# Patient Record
Sex: FEMALE | Race: WHITE | NOT HISPANIC OR LATINO | Employment: OTHER | URBAN - METROPOLITAN AREA
[De-identification: names, ages, dates, MRNs, and addresses within clinical notes are randomized per-mention and may not be internally consistent; named-entity substitution may affect disease eponyms.]

---

## 2017-05-17 ENCOUNTER — TELEPHONE (OUTPATIENT)
Dept: FAMILY MEDICINE | Facility: CLINIC | Age: 82
End: 2017-05-17

## 2017-05-17 NOTE — TELEPHONE ENCOUNTER
Returned call informed patient that we have not received records yet from office. Informed her of fax number and advised her to call the doctors office to request that her records be sent here. Patient verbalized understanding.

## 2017-05-17 NOTE — TELEPHONE ENCOUNTER
----- Message from Alejandra Alfonso sent at 5/17/2017  9:46 AM CDT -----  Contact: Self  Pt called to verify if medical records were received from 's office. Pt can be reached @ [phone].

## 2017-06-02 ENCOUNTER — LAB VISIT (OUTPATIENT)
Dept: LAB | Facility: HOSPITAL | Age: 82
End: 2017-06-02
Attending: FAMILY MEDICINE
Payer: MEDICARE

## 2017-06-02 ENCOUNTER — OFFICE VISIT (OUTPATIENT)
Dept: FAMILY MEDICINE | Facility: CLINIC | Age: 82
End: 2017-06-02
Payer: MEDICARE

## 2017-06-02 VITALS
DIASTOLIC BLOOD PRESSURE: 58 MMHG | TEMPERATURE: 97 F | WEIGHT: 149.25 LBS | HEART RATE: 79 BPM | OXYGEN SATURATION: 98 % | HEIGHT: 64 IN | BODY MASS INDEX: 25.48 KG/M2 | SYSTOLIC BLOOD PRESSURE: 130 MMHG

## 2017-06-02 DIAGNOSIS — Z00.00 ANNUAL PHYSICAL EXAM: Primary | ICD-10-CM

## 2017-06-02 DIAGNOSIS — E11.51 TYPE 2 DIABETES MELLITUS WITH DIABETIC PERIPHERAL ANGIOPATHY WITHOUT GANGRENE, WITHOUT LONG-TERM CURRENT USE OF INSULIN: ICD-10-CM

## 2017-06-02 DIAGNOSIS — M26.623 BILATERAL TEMPOROMANDIBULAR JOINT PAIN: ICD-10-CM

## 2017-06-02 DIAGNOSIS — K59.04 CHRONIC IDIOPATHIC CONSTIPATION: ICD-10-CM

## 2017-06-02 DIAGNOSIS — I73.9 PAD (PERIPHERAL ARTERY DISEASE): ICD-10-CM

## 2017-06-02 DIAGNOSIS — J30.1 NON-SEASONAL ALLERGIC RHINITIS DUE TO POLLEN: ICD-10-CM

## 2017-06-02 DIAGNOSIS — I25.2 OLD MI (MYOCARDIAL INFARCTION): ICD-10-CM

## 2017-06-02 DIAGNOSIS — E78.00 PURE HYPERCHOLESTEROLEMIA: ICD-10-CM

## 2017-06-02 DIAGNOSIS — I20.89 STABLE ANGINA: ICD-10-CM

## 2017-06-02 PROBLEM — M26.629 TMJ ARTHRALGIA: Status: ACTIVE | Noted: 2017-06-02

## 2017-06-02 PROBLEM — E11.9 TYPE 2 DIABETES MELLITUS, WITHOUT LONG-TERM CURRENT USE OF INSULIN: Status: ACTIVE | Noted: 2017-06-02

## 2017-06-02 LAB
ALBUMIN SERPL BCP-MCNC: 4 G/DL
ALP SERPL-CCNC: 84 U/L
ALT SERPL W/O P-5'-P-CCNC: 18 U/L
ANION GAP SERPL CALC-SCNC: 11 MMOL/L
AST SERPL-CCNC: 19 U/L
BILIRUB SERPL-MCNC: 0.7 MG/DL
BUN SERPL-MCNC: 21 MG/DL
CALCIUM SERPL-MCNC: 10.6 MG/DL
CHLORIDE SERPL-SCNC: 108 MMOL/L
CHOLEST/HDLC SERPL: 2.4 {RATIO}
CO2 SERPL-SCNC: 22 MMOL/L
CREAT SERPL-MCNC: 1.1 MG/DL
EST. GFR  (AFRICAN AMERICAN): 52 ML/MIN/1.73 M^2
EST. GFR  (NON AFRICAN AMERICAN): 45 ML/MIN/1.73 M^2
GLUCOSE SERPL-MCNC: 190 MG/DL
HDL/CHOLESTEROL RATIO: 42.4 %
HDLC SERPL-MCNC: 39 MG/DL
HDLC SERPL-MCNC: 92 MG/DL
LDLC SERPL CALC-MCNC: 20.2 MG/DL
NONHDLC SERPL-MCNC: 53 MG/DL
POTASSIUM SERPL-SCNC: 4.8 MMOL/L
PROT SERPL-MCNC: 7.1 G/DL
SODIUM SERPL-SCNC: 141 MMOL/L
TRIGL SERPL-MCNC: 164 MG/DL

## 2017-06-02 PROCEDURE — 80053 COMPREHEN METABOLIC PANEL: CPT

## 2017-06-02 PROCEDURE — 80061 LIPID PANEL: CPT

## 2017-06-02 PROCEDURE — 99387 INIT PM E/M NEW PAT 65+ YRS: CPT | Mod: S$GLB,,, | Performed by: FAMILY MEDICINE

## 2017-06-02 PROCEDURE — 99999 PR PBB SHADOW E&M-EST. PATIENT-LVL IV: CPT | Mod: PBBFAC,,, | Performed by: FAMILY MEDICINE

## 2017-06-02 PROCEDURE — 36415 COLL VENOUS BLD VENIPUNCTURE: CPT

## 2017-06-02 PROCEDURE — 83036 HEMOGLOBIN GLYCOSYLATED A1C: CPT

## 2017-06-02 RX ORDER — METOPROLOL TARTRATE 25 MG/1
25 TABLET, FILM COATED ORAL DAILY
COMMUNITY
End: 2017-06-12 | Stop reason: SDUPTHER

## 2017-06-02 RX ORDER — ENALAPRIL MALEATE 20 MG/1
20 TABLET ORAL DAILY
COMMUNITY
End: 2017-06-12 | Stop reason: SDUPTHER

## 2017-06-02 RX ORDER — LOVASTATIN 20 MG/1
20 TABLET ORAL NIGHTLY
COMMUNITY
End: 2017-06-12 | Stop reason: ALTCHOICE

## 2017-06-02 RX ORDER — METFORMIN HYDROCHLORIDE 500 MG/1
500 TABLET ORAL 2 TIMES DAILY WITH MEALS
COMMUNITY
End: 2017-06-12 | Stop reason: SDUPTHER

## 2017-06-02 RX ORDER — AMOXICILLIN 250 MG
1 CAPSULE ORAL DAILY
COMMUNITY

## 2017-06-02 RX ORDER — ISOSORBIDE MONONITRATE 60 MG/1
60 TABLET, EXTENDED RELEASE ORAL DAILY
COMMUNITY
End: 2017-06-12 | Stop reason: SDUPTHER

## 2017-06-02 RX ORDER — CLOPIDOGREL BISULFATE 75 MG/1
75 TABLET ORAL DAILY
COMMUNITY
End: 2017-06-12 | Stop reason: SDUPTHER

## 2017-06-02 RX ORDER — FLUTICASONE PROPIONATE 50 MCG
2 SPRAY, SUSPENSION (ML) NASAL DAILY
Qty: 1 BOTTLE | Refills: 1 | Status: SHIPPED | OUTPATIENT
Start: 2017-06-02 | End: 2018-03-08

## 2017-06-02 RX ORDER — ASPIRIN 81 MG/1
81 TABLET ORAL DAILY
COMMUNITY

## 2017-06-02 RX ORDER — NITROGLYCERIN 0.3 MG/1
0.3 TABLET SUBLINGUAL EVERY 5 MIN PRN
COMMUNITY
End: 2017-06-12 | Stop reason: SDUPTHER

## 2017-06-02 RX ORDER — ATORVASTATIN CALCIUM 40 MG/1
40 TABLET, FILM COATED ORAL DAILY
COMMUNITY
End: 2017-06-12 | Stop reason: SDUPTHER

## 2017-06-02 NOTE — PROGRESS NOTES
Chief Complaint   Patient presents with    Establish Care     SUBJECTIVE:   88 y.o. female for annual routine  Checkup and to establish care because pcp is retiring.  We have updated her charts.  She has diabetes that is controlled, but lately into the 200's and going on for about 1 month.  Increase stressors at home, h/o MI 8/16, stents at [facility], dr. Byers cardiology.  Current Outpatient Prescriptions   Medication Sig Dispense Refill    ascorbic acid, vitamin C, (VITAMIN C) 100 MG tablet Take 500 mg by mouth once daily.      aspirin (ECOTRIN) 81 MG EC tablet Take 81 mg by mouth once daily.      atorvastatin (LIPITOR) 40 MG tablet Take 40 mg by mouth once daily.      clopidogrel (PLAVIX) 75 mg tablet Take 75 mg by mouth once daily.      enalapril (VASOTEC) 20 MG tablet Take 20 mg by mouth once daily.      isosorbide mononitrate (IMDUR) 60 MG 24 hr tablet Take 60 mg by mouth once daily.      lovastatin (MEVACOR) 20 MG tablet Take 20 mg by mouth every evening.      metformin (GLUCOPHAGE) 500 MG tablet Take 500 mg by mouth 2 (two) times daily with meals.      metoprolol tartrate (LOPRESSOR) 25 MG tablet Take 25 mg by mouth Daily.      nitroGLYCERIN (NITROSTAT) 0.3 MG SL tablet Place 0.3 mg under the tongue every 5 (five) minutes as needed for Chest pain.      senna-docusate 8.6-50 mg (PERICOLACE) 8.6-50 mg per tablet Take 1 tablet by mouth once daily.       No current facility-administered medications for this visit.      Allergies: Amoxicillin and Bactrim [sulfamethoxazole-trimethoprim]   No LMP recorded. Patient is postmenopausal.    ROS:  Feeling well. No dyspnea or chest pain on exertion.  No abdominal pain, change in bowel habits, black or bloody stools.  No urinary tract symptoms. GYN ROS: no breast pain or new or enlarging lumps on self exam, no vaginal bleeding. No neurological complaints.    OBJECTIVE:   The patient appears well, alert, oriented x 3, in no distress.  BP (!) 130/58   Pulse 79    "Temp 96.7 °F (35.9 °C) (Oral)   Ht 5' 4" (1.626 m)   Wt 67.7 kg (149 lb 4 oz)   SpO2 98%   BMI 25.62 kg/m²   ENT normal.  Neck supple. No adenopathy or thyromegaly. TERE. Lungs are clear, good air entry, no wheezes, rhonchi or rales. S1 and S2 normal, no murmurs, regular rate and rhythm. Abdomen soft without tenderness, guarding, mass or organomegaly. Extremities show no edema, normal peripheral pulses. Neurological is normal, no focal findings.    Independent with ADL's  BREAST EXAM: deferred    PELVIC EXAM: deferred    ASSESSMENT:   1. Old MI (myocardial infarction)    2. PAD (peripheral artery disease)    3. Pure hypercholesterolemia    4. Stable angina    5. Type 2 diabetes mellitus with diabetic peripheral angiopathy without gangrene, without long-term current use of insulin    6. Bilateral temporomandibular joint pain    7. Chronic idiopathic constipation          PLAN:   Tonya was seen today for establish care.    Diagnoses and all orders for this visit:    Old MI (myocardial infarction)    PAD (peripheral artery disease)    Pure hypercholesterolemia    Stable angina    Type 2 diabetes mellitus with diabetic peripheral angiopathy without gangrene, without long-term current use of insulin    Bilateral temporomandibular joint pain    Chronic idiopathic constipation          "

## 2017-06-03 LAB
ESTIMATED AVG GLUCOSE: 192 MG/DL
HBA1C MFR BLD HPLC: 8.3 %

## 2017-06-05 ENCOUNTER — TELEPHONE (OUTPATIENT)
Dept: FAMILY MEDICINE | Facility: CLINIC | Age: 82
End: 2017-06-05

## 2017-06-05 NOTE — PROGRESS NOTES
Tradjenta is fine, better than the 9.5 she had prior, we can get her in to discuss as well with you, she is so nice, would be good to meet you.

## 2017-06-05 NOTE — TELEPHONE ENCOUNTER
----- Message from Bunny Kuo MD sent at 6/5/2017  2:30 PM CDT -----  Tradjenta is fine, better than the 9.5 she had prior, we can get her in to discuss as well with you, she is so nice, would be good to meet you.

## 2017-06-12 ENCOUNTER — OFFICE VISIT (OUTPATIENT)
Dept: FAMILY MEDICINE | Facility: CLINIC | Age: 82
End: 2017-06-12
Payer: MEDICARE

## 2017-06-12 VITALS
TEMPERATURE: 98 F | BODY MASS INDEX: 25.6 KG/M2 | WEIGHT: 149.94 LBS | DIASTOLIC BLOOD PRESSURE: 70 MMHG | SYSTOLIC BLOOD PRESSURE: 126 MMHG | OXYGEN SATURATION: 97 % | HEIGHT: 64 IN | HEART RATE: 85 BPM

## 2017-06-12 DIAGNOSIS — I20.89 STABLE ANGINA: ICD-10-CM

## 2017-06-12 DIAGNOSIS — N18.30 CHRONIC KIDNEY DISEASE (CKD), STAGE III (MODERATE): ICD-10-CM

## 2017-06-12 DIAGNOSIS — E11.51 TYPE 2 DIABETES MELLITUS WITH DIABETIC PERIPHERAL ANGIOPATHY WITHOUT GANGRENE, WITHOUT LONG-TERM CURRENT USE OF INSULIN: Primary | ICD-10-CM

## 2017-06-12 PROCEDURE — 99999 PR PBB SHADOW E&M-EST. PATIENT-LVL III: CPT | Mod: PBBFAC,,, | Performed by: PHYSICIAN ASSISTANT

## 2017-06-12 PROCEDURE — 99214 OFFICE O/P EST MOD 30 MIN: CPT | Mod: S$GLB,,, | Performed by: PHYSICIAN ASSISTANT

## 2017-06-12 PROCEDURE — 1159F MED LIST DOCD IN RCRD: CPT | Mod: S$GLB,,, | Performed by: PHYSICIAN ASSISTANT

## 2017-06-12 RX ORDER — ENALAPRIL MALEATE 20 MG/1
20 TABLET ORAL DAILY
Qty: 90 TABLET | Refills: 3 | Status: SHIPPED | OUTPATIENT
Start: 2017-06-12 | End: 2018-07-06 | Stop reason: SDUPTHER

## 2017-06-12 RX ORDER — CLOPIDOGREL BISULFATE 75 MG/1
75 TABLET ORAL DAILY
Qty: 90 TABLET | Refills: 3 | Status: SHIPPED | OUTPATIENT
Start: 2017-06-12 | End: 2018-06-04 | Stop reason: SDUPTHER

## 2017-06-12 RX ORDER — ATORVASTATIN CALCIUM 40 MG/1
40 TABLET, FILM COATED ORAL DAILY
Qty: 90 TABLET | Refills: 3 | Status: SHIPPED | OUTPATIENT
Start: 2017-06-12 | End: 2018-07-06 | Stop reason: SDUPTHER

## 2017-06-12 RX ORDER — NITROGLYCERIN 0.3 MG/1
0.3 TABLET SUBLINGUAL EVERY 5 MIN PRN
Qty: 25 TABLET | Refills: 1 | Status: SHIPPED | OUTPATIENT
Start: 2017-06-12

## 2017-06-12 RX ORDER — METOPROLOL TARTRATE 25 MG/1
25 TABLET, FILM COATED ORAL DAILY
Qty: 90 TABLET | Refills: 3 | Status: SHIPPED | OUTPATIENT
Start: 2017-06-12 | End: 2018-09-06 | Stop reason: SDUPTHER

## 2017-06-12 RX ORDER — ISOSORBIDE MONONITRATE 60 MG/1
60 TABLET, EXTENDED RELEASE ORAL DAILY
Qty: 90 TABLET | Refills: 3 | Status: SHIPPED | OUTPATIENT
Start: 2017-06-12 | End: 2018-06-04 | Stop reason: SDUPTHER

## 2017-06-12 RX ORDER — METFORMIN HYDROCHLORIDE 500 MG/1
500 TABLET ORAL 2 TIMES DAILY WITH MEALS
Qty: 90 TABLET | Refills: 3 | Status: SHIPPED | OUTPATIENT
Start: 2017-06-12 | End: 2018-01-24 | Stop reason: SDUPTHER

## 2017-06-12 NOTE — PROGRESS NOTES
"Subjective:       Tonya Marquez is an 88 y.o. female who presents for follow up of diabetes. Current symptoms include: hyperglycemia. Patient denies hypoglycemia , paresthesia of the feet, polydipsia and polyuria. Evaluation to date has included: fasting blood sugar, fasting lipid panel, hemoglobin A1C and microalbuminuria. Home sugars: has not been checking due to low strips. Current treatments: Metformin 1000 am and 500 pm . Last dilated eye exam recent.    Review of Systems  Pertinent items are noted in HPI.      Objective:      /70   Pulse 85   Temp 97.7 °F (36.5 °C) (Oral)   Ht 5' 4" (1.626 m)   Wt 68 kg (149 lb 14.6 oz)   SpO2 97%   BMI 25.73 kg/m²     General Appearance:    Alert, cooperative, no distress, appears stated age   Head:    Normocephalic, without obvious abnormality, atraumatic   Eyes:    PERRL, conjunctiva/corneas clear           Throat:   Lips, mucosa, and tongue normal; teeth and gums normal           Lungs:     Clear to auscultation bilaterally, respirations unlabored       Heart:    Regular rate and rhythm, S1 and S2 normal, no murmur, rub   or gallop               Extremities:   Extremities normal, atraumatic, no cyanosis or edema   Pulses:   2+ and symmetric all extremities   Skin:   Skin color, texture, turgor normal, no rashes or lesions     Protective Sensation (w/ 10 gram monofilament):  Right: Intact  Left: Intact    Visual Inspection:  Normal -  Bilateral    Pedal Pulses:   Right: Present  Left: Present    Posterior tibialis:   Right:Diminshed  Left: Present                Laboratory:  Lab Results   Component Value Date    HGBA1C 8.3 (H) 06/02/2017     Lab Results   Component Value Date    LDLCALC 20.2 (L) 06/02/2017    CREATININE 1.1 06/02/2017       Assessment:     1. Type 2 diabetes mellitus with diabetic peripheral angiopathy without gangrene, without long-term current use of insulin  linagliptin (TRADJENTA) 5 mg Tab tablet    metoprolol tartrate (LOPRESSOR) 25 MG " tablet    metformin (GLUCOPHAGE) 500 MG tablet    enalapril (VASOTEC) 20 MG tablet    blood sugar diagnostic Strp   2. Chronic kidney disease (CKD), stage III (moderate)     3. Stable angina  atorvastatin (LIPITOR) 40 MG tablet    nitroGLYCERIN (NITROSTAT) 0.3 MG SL tablet    isosorbide mononitrate (IMDUR) 60 MG 24 hr tablet    clopidogrel (PLAVIX) 75 mg tablet             Plan:   Type 2 diabetes mellitus with diabetic peripheral angiopathy without gangrene, without long-term current use of insulin  -     linagliptin (TRADJENTA) 5 mg Tab tablet; Take 1 tablet (5 mg total) by mouth once daily.  Dispense: 90 tablet; Refill: 3   ADDING THIS BACK IN.   -     metoprolol tartrate (LOPRESSOR) 25 MG tablet; Take 1 tablet (25 mg total) by mouth Daily.  Dispense: 90 tablet; Refill: 3  -     metformin (GLUCOPHAGE) 500 MG tablet; Take 1 tablet (500 mg total) by mouth 2 (two) times daily with meals. 2 am and 1 pm  Dispense: 90 tablet; Refill: 3  -     enalapril (VASOTEC) 20 MG tablet; Take 1 tablet (20 mg total) by mouth once daily.  Dispense: 90 tablet; Refill: 3  -     blood sugar diagnostic Strp; Check BID  Dispense: 150 each; Refill: 3    Chronic kidney disease (CKD), stage III (moderate)  Repeat in 3 months.     Stable angina  -     atorvastatin (LIPITOR) 40 MG tablet; Take 1 tablet (40 mg total) by mouth once daily.  Dispense: 90 tablet; Refill: 3  -     nitroGLYCERIN (NITROSTAT) 0.3 MG SL tablet; Place 1 tablet (0.3 mg total) under the tongue every 5 (five) minutes as needed for Chest pain.  Dispense: 25 tablet; Refill: 1  -     isosorbide mononitrate (IMDUR) 60 MG 24 hr tablet; Take 1 tablet (60 mg total) by mouth once daily.  Dispense: 90 tablet; Refill: 3  -     clopidogrel (PLAVIX) 75 mg tablet; Take 1 tablet (75 mg total) by mouth once daily.  Dispense: 90 tablet; Refill: 3  Follow up with Dr. Byers.     Requesting eye exam and DEXA.

## 2017-06-19 ENCOUNTER — TELEPHONE (OUTPATIENT)
Dept: FAMILY MEDICINE | Facility: CLINIC | Age: 82
End: 2017-06-19

## 2017-06-19 NOTE — TELEPHONE ENCOUNTER
----- Message from Aminata Garcia sent at 6/19/2017 11:57 AM CDT -----  Patient states [facility] contacted her and they have not yet received her refill prescriptions. They need this done today. Patient can be reached at [phone]. Thank you!

## 2017-06-19 NOTE — TELEPHONE ENCOUNTER
[facility] contacted and what they need was verification. Pharmacy was given the correct dosage per Michael Garsia.

## 2017-06-20 ENCOUNTER — TELEPHONE (OUTPATIENT)
Dept: FAMILY MEDICINE | Facility: CLINIC | Age: 82
End: 2017-06-20

## 2017-06-20 NOTE — TELEPHONE ENCOUNTER
----- Message from Cordelia Lowry sent at 6/20/2017 12:38 PM CDT -----  Contact: Pia li/ [facility] [phone]  Requesting last lab results to be faxed over. Pls fax to [phone]   Attn : Pia. Thanks.....Karissa

## 2017-07-20 NOTE — TELEPHONE ENCOUNTER
Patient request refills on the Lovastatin 20mg. Dr mcneil medication is not in patient profile and she is taking (ATORVASTATIN 40MG) please advice. Thanks

## 2017-07-20 NOTE — TELEPHONE ENCOUNTER
----- Message from Cordelia Lowry sent at 7/20/2017  1:57 PM CDT -----  Contact: SELF   [phone]  Pt is requesting a refill on Lovastatin 200 mg. Pls call  [facility] [phone]. Thanks............Karissa

## 2017-07-20 NOTE — TELEPHONE ENCOUNTER
Pt instructed to stop lovastatin and continue atorvastatin only, pt currently taking both. Pt verbalize understanding and states she will put away the lovastatin.

## 2017-10-23 ENCOUNTER — OFFICE VISIT (OUTPATIENT)
Dept: FAMILY MEDICINE | Facility: CLINIC | Age: 82
End: 2017-10-23
Payer: MEDICARE

## 2017-10-23 VITALS
WEIGHT: 147.69 LBS | HEART RATE: 88 BPM | SYSTOLIC BLOOD PRESSURE: 136 MMHG | TEMPERATURE: 96 F | DIASTOLIC BLOOD PRESSURE: 62 MMHG | RESPIRATION RATE: 12 BRPM | BODY MASS INDEX: 25.21 KG/M2 | HEIGHT: 64 IN | OXYGEN SATURATION: 98 %

## 2017-10-23 DIAGNOSIS — E11.59 TYPE 2 DIABETES MELLITUS WITH OTHER CIRCULATORY COMPLICATION, WITHOUT LONG-TERM CURRENT USE OF INSULIN: Primary | ICD-10-CM

## 2017-10-23 DIAGNOSIS — I73.9 PAD (PERIPHERAL ARTERY DISEASE): ICD-10-CM

## 2017-10-23 DIAGNOSIS — I25.2 OLD MI (MYOCARDIAL INFARCTION): ICD-10-CM

## 2017-10-23 DIAGNOSIS — I83.813 VARICOSE VEINS OF BILATERAL LOWER EXTREMITIES WITH PAIN: ICD-10-CM

## 2017-10-23 DIAGNOSIS — M26.623 BILATERAL TEMPOROMANDIBULAR JOINT PAIN: ICD-10-CM

## 2017-10-23 DIAGNOSIS — K59.04 CHRONIC IDIOPATHIC CONSTIPATION: ICD-10-CM

## 2017-10-23 DIAGNOSIS — N18.30 CHRONIC KIDNEY DISEASE (CKD), STAGE III (MODERATE): ICD-10-CM

## 2017-10-23 PROCEDURE — 99214 OFFICE O/P EST MOD 30 MIN: CPT | Mod: S$GLB,,, | Performed by: FAMILY MEDICINE

## 2017-10-23 PROCEDURE — 99999 PR PBB SHADOW E&M-EST. PATIENT-LVL V: CPT | Mod: PBBFAC,,, | Performed by: FAMILY MEDICINE

## 2017-10-23 RX ORDER — DIAZEPAM 2 MG/1
2 TABLET ORAL NIGHTLY PRN
Qty: 30 TABLET | Refills: 0 | Status: SHIPPED | OUTPATIENT
Start: 2017-10-23 | End: 2017-12-04

## 2017-10-23 NOTE — PROGRESS NOTES
"Chief Complaint   Patient presents with    Diabetes       SUBJECTIVE:  Tonya Marquez is a 88 y.o. female here for follow up of diabetes.  She is well controlled, having trouble with her leg veins and her TMJ and would like to get surgical opinion before going back home to [place] next year,  She is ok with her medication and no side effects.  Currently has co-morbidities including per problem list.      Social History   Substance Use Topics    Smoking status: Never Smoker    Smokeless tobacco: Never Used    Alcohol use Not on file       Patient Active Problem List    Diagnosis Date Noted    Chronic kidney disease (CKD), stage III (moderate) 06/12/2017    Old MI (myocardial infarction) 06/02/2017 8/2016 stents, [facility]  Sleeps on both sides and 2 pillows      PAD (peripheral artery disease) 06/02/2017     Also noted, seeing cardiology      Pure hypercholesterolemia 06/02/2017    Stable angina 06/02/2017     Rarely uses NTG      Type 2 diabetes mellitus, without long-term current use of insulin 06/02/2017     Controlled in past  Now increasing.      TMJ arthralgia 06/02/2017    Chronic idiopathic constipation 06/02/2017       Review of Systems   Constitutional: Negative.    HENT: Negative.    Eyes: Negative.    Respiratory: Negative.    Cardiovascular: Positive for chest pain and claudication. Negative for palpitations, orthopnea, leg swelling and PND.   Gastrointestinal: Negative.    Genitourinary: Negative.    Musculoskeletal: Positive for joint pain.   Skin: Negative.    Neurological: Negative.    Endo/Heme/Allergies: Negative.    Psychiatric/Behavioral: Negative.        OBJECTIVE:  /62   Pulse 88   Temp 96.2 °F (35.7 °C) (Oral)   Resp 12   Ht 5' 4" (1.626 m)   Wt 67 kg (147 lb 11.3 oz)   SpO2 98%   BMI 25.35 kg/m²     Wt Readings from Last 3 Encounters:   10/23/17 67 kg (147 lb 11.3 oz)   06/12/17 68 kg (149 lb 14.6 oz)   06/02/17 67.7 kg (149 lb 4 oz)     BP Readings from Last 3 " Encounters:   10/23/17 136/62   06/12/17 126/70   06/02/17 (!) 130/58       She appears well, in no apparent distress.  Alert and oriented times three, pleasant and cooperative. Vital signs are as documented in vital signs section.  TMJ arthrosis noted bilaterally with pain  S1 and S2 normal, no murmurs, clicks, gallops or rubs. Regular rate and rhythm. Chest is clear; no wheezes or rales. No edema or JVD.  Bilateral legs with varicose veins    Review of old Records:  Reviewed per [facility]    Review of old labs:    No results found for: TSH  Lab Results   Component Value Date    WBC 6.6 09/24/2010    HGB 12.2 09/24/2010    HCT 35.0 (L) 09/24/2010    MCV 88.3 09/24/2010     09/24/2010       Chemistry        Component Value Date/Time     06/02/2017 1001    K 4.8 06/02/2017 1001     06/02/2017 1001    CO2 22 (L) 06/02/2017 1001    BUN 21 06/02/2017 1001    CREATININE 1.1 06/02/2017 1001     (H) 06/02/2017 1001        Component Value Date/Time    CALCIUM 10.6 (H) 06/02/2017 1001    ALKPHOS 84 06/02/2017 1001    AST 19 06/02/2017 1001    ALT 18 06/02/2017 1001    BILITOT 0.7 06/02/2017 1001    ESTGFRAFRICA 52 (A) 06/02/2017 1001    EGFRNONAA 45 (A) 06/02/2017 1001        Lab Results   Component Value Date    CHOL 92 (L) 06/02/2017     Lab Results   Component Value Date    HDL 39 (L) 06/02/2017     Lab Results   Component Value Date    LDLCALC 20.2 (L) 06/02/2017     Lab Results   Component Value Date    TRIG 164 (H) 06/02/2017     Lab Results   Component Value Date    CHOLHDL 42.4 06/02/2017         Review of old imaging:  n/a      ASSESSMENT:  Problem List Items Addressed This Visit     Type 2 diabetes mellitus, without long-term current use of insulin - Primary    Relevant Orders    Hemoglobin A1c    Basic metabolic panel    TMJ arthralgia    Relevant Medications    diazePAM (VALIUM) 2 MG tablet    Other Relevant Orders    Ambulatory referral to ENT    PAD (peripheral artery disease)    Relevant  Orders    Ambulatory referral to Vascular Surgery    Old MI (myocardial infarction)    Chronic kidney disease (CKD), stage III (moderate)    Chronic idiopathic constipation      Other Visit Diagnoses     Varicose veins of bilateral lower extremities with pain        Relevant Orders    Ambulatory referral to Vascular Surgery          ICD-10-CM ICD-9-CM   1. Type 2 diabetes mellitus with other circulatory complication, without long-term current use of insulin E11.59 250.70   2. Chronic idiopathic constipation K59.04 564.00   3. Chronic kidney disease (CKD), stage III (moderate) N18.3 585.3   4. Old MI (myocardial infarction) I25.2 412   5. PAD (peripheral artery disease) I73.9 443.9   6. Bilateral temporomandibular joint pain M26.623 524.62   7. Varicose veins of bilateral lower extremities with pain I83.813 454.8               PLAN:     get the new labs    Continue constipation therapy    tradjenta for CKD and check new bmp    Seeing [place] for the angina this week.    PAD and varicose veins will get her to vascular.    ENT with the TMJ    Medication List with Changes/Refills   New Medications    DIAZEPAM (VALIUM) 2 MG TABLET    Take 1 tablet (2 mg total) by mouth nightly as needed for Anxiety.   Current Medications    ASCORBIC ACID, VITAMIN C, (VITAMIN C) 100 MG TABLET    Take 500 mg by mouth once daily.    ASPIRIN (ECOTRIN) 81 MG EC TABLET    Take 81 mg by mouth once daily.    ATORVASTATIN (LIPITOR) 40 MG TABLET    Take 1 tablet (40 mg total) by mouth once daily.    BLOOD SUGAR DIAGNOSTIC STRP    Check BID    CLOPIDOGREL (PLAVIX) 75 MG TABLET    Take 1 tablet (75 mg total) by mouth once daily.    ENALAPRIL (VASOTEC) 20 MG TABLET    Take 1 tablet (20 mg total) by mouth once daily.    FLUTICASONE (FLONASE) 50 MCG/ACTUATION NASAL SPRAY    2 sprays by Each Nare route once daily.    ISOSORBIDE MONONITRATE (IMDUR) 60 MG 24 HR TABLET    Take 1 tablet (60 mg total) by mouth once daily.    LINAGLIPTIN (TRADJENTA) 5 MG TAB  TABLET    Take 1 tablet (5 mg total) by mouth once daily.    METFORMIN (GLUCOPHAGE) 500 MG TABLET    Take 1 tablet (500 mg total) by mouth 2 (two) times daily with meals. 2 am and 1 pm    METOPROLOL TARTRATE (LOPRESSOR) 25 MG TABLET    Take 1 tablet (25 mg total) by mouth Daily.    NITROGLYCERIN (NITROSTAT) 0.3 MG SL TABLET    Place 1 tablet (0.3 mg total) under the tongue every 5 (five) minutes as needed for Chest pain.    SENNA-DOCUSATE 8.6-50 MG (PERICOLACE) 8.6-50 MG PER TABLET    Take 1 tablet by mouth once daily.       Return in about 3 months (around 1/23/2018) for assess treatment plan, diabetes management.

## 2017-10-24 ENCOUNTER — LAB VISIT (OUTPATIENT)
Dept: LAB | Facility: HOSPITAL | Age: 82
End: 2017-10-24
Payer: MEDICARE

## 2017-10-24 DIAGNOSIS — E11.59 TYPE 2 DIABETES MELLITUS WITH OTHER CIRCULATORY COMPLICATION, WITHOUT LONG-TERM CURRENT USE OF INSULIN: ICD-10-CM

## 2017-10-24 LAB
ANION GAP SERPL CALC-SCNC: 7 MMOL/L
BUN SERPL-MCNC: 19 MG/DL
CALCIUM SERPL-MCNC: 10.5 MG/DL
CHLORIDE SERPL-SCNC: 108 MMOL/L
CO2 SERPL-SCNC: 24 MMOL/L
CREAT SERPL-MCNC: 1 MG/DL
EST. GFR  (AFRICAN AMERICAN): 58.1 ML/MIN/1.73 M^2
EST. GFR  (NON AFRICAN AMERICAN): 50.4 ML/MIN/1.73 M^2
ESTIMATED AVG GLUCOSE: 148 MG/DL
GLUCOSE SERPL-MCNC: 171 MG/DL
HBA1C MFR BLD HPLC: 6.8 %
POTASSIUM SERPL-SCNC: 4.6 MMOL/L
SODIUM SERPL-SCNC: 139 MMOL/L

## 2017-10-24 PROCEDURE — 80048 BASIC METABOLIC PNL TOTAL CA: CPT

## 2017-10-24 PROCEDURE — 83036 HEMOGLOBIN GLYCOSYLATED A1C: CPT

## 2017-10-24 PROCEDURE — 36415 COLL VENOUS BLD VENIPUNCTURE: CPT | Mod: PO

## 2017-10-30 DIAGNOSIS — I87.2 VENOUS INSUFFICIENCY OF BOTH LOWER EXTREMITIES: Primary | ICD-10-CM

## 2017-10-31 ENCOUNTER — TELEPHONE (OUTPATIENT)
Dept: VASCULAR SURGERY | Facility: CLINIC | Age: 82
End: 2017-10-31

## 2017-10-31 ENCOUNTER — TELEPHONE (OUTPATIENT)
Dept: FAMILY MEDICINE | Facility: CLINIC | Age: 82
End: 2017-10-31

## 2017-10-31 NOTE — TELEPHONE ENCOUNTER
Patient stated that she had studies from [facility] but she did not want to return to their clinic. Advised patient to either sign a release of information for our office to receive the studies or she could request to have the studies herself and she could bring them in for her appointment. Explained once she knew that her ultrasounds would be available to call and would make her appointment with the doctor. Patient stated understanding. Gave patient the appointment desk number.

## 2017-11-22 ENCOUNTER — TELEPHONE (OUTPATIENT)
Dept: VASCULAR SURGERY | Facility: CLINIC | Age: 82
End: 2017-11-22

## 2017-11-22 NOTE — TELEPHONE ENCOUNTER
Attempted to contact patient about appointment and ultrasound. No answer left voice mail. Explained that have never received the ultrasounds from [facility].Stated patient will need to do ultrasound here unless she wants to get the records herself from [facility]. Will attempt to call again on Friday.

## 2017-11-22 NOTE — TELEPHONE ENCOUNTER
Patient returned phone call. Explained that changed appt. For Dec 4th and a ultrasound for the 30th. She stated that she was going to go down to [facility] today and talk with them again as she did not want to have to do a ultrasound again. Explained to just request her own records and then she could just bring them to the appt. When she comes. She stated she would call once she knew what [facility] would tell her. Explained to just call and can always cancel or reschedule appointment if needed.

## 2017-12-01 ENCOUNTER — TELEPHONE (OUTPATIENT)
Dept: VASCULAR SURGERY | Facility: CLINIC | Age: 82
End: 2017-12-01

## 2017-12-01 NOTE — TELEPHONE ENCOUNTER
Left voicemail about patients appointment tomorrow. Left a call back number for any questions or if needed to change this appointment.

## 2017-12-04 ENCOUNTER — OFFICE VISIT (OUTPATIENT)
Dept: VASCULAR SURGERY | Facility: CLINIC | Age: 82
End: 2017-12-04
Payer: MEDICARE

## 2017-12-04 VITALS
WEIGHT: 147.5 LBS | HEIGHT: 62 IN | RESPIRATION RATE: 15 BRPM | HEART RATE: 52 BPM | SYSTOLIC BLOOD PRESSURE: 130 MMHG | BODY MASS INDEX: 27.14 KG/M2 | DIASTOLIC BLOOD PRESSURE: 54 MMHG

## 2017-12-04 DIAGNOSIS — I83.93 SPIDER VEINS OF BOTH LOWER EXTREMITIES: ICD-10-CM

## 2017-12-04 DIAGNOSIS — I83.93 VARICOSE VEINS OF BOTH LOWER EXTREMITIES: Primary | ICD-10-CM

## 2017-12-04 DIAGNOSIS — R60.0 BILATERAL LEG EDEMA: ICD-10-CM

## 2017-12-04 PROCEDURE — 99204 OFFICE O/P NEW MOD 45 MIN: CPT | Mod: S$GLB,,, | Performed by: SURGERY

## 2017-12-04 PROCEDURE — 99999 PR PBB SHADOW E&M-EST. PATIENT-LVL III: CPT | Mod: PBBFAC,,, | Performed by: SURGERY

## 2017-12-04 NOTE — LETTER
December 4, 2017      Bunyn Kuo MD  [address] [place] [address]           [facility]  [address] [facility] [address]  [place] [address]  Phone: [phone]  Fax: [phone]          Patient: Tonya Marquez   MR Number: [id number]   YOB: 1929   Date of Visit: 12/4/2017       Dear Dr. Bunny Kuo:    Thank you for referring Tonya Marquez to me for evaluation.  I have initiated a comprehensive treatment plan for her BLE varicose veins and pain.  Please continue to evaluate for other etiologies of her calf pain including electrolyte abnormalities vs restless leg syndrome etc.  Please let me know if there is anything else that I can do to help you.    Attached you will find relevant portions of my assessment and plan of care.    If you have questions, please do not hesitate to call me. I look forward to following Tonya Marquez along with you.    Sincerely,    Robert Love MD    Enclosure  CC:  No Recipients    If you would like to receive this communication electronically, please contact [email or web address] or [phone] to request more information on [employer] Link access.    For providers and/or their staff who would like to refer a patient to [facility], please contact us through our one-stop-shop provider referral line, [facility], at [phone].    If you feel you have received this communication in error or would no longer like to receive these types of communications, please e-mail [email or web address]

## 2017-12-04 NOTE — PATIENT INSTRUCTIONS
Putting on Compression Stockings     Turn the stocking inside-out, then fit it over your toes and heel.          Roll the stocking up your leg.            Once stockings are on, make sure the top of the stocking is about two fingers width below the crease of the knee (or the groin if you wear thigh-high stockings).          Use equipment, such as a stocking margo, or wear rubber gloves to make it easier to put on compression stockings.         Elastic compression stockings are prescribed to treat many vein problems. Wearing them may be the most important thing you do to manage your symptoms. The stockings fit tightly around your ankle, gradually reducing in pressure as they go up your legs. This helps keep blood flowing to your heart. As a result, swelling is reduced. Your healthcare provider will prescribe stockings at a safe pressure for you. He or she will also tell you how often to wear and remove the stockings. Follow these instructions closely. Also, do not buy or wear compression stockings without first seeing your healthcare provider.  Tips for wear and care  To wear stockings safely and to get the most benefit:  · Wear the length prescribed by your healthcare provider.  · Pull them to the designated height and no farther. Dont let them bunch at the top. This can restrict blood flow and increase swelling.  · Wear the stockings for the amount of time your healthcare provider recommends. Replace them when they start to feel loose. This will likely be every 3 to 6 months.  · Remove them as your healthcare provider directs. When removed, wash your legs. Then check your legs and feet for sores. Call your healthcare provider if you find a sore. Dont put the stockings back on unless your healthcare provider directs.   · Wash the stockings as instructed. They may need to be hand-washed.  Date Last Reviewed: 5/1/2016  © 8580-1981 [employer]. [address] [place] [address], [place] [address]. All rights  reserved. This information is not intended as a substitute for professional medical care. Always follow your healthcare professional's instructions.        Understanding Chronic Venous Insufficiency  Problems with the veins in the legs may lead to chronic venous insufficiency (CVI). CVI means that there is a long-term problem with the veins not being able to pump blood back to your heart. When this happens, blood stays in the legs and causes swelling and aching.   Two problems that may lead to chronic venous insufficiency are:  · Damaged valves. Valves keep blood flowing from the legs through the blood vessels and back to the heart. When the valves are damaged, blood does not flow as well.   · Deep vein thrombosis (DVT). Blood clots may form in the deep veins of the legs. This may cause pain, redness, and swelling in the legs. It may also block the flow of blood back to the heart. Seek immediate medical care if you have these symptoms.  · A blood clot in the leg can also break off and travel to the lungs. This is called pulmonary embolism (PE). In the lungs, the clot can cut off the flow of blood. This may cause chest pain, trouble breathing, sweating, a fast heartbeat, coughing (may cough up blood), and fainting. It is a medical emergency and may cause death. Call 911 if you have these symptoms.  · Healthcare providers call the two conditions, DVT and PE, venous thromboembolism (VTE).  CVI cant be cured, but you can control leg swelling to reduce the likelihood of ulcers (sores).  Recognizing the symptoms  Be aware of the following:  · If you stand or sit with your feet down for long periods, your legs may ache or feel heavy.  · Swollen ankles are possibly the most common symptom of CVI.  · As swelling increases, the skin over your ankles may show red spots or a brownish tinge. The skin may feel leathery or scaly, and may start to itch.  · If swelling is not controlled, an ulcer (open wound) may form.  What you  can do  Reduce your risk of developing ulcers by doing the following:  · Increase blood flow back to your heart by elevating your legs, exercising daily, and wearing elastic stockings.  · Boost blood flow in your legs by losing excess weight.  · If you must stand or sit in one place for a period of time, keep your blood moving by wiggling your toes, shifting your body position, and rising up on the balls of your feet.    Date Last Reviewed: 5/1/2016  © 6478-7145 [employer]. [address] [place] [address]. All rights reserved. This information is not intended as a substitute for professional medical care. Always follow your healthcare professional's instructions.        Understanding Veins   The ongoing flow of blood from the heart to the body and back to the heart again is called circulation. Blood vessels carry blood throughout your body. Veins are the vessels that return blood to the heart.      Anatomy of a vein  Veins have cuplike flaps called valves spaced along their inside walls. Valves open upward, so blood can move up the vein. Valves close to keep blood from falling back down the vein.     A bart muscle squeezes blood through the valve up the vein.        A relaxing muscle closes the valve, holding the blood in place.     Date Last Reviewed: 5/1/2016  © 1854-4881 [employer]. [address] [place] [address]. All rights reserved. This information is not intended as a substitute for professional medical care. Always follow your healthcare professional's instructions.

## 2017-12-04 NOTE — PROGRESS NOTES
Robert Love MD [facility]                         12/04/2017    HPI:  Tonya Marquez is a 88 y.o. female with   Patient Active Problem List   Diagnosis    Old MI (myocardial infarction)    PAD (peripheral artery disease)    Pure hypercholesterolemia    Stable angina    Type 2 diabetes mellitus, without long-term current use of insulin    TMJ arthralgia    Chronic idiopathic constipation    Chronic kidney disease (CKD), stage III (moderate)   s/p MI 2016 requiring coronary stenting at [facility] being managed by PCP and specialists who is here today for evaluation of BLE varicose veins and edema.  She is s/p L GSV stripping 20-30 yrs ago and L SSV sclerotherapy 2015.  Cont to have c/o BLE pain.  Describes BLE pain nightly.  Patient states location is bilateral calf occurring for 2 years.  Associated signs and symptoms are none.  Quality is cramping and severity is 5/10.  Symptoms began 2 yrs ago after L SSV sclerotherapy.  Alleviating factors include exercise and pain medication.  Worsening factors include dependency.  Denies BLE edema, inflammatory symptoms and wounds.    yes MI  no Stroke  Tobacco use: never smoker    Past Medical History:   Diagnosis Date    Coronary artery disease     Diabetes mellitus type I     Hyperlipidemia     Hypertension      Past Surgical History:   Procedure Laterality Date    ADENOIDECTOMY      CATARACT EXTRACTION, BILATERAL      CORONARY STENT PLACEMENT  08/21/2016    dr. ruiz     History reviewed. No pertinent family history.  Social History     Social History    Marital status:      Spouse name: N/A    Number of children: N/A    Years of education: N/A     Occupational History    Not on file.     Social History Main Topics    Smoking status: Never Smoker    Smokeless tobacco: Never Used    Alcohol use Not on file    Drug use: Unknown    Sexual activity: Not on file     Other Topics Concern     Not on file     Social History Narrative    No narrative on file       Current Outpatient Prescriptions:     aspirin (ECOTRIN) 81 MG EC tablet, Take 81 mg by mouth once daily., Disp: , Rfl:     atorvastatin (LIPITOR) 40 MG tablet, Take 1 tablet (40 mg total) by mouth once daily., Disp: 90 tablet, Rfl: 3    blood sugar diagnostic Strp, Check BID, Disp: 150 each, Rfl: 3    clopidogrel (PLAVIX) 75 mg tablet, Take 1 tablet (75 mg total) by mouth once daily., Disp: 90 tablet, Rfl: 3    enalapril (VASOTEC) 20 MG tablet, Take 1 tablet (20 mg total) by mouth once daily., Disp: 90 tablet, Rfl: 3    fluticasone (FLONASE) 50 mcg/actuation nasal spray, 2 sprays by Each Nare route once daily., Disp: 1 Bottle, Rfl: 1    isosorbide mononitrate (IMDUR) 60 MG 24 hr tablet, Take 1 tablet (60 mg total) by mouth once daily., Disp: 90 tablet, Rfl: 3    linagliptin (TRADJENTA) 5 mg Tab tablet, Take 1 tablet (5 mg total) by mouth once daily., Disp: 90 tablet, Rfl: 3    metformin (GLUCOPHAGE) 500 MG tablet, Take 1 tablet (500 mg total) by mouth 2 (two) times daily with meals. 2 am and 1 pm, Disp: 90 tablet, Rfl: 3    metoprolol tartrate (LOPRESSOR) 25 MG tablet, Take 1 tablet (25 mg total) by mouth Daily., Disp: 90 tablet, Rfl: 3    nitroGLYCERIN (NITROSTAT) 0.3 MG SL tablet, Place 1 tablet (0.3 mg total) under the tongue every 5 (five) minutes as needed for Chest pain., Disp: 25 tablet, Rfl: 1    ascorbic acid, vitamin C, (VITAMIN C) 100 MG tablet, Take 500 mg by mouth once daily., Disp: , Rfl:     senna-docusate 8.6-50 mg (PERICOLACE) 8.6-50 mg per tablet, Take 1 tablet by mouth once daily., Disp: , Rfl:     REVIEW OF SYSTEMS:  General: No fevers or chills; ENT: No sore throat; Allergy and Immunology: no persistent infections; Hematological and Lymphatic: No history of bleeding or easy bruising; Endocrine: negative; Respiratory: no cough, shortness of breath, or wheezing; Cardiovascular: no chest pain or dyspnea on  exertion; Gastrointestinal: no abdominal pain/back, change in bowel habits, or bloody stools; Genito-Urinary: no dysuria, trouble voiding, or hematuria; Musculoskeletal: negative; Neurological: no TIA or stroke symptoms; Psychiatric: no nervousness, anxiety or depression.    PHYSICAL EXAM:   Right Arm BP - Sittin/60 (17 0920)  Left Arm BP - Sittin/54 (17 0920)  Pulse: (!) 52         General appearance:  Alert, well-appearing, and in no distress.  Oriented to person, place, and time                    Neurological: Normal speech, no focal findings noted; CN II - XII grossly intact. RLE with sensation to light touch, LLE with sensation to light touch.            Musculoskeletal: Digits/nail without cyanosis/clubbing.  Strength 5/5 BLE.                    Neck: Supple, no significant adenopathy, no carotid bruit can be auscultated                  Chest:  Clear to auscultation, no wheezes, rales or rhonchi, symmetric air entry. No use of accessory muscles               Cardiac: Normal rate and regular rhythm, S1 and S2 normal            Abdomen: Soft, nontender, nondistended, no masses or organomegaly, no hernia     No rebound tenderness noted; bowel sounds normal     No groin adenopathy      Extremities:   2+ R femoral pulse, 2+ L femoral pulse     1+ R popliteal pulse, 1+ L popliteal pulse     1+ R PT pulse, 1+ L PT pulse     2+ R DP pulse, 2+ L DP pulse     1+ RLE edema, 1+ LLE edema    Skin: RLE without ulcer; LLE without ulcer     Bilateral LE varicose veins, no thrombophlebitis, bilateral LE reticular veins, minimal edema BLE     CEAP RLE 3, 3    LAB RESULTS:  No results found for: CBC  Lab Results   Component Value Date    LABPROT 10.3 2010    INR 1.0 2010     Lab Results   Component Value Date     10/24/2017    K 4.6 10/24/2017     10/24/2017    CO2 24 10/24/2017     (H) 10/24/2017    BUN 19 10/24/2017    CREATININE 1.0 10/24/2017    CALCIUM 10.5 10/24/2017     ANIONGAP 7 (L) 10/24/2017    EGFRNONAA 50.4 (A) 10/24/2017     Lab Results   Component Value Date    WBC 6.6 09/24/2010    RBC 3.96 (L) 09/24/2010    HGB 12.2 09/24/2010    HCT 35.0 (L) 09/24/2010    MCV 88.3 09/24/2010    MCH 30.8 09/24/2010    MCHC 34.9 09/24/2010    RDW 13.6 09/24/2010     09/24/2010    MPV 9.1 (L) 09/24/2010    GRAN 4.5 09/24/2010    GRAN 67.9 09/24/2010    LYMPH 23.9 09/24/2010    LYMPH 1.6 09/24/2010    MONO 6.7 09/24/2010    MONO 0.4 09/24/2010    EOS 0.1 09/24/2010    BASO 0.0 09/24/2010    EOSINOPHIL 1.2 09/24/2010    BASOPHIL 0.3 09/24/2010     .  Lab Results   Component Value Date    HGBA1C 6.8 (H) 10/24/2017       IMAGING:  All pertinent imaging has been reviewed and interpreted independently.    No outside imaging available.    CORA 1/1    IMP/PLAN:  88 y.o. female with   Patient Active Problem List   Diagnosis    Old MI (myocardial infarction)    PAD (peripheral artery disease)    Pure hypercholesterolemia    Stable angina    Type 2 diabetes mellitus, without long-term current use of insulin    TMJ arthralgia    Chronic idiopathic constipation    Chronic kidney disease (CKD), stage III (moderate)   s/p MI 2016 requiring coronary stenting at [facility] being managed by PCP and specialists who is here today for evaluation of BLE varicose veins and edema.    -BLE varicose veins associated with pain - recommend compression with Rx stockings (provided today), elevation, dietary changes associated with water and sodium intake discussed at length with patient  -Calf pain nightly is likely multifactorial - rec continued evaluation of etiologies by Dr. Kuo with electrolyte monitoring vs restless leg syndrome etc  -Will see pt back in 3 months for further evaluation of symptoms after conservative treatment and review of outside imaging (will obtain release from patient)      I spent 45 minutes evaluating this patient and greater than 50% of the time was spent counseling,  coordinator care and discussing the plan of care.  All questions were answered and patient stated understanding with agreement with the above treatment plan.    Robert Love MD R[facility]I  [facility]ascular [facility] Endovascular [facility]

## 2017-12-12 ENCOUNTER — OFFICE VISIT (OUTPATIENT)
Dept: FAMILY MEDICINE | Facility: CLINIC | Age: 82
End: 2017-12-12
Payer: MEDICARE

## 2017-12-12 VITALS
RESPIRATION RATE: 16 BRPM | HEART RATE: 82 BPM | HEIGHT: 62 IN | SYSTOLIC BLOOD PRESSURE: 120 MMHG | TEMPERATURE: 98 F | WEIGHT: 145.75 LBS | OXYGEN SATURATION: 98 % | DIASTOLIC BLOOD PRESSURE: 60 MMHG | BODY MASS INDEX: 26.82 KG/M2

## 2017-12-12 DIAGNOSIS — N18.30 TYPE 2 DIABETES MELLITUS WITH STAGE 3 CHRONIC KIDNEY DISEASE, WITHOUT LONG-TERM CURRENT USE OF INSULIN: ICD-10-CM

## 2017-12-12 DIAGNOSIS — E11.319 TYPE 2 DIABETES MELLITUS WITH RETINOPATHY, WITHOUT LONG-TERM CURRENT USE OF INSULIN, MACULAR EDEMA PRESENCE UNSPECIFIED, UNSPECIFIED LATERALITY, UNSPECIFIED RETINOPATHY SEVERITY: ICD-10-CM

## 2017-12-12 DIAGNOSIS — K59.04 CHRONIC IDIOPATHIC CONSTIPATION: ICD-10-CM

## 2017-12-12 DIAGNOSIS — I87.2 VENOUS INSUFFICIENCY OF BOTH LOWER EXTREMITIES: ICD-10-CM

## 2017-12-12 DIAGNOSIS — I25.2 OLD MI (MYOCARDIAL INFARCTION): ICD-10-CM

## 2017-12-12 DIAGNOSIS — E11.42 TYPE 2 DIABETES MELLITUS WITH POLYNEUROPATHY: ICD-10-CM

## 2017-12-12 DIAGNOSIS — I25.118 CORONARY ARTERY DISEASE OF NATIVE ARTERY OF NATIVE HEART WITH STABLE ANGINA PECTORIS: ICD-10-CM

## 2017-12-12 DIAGNOSIS — Z23 NEED FOR INFLUENZA VACCINATION: ICD-10-CM

## 2017-12-12 DIAGNOSIS — E11.22 TYPE 2 DIABETES MELLITUS WITH STAGE 3 CHRONIC KIDNEY DISEASE, WITHOUT LONG-TERM CURRENT USE OF INSULIN: ICD-10-CM

## 2017-12-12 DIAGNOSIS — Z00.00 ENCOUNTER FOR PREVENTIVE HEALTH EXAMINATION: Primary | ICD-10-CM

## 2017-12-12 DIAGNOSIS — M26.623 BILATERAL TEMPOROMANDIBULAR JOINT PAIN: ICD-10-CM

## 2017-12-12 DIAGNOSIS — E78.00 PURE HYPERCHOLESTEROLEMIA: ICD-10-CM

## 2017-12-12 DIAGNOSIS — Z78.0 POSTMENOPAUSAL: ICD-10-CM

## 2017-12-12 DIAGNOSIS — Z23 NEED FOR PNEUMOCOCCAL VACCINATION: ICD-10-CM

## 2017-12-12 PROCEDURE — 90670 PCV13 VACCINE IM: CPT | Mod: S$GLB,,, | Performed by: FAMILY MEDICINE

## 2017-12-12 PROCEDURE — G0008 ADMIN INFLUENZA VIRUS VAC: HCPCS | Mod: S$GLB,,, | Performed by: FAMILY MEDICINE

## 2017-12-12 PROCEDURE — 99999 PR PBB SHADOW E&M-EST. PATIENT-LVL V: CPT | Mod: PBBFAC,,, | Performed by: NURSE PRACTITIONER

## 2017-12-12 PROCEDURE — G0009 ADMIN PNEUMOCOCCAL VACCINE: HCPCS | Mod: S$GLB,,, | Performed by: FAMILY MEDICINE

## 2017-12-12 PROCEDURE — 90662 IIV NO PRSV INCREASED AG IM: CPT | Mod: S$GLB,,, | Performed by: FAMILY MEDICINE

## 2017-12-12 PROCEDURE — G0439 PPPS, SUBSEQ VISIT: HCPCS | Mod: S$GLB,,, | Performed by: NURSE PRACTITIONER

## 2017-12-12 NOTE — PROGRESS NOTES
"Tonya Marquez presented for a  Medicare AWV and comprehensive Health Risk Assessment today. The following components were reviewed and updated:    · Medical history  · Family History  · Social history  · Allergies and Current Medications  · Health Risk Assessment  · Health Maintenance  · Care Team     ** See Completed Assessments for Annual Wellness Visit within the encounter summary.**       The following assessments were completed:  · Living Situation  · CAGE  · Depression Screening  · Timed Get Up and Go  · Whisper Test  · Cognitive Function Screening  · Nutrition Screening  · ADL Screening  · PAQ Screening    Vitals:    12/12/17 1351   BP: 120/60   Pulse: 82   Resp: 16   Temp: 98.3 °F (36.8 °C)   TempSrc: Oral   SpO2: 98%   Weight: 66.1 kg (145 lb 11.6 oz)   Height: 5' 2" (1.575 m)     Body mass index is 26.65 kg/m².  Physical Exam   Constitutional: She is oriented to person, place, and time. She appears well-developed. No distress.   HENT:   Head: Normocephalic.   Right Ear: External ear normal.   Left Ear: External ear normal.   Nose: Nose normal.   Mouth/Throat: Oropharynx is clear and moist.   Cardiovascular: Normal rate and regular rhythm.    Murmur heard.  Pulses:       Dorsalis pedis pulses are 2+ on the right side, and 2+ on the left side.        Posterior tibial pulses are 0 on the right side, and 0 on the left side.   Pulmonary/Chest: Effort normal and breath sounds normal.   Feet:   Right Foot:   Protective Sensation: 10 sites tested. 2 sites sensed.   Skin Integrity: Negative for skin breakdown.   Left Foot:   Protective Sensation: 10 sites tested. 0 sites sensed.   Skin Integrity: Negative for skin breakdown.   Neurological: She is alert and oriented to person, place, and time.   Skin: She is not diaphoretic.   Psychiatric: She has a normal mood and affect. Her behavior is normal.             Diagnoses and health risks identified today and associated recommendations/orders:    1. Coronary artery " disease of native artery of native heart with stable angina pectoris  She is on imdur, s/p MI 2016 requiring stenting at [facility], follow by Dr. Byers  - Lipid panel; Future  - Ambulatory referral to Nutrition Services    2. Type 2 diabetes mellitus with stage 3 chronic kidney disease, without long-term current use of insulin  Controlled on metformin and tradjenta  - Ambulatory referral to Nutrition Services  - Comprehensive metabolic panel; Future  - Hemoglobin A1c; Future  - CBC auto differential; Future    3. Type 2 diabetes mellitus with retinopathy, without long-term current use of insulin, macular edema presence unspecified, unspecified laterality, unspecified retinopathy severity  Follow by Dr. Tomlinson and has had bilateral sx repair retinal detachment    4. Type 2 diabetes mellitus with polyneuropathy  Abnormal monofilament test today, pt unable to sense monofilament, she has no skin breakdown, she is follow by podiatry, Dr. Gallegos and has seen him this year    5. Bilateral temporomandibular joint pain  Has seen several ENT without improvement and desires oral maxillary sx evaluation  - Ambulatory consult to Oral Maxillofacial Surgery    6. Old MI (myocardial infarction)  Noted, s/p stent 2016    7. Pure hypercholesterolemia  On lipitor 40 mg with some cognitive decline and last LDL 20, advise to repeat test and consider reducing dosage if persistently low  - Lipid panel; Future    8. Venous insufficiency of both lower extremities  Follow by vascular sx, Dr. Love    9. Chronic idiopathic constipation  On pericolace prn    10. Postmenopausal  Due for bone density  - DXA Bone Density Spine And Hip; Future    11. Need for pneumococcal vaccination  Pt does not have record, recommended  - (In Office Administered) Pneumococcal Conjugate Vaccine (13 Valent) (IM)    12. Need for influenza vaccination  recommended  - Influenza - High Dose (65+) (PF) (IM)    13. Encounter for preventive health  examination  Provided Tonya with a 5-10 year written screening schedule and personal prevention plan. Recommendations were developed using the USPSTF age appropriate recommendations. Education, counseling, and referrals were provided as needed. After Visit Summary printed and given to patient which includes a list of additional screenings\tests needed.    Return every 3-6 months.    Aide John, MARIO

## 2017-12-12 NOTE — PATIENT INSTRUCTIONS
Counseling and Referral of Other Preventative  (Italic type indicates deductible and co-insurance are waived)    Patient Name: Tonya Marquez  Today's Date: 12/12/2017      SERVICE LIMITATIONS RECOMMENDATION    Vaccines    · Pneumococcal (once after 65)    · Influenza (annually)    · Hepatitis B (if medium/high risk)    · Prevnar 13      Hepatitis B medium/high risk factors:       - End-stage renal disease       - Hemophiliacs who received Factor VII or         IX concentrates       - Clients of institutions for the mentally             retarded       - Persons who live in the same house as          a HepB carrier       - Homosexual men       - Illicit injectable drug abusers     Pneumococcal: due 12/12/2018     Influenza: give today     Hepatitis B: N/A     Prevnar 13: given today    Mammogram (biennial age 50-74)  Annually (age 40 or over)  N/A    Pap (up to age 70 and after 70 if unknown history or abnormal study last 10 years)    N/A     The USPSTF recommends against screening for cervical cancer in women older than age 65 years who have had adequate prior screening and are not otherwise at high risk for cervical cancer.      Colorectal cancer screening (to age 75)    · Fecal occult blood test (annual)  · Flexible sigmoidoscopy (5y)  · Screening colonoscopy (10y)  · Barium enema   N/A    Diabetes self-management training (no USPSTF recommendations)  Requires referral by treating physician for patient with diabetes or renal disease. 10 hours of initial DSMT sessions of no less than 30 minutes each in a continuous 12-month period. 2 hours of follow-up DSMT in subsequent years.  Recommended to patient, ordered    Bone mass measurements (age 65 & older, biennial)  Requires diagnosis related to osteoporosis or estrogen deficiency. Biennial benefit unless patient has history of long-term glucocorticoid  Recommended to patient, ordered    Glaucoma screening (no USPSTF recommendation)  Diabetes mellitus, family  history   , age 50 or over    American, age 65 or over  Recommend follow up with eye care professional regularly    Medical nutrition therapy for diabetes or renal disease (no recommended schedule)  Requires referral by treating physician for patient with diabetes or renal disease or kidney transplant within the past 3 years.  Can be provided in same year as diabetes self-management training (DSMT), and CMS recommends medical nutrition therapy take place after DSMT. Up to 3 hours for initial year and 2 hours in subsequent years.  Recommended to patient, ordered    Cardiovascular screening blood tests (every 5 years)  · Fasting lipid panel  Order as a panel if possible  Recommended to patient, ordered    Diabetes screening tests (at least every 3 years, Medicare covers annually or at 6-month intervals for prediabetic patients)  · Fasting blood sugar (FBS) or glucose tolerance test (GTT)  Patient must be diagnosed with one of the following:       - Hypertension       - Dyslipidemia       - Obesity (BMI 30kg/m2)       - Previous elevated impaired FBS or GTT       ... or any two of the following:       - Overweight (BMI 25 but <30)       - Family history of diabetes       - Age 65 or older       - History of gestational diabetes or birth of baby weighing more than 9 pounds  Labs every 6 months    HIV screening (annually for increased risk patients)  · HIV-1 and HIV-2 by EIA, or BRIAN, rapid antibody test or oral mucosa transudate  Patients must be at increased risk for HIV infection per USPSTF guidelines or pregnant. Tests covered annually for patient at increased risk or as requested by the patient. Pregnant patients may receive up to 3 tests during pregnancy.  Risks discussed, screening is not recommended    Smoking cessation counseling (up to 8 sessions per year)  Patients must be asymptomatic of tobacco-related conditions to receive as a preventative service.  Non-smoker    Subsequent annual  wellness visit  At least 12 months since last AWV  Return in one year     The following information is provided to all patients.  This information is to help you find resources for any of the problems found today that may be affecting your health:                Living healthy guide: www.[facility].[place].[facility]      Understanding Diabetes: www.diabetes.org      Eating healthy: www.cdc.gov/healthyweight      CDC home safety checklist: www.cdc.gov/steadi/patient.html      Agency on Aging: www.goea.[place].[facility]      Alcoholics anonymous (AA): www.aa.org      Physical Activity: www.lori.nih.gov/zo6kbju      Tobacco use: www.quitwithusla.org

## 2017-12-13 PROBLEM — E11.22 TYPE 2 DIABETES MELLITUS WITH STAGE 3 CHRONIC KIDNEY DISEASE: Status: ACTIVE | Noted: 2017-12-13

## 2017-12-13 PROBLEM — N18.30 TYPE 2 DIABETES MELLITUS WITH STAGE 3 CHRONIC KIDNEY DISEASE: Status: ACTIVE | Noted: 2017-12-13

## 2017-12-15 ENCOUNTER — TELEPHONE (OUTPATIENT)
Dept: FAMILY MEDICINE | Facility: CLINIC | Age: 82
End: 2017-12-15

## 2018-01-09 ENCOUNTER — LAB VISIT (OUTPATIENT)
Dept: LAB | Facility: HOSPITAL | Age: 83
End: 2018-01-09
Attending: NURSE PRACTITIONER
Payer: MEDICARE

## 2018-01-09 ENCOUNTER — TELEPHONE (OUTPATIENT)
Dept: FAMILY MEDICINE | Facility: CLINIC | Age: 83
End: 2018-01-09

## 2018-01-09 DIAGNOSIS — I25.118 CORONARY ARTERY DISEASE OF NATIVE ARTERY OF NATIVE HEART WITH STABLE ANGINA PECTORIS: ICD-10-CM

## 2018-01-09 DIAGNOSIS — N18.30 TYPE 2 DIABETES MELLITUS WITH STAGE 3 CHRONIC KIDNEY DISEASE, WITHOUT LONG-TERM CURRENT USE OF INSULIN: ICD-10-CM

## 2018-01-09 DIAGNOSIS — E78.00 PURE HYPERCHOLESTEROLEMIA: ICD-10-CM

## 2018-01-09 DIAGNOSIS — E11.22 TYPE 2 DIABETES MELLITUS WITH STAGE 3 CHRONIC KIDNEY DISEASE, WITHOUT LONG-TERM CURRENT USE OF INSULIN: ICD-10-CM

## 2018-01-09 LAB
ALBUMIN SERPL BCP-MCNC: 4 G/DL
ALP SERPL-CCNC: 91 U/L
ALT SERPL W/O P-5'-P-CCNC: 17 U/L
ANION GAP SERPL CALC-SCNC: 7 MMOL/L
AST SERPL-CCNC: 19 U/L
BASOPHILS # BLD AUTO: 0.03 K/UL
BASOPHILS NFR BLD: 0.5 %
BILIRUB SERPL-MCNC: 0.6 MG/DL
BUN SERPL-MCNC: 23 MG/DL
CALCIUM SERPL-MCNC: 11 MG/DL
CHLORIDE SERPL-SCNC: 109 MMOL/L
CHOLEST SERPL-MCNC: 118 MG/DL
CHOLEST/HDLC SERPL: 2.3 {RATIO}
CO2 SERPL-SCNC: 26 MMOL/L
CREAT SERPL-MCNC: 1.2 MG/DL
DIFFERENTIAL METHOD: ABNORMAL
EOSINOPHIL # BLD AUTO: 0.1 K/UL
EOSINOPHIL NFR BLD: 2.1 %
ERYTHROCYTE [DISTWIDTH] IN BLOOD BY AUTOMATED COUNT: 13.4 %
EST. GFR  (AFRICAN AMERICAN): 46.6 ML/MIN/1.73 M^2
EST. GFR  (NON AFRICAN AMERICAN): 40.4 ML/MIN/1.73 M^2
ESTIMATED AVG GLUCOSE: 140 MG/DL
GLUCOSE SERPL-MCNC: 170 MG/DL
HBA1C MFR BLD HPLC: 6.5 %
HCT VFR BLD AUTO: 35.9 %
HDLC SERPL-MCNC: 51 MG/DL
HDLC SERPL: 43.2 %
HGB BLD-MCNC: 11.9 G/DL
IMM GRANULOCYTES # BLD AUTO: 0.01 K/UL
IMM GRANULOCYTES NFR BLD AUTO: 0.2 %
LDLC SERPL CALC-MCNC: 45.8 MG/DL
LYMPHOCYTES # BLD AUTO: 1.4 K/UL
LYMPHOCYTES NFR BLD: 22.8 %
MCH RBC QN AUTO: 29 PG
MCHC RBC AUTO-ENTMCNC: 33.1 G/DL
MCV RBC AUTO: 88 FL
MONOCYTES # BLD AUTO: 0.4 K/UL
MONOCYTES NFR BLD: 6.7 %
NEUTROPHILS # BLD AUTO: 4.1 K/UL
NEUTROPHILS NFR BLD: 67.7 %
NONHDLC SERPL-MCNC: 67 MG/DL
NRBC BLD-RTO: 0 /100 WBC
PLATELET # BLD AUTO: 206 K/UL
PMV BLD AUTO: 11.9 FL
POTASSIUM SERPL-SCNC: 5.1 MMOL/L
PROT SERPL-MCNC: 7.3 G/DL
RBC # BLD AUTO: 4.1 M/UL
SODIUM SERPL-SCNC: 142 MMOL/L
TRIGL SERPL-MCNC: 106 MG/DL
WBC # BLD AUTO: 6.1 K/UL

## 2018-01-09 PROCEDURE — 85025 COMPLETE CBC W/AUTO DIFF WBC: CPT

## 2018-01-09 PROCEDURE — 80053 COMPREHEN METABOLIC PANEL: CPT

## 2018-01-09 PROCEDURE — 36415 COLL VENOUS BLD VENIPUNCTURE: CPT | Mod: PO

## 2018-01-09 PROCEDURE — 83036 HEMOGLOBIN GLYCOSYLATED A1C: CPT

## 2018-01-09 PROCEDURE — 80061 LIPID PANEL: CPT

## 2018-01-09 NOTE — TELEPHONE ENCOUNTER
Left message informing patient of ANTHONY Pacheco's recommendation that a CMP was done and would yield better results that a BMP.

## 2018-01-09 NOTE — TELEPHONE ENCOUNTER
----- Message from Maxine Coronel sent at 1/9/2018 12:39 PM CST -----  Contact: self  Please call pt to discuss labs taken this morning. Pt states that they want to had a BMP. Please call pt to [phone].

## 2018-01-24 ENCOUNTER — OFFICE VISIT (OUTPATIENT)
Dept: FAMILY MEDICINE | Facility: CLINIC | Age: 83
End: 2018-01-24
Payer: MEDICARE

## 2018-01-24 VITALS
DIASTOLIC BLOOD PRESSURE: 58 MMHG | OXYGEN SATURATION: 95 % | SYSTOLIC BLOOD PRESSURE: 120 MMHG | HEART RATE: 74 BPM | BODY MASS INDEX: 26.57 KG/M2 | TEMPERATURE: 98 F | WEIGHT: 145.31 LBS

## 2018-01-24 DIAGNOSIS — N18.30 TYPE 2 DIABETES MELLITUS WITH STAGE 3 CHRONIC KIDNEY DISEASE, WITHOUT LONG-TERM CURRENT USE OF INSULIN: Primary | ICD-10-CM

## 2018-01-24 DIAGNOSIS — E11.42 TYPE 2 DIABETES MELLITUS WITH DIABETIC POLYNEUROPATHY, WITHOUT LONG-TERM CURRENT USE OF INSULIN: ICD-10-CM

## 2018-01-24 DIAGNOSIS — I20.89 STABLE ANGINA: ICD-10-CM

## 2018-01-24 DIAGNOSIS — E11.22 TYPE 2 DIABETES MELLITUS WITH STAGE 3 CHRONIC KIDNEY DISEASE, WITHOUT LONG-TERM CURRENT USE OF INSULIN: Primary | ICD-10-CM

## 2018-01-24 DIAGNOSIS — E11.51 TYPE 2 DIABETES MELLITUS WITH DIABETIC PERIPHERAL ANGIOPATHY WITHOUT GANGRENE, WITHOUT LONG-TERM CURRENT USE OF INSULIN: ICD-10-CM

## 2018-01-24 DIAGNOSIS — M26.623 BILATERAL TEMPOROMANDIBULAR JOINT PAIN: ICD-10-CM

## 2018-01-24 PROCEDURE — 99214 OFFICE O/P EST MOD 30 MIN: CPT | Mod: S$GLB,,, | Performed by: INTERNAL MEDICINE

## 2018-01-24 PROCEDURE — 99999 PR PBB SHADOW E&M-EST. PATIENT-LVL III: CPT | Mod: PBBFAC,,, | Performed by: INTERNAL MEDICINE

## 2018-01-24 RX ORDER — GLIPIZIDE 2.5 MG/1
2.5 TABLET, EXTENDED RELEASE ORAL
Qty: 30 TABLET | Refills: 1 | Status: SHIPPED | OUTPATIENT
Start: 2018-01-24 | End: 2018-09-06 | Stop reason: SDUPTHER

## 2018-01-24 RX ORDER — METOPROLOL SUCCINATE 25 MG/1
TABLET, EXTENDED RELEASE ORAL
COMMUNITY
Start: 2017-11-20 | End: 2018-09-06 | Stop reason: SDUPTHER

## 2018-01-24 RX ORDER — METFORMIN HYDROCHLORIDE 500 MG/1
1000 TABLET ORAL 2 TIMES DAILY WITH MEALS
Qty: 90 TABLET | Refills: 3
Start: 2018-01-24 | End: 2018-09-06 | Stop reason: SDUPTHER

## 2018-01-24 RX ORDER — IBUPROFEN 400 MG/1
TABLET ORAL
COMMUNITY
Start: 2017-11-07

## 2018-01-24 RX ORDER — GLIPIZIDE 2.5 MG/1
2.5 TABLET, EXTENDED RELEASE ORAL
Qty: 30 TABLET | Refills: 1 | Status: SHIPPED | OUTPATIENT
Start: 2018-01-24 | End: 2018-01-24 | Stop reason: SDUPTHER

## 2018-01-24 NOTE — PROGRESS NOTES
SUBJECTIVE     Chief Complaint   Patient presents with    Diabetes    Follow-up     ENT told her that there was nothing they can do about her TMJ. Concerned    Sore Throat    Medication Dose Change     Insurance does not cover Tradjenta anymore       HPI  Tonya Marquez is a 88 y.o. female with multiple medical diagnoses as listed in the medical history and problem list that presents for follow-up for DM2. Pt was just taking Metformin solely, but was started on Tradjenta. She was informed by her insurer that she will soon be in the donut hole if she continues Tradjenta. She reports are fasting blood sugar levels are wnl and she never recalls seeing values >130. She is mostly compliant with an ADA diet.     PAST MEDICAL HISTORY:  Past Medical History:   Diagnosis Date    Coronary artery disease     Diabetes mellitus type I     Hyperlipidemia     Hypertension        PAST SURGICAL HISTORY:  Past Surgical History:   Procedure Laterality Date    ADENOIDECTOMY      CATARACT EXTRACTION, BILATERAL      CORONARY STENT PLACEMENT  08/21/2016    dr. ruiz       SOCIAL HISTORY:  Social History     Social History    Marital status:      Spouse name: N/A    Number of children: N/A    Years of education: N/A     Occupational History    Not on file.     Social History Main Topics    Smoking status: Never Smoker    Smokeless tobacco: Never Used    Alcohol use Not on file    Drug use: Unknown    Sexual activity: Not on file     Other Topics Concern    Not on file     Social History Narrative    No narrative on file       FAMILY HISTORY:  History reviewed. No pertinent family history.    ALLERGIES AND MEDICATIONS: updated and reviewed.  Review of patient's allergies indicates:   Allergen Reactions    Amoxicillin     Bactrim [sulfamethoxazole-trimethoprim]      Current Outpatient Prescriptions   Medication Sig Dispense Refill    ascorbic acid, vitamin C, (VITAMIN C) 100 MG tablet Take 500 mg by  mouth once daily.      aspirin (ECOTRIN) 81 MG EC tablet Take 81 mg by mouth once daily.      atorvastatin (LIPITOR) 40 MG tablet Take 1 tablet (40 mg total) by mouth once daily. 90 tablet 3    clopidogrel (PLAVIX) 75 mg tablet Take 1 tablet (75 mg total) by mouth once daily. 90 tablet 3    enalapril (VASOTEC) 20 MG tablet Take 1 tablet (20 mg total) by mouth once daily. 90 tablet 3    fluticasone (FLONASE) 50 mcg/actuation nasal spray 2 sprays by Each Nare route once daily. 1 Bottle 1    ibuprofen (ADVIL,MOTRIN) 400 MG tablet       isosorbide mononitrate (IMDUR) 60 MG 24 hr tablet Take 1 tablet (60 mg total) by mouth once daily. 90 tablet 3    metFORMIN (GLUCOPHAGE) 500 MG tablet Take 2 tablets (1,000 mg total) by mouth 2 (two) times daily with meals. 2 am and 1 pm 90 tablet 3    metoprolol succinate (TOPROL-XL) 25 MG 24 hr tablet       metoprolol tartrate (LOPRESSOR) 25 MG tablet Take 1 tablet (25 mg total) by mouth Daily. 90 tablet 3    nitroGLYCERIN (NITROSTAT) 0.3 MG SL tablet Place 1 tablet (0.3 mg total) under the tongue every 5 (five) minutes as needed for Chest pain. 25 tablet 1    senna-docusate 8.6-50 mg (PERICOLACE) 8.6-50 mg per tablet Take 1 tablet by mouth once daily.      blood sugar diagnostic Strp Check  each 3    glipiZIDE (GLUCOTROL) 2.5 MG TR24 Take 1 tablet (2.5 mg total) by mouth daily with breakfast. 30 tablet 1     No current facility-administered medications for this visit.        ROS  Review of Systems   Constitutional: Negative for chills and fever.   HENT: Negative for hearing loss and sore throat.    Eyes: Negative for visual disturbance.   Respiratory: Negative for cough and shortness of breath.    Cardiovascular: Negative for chest pain, palpitations and leg swelling.   Gastrointestinal: Negative for abdominal pain, constipation, diarrhea, nausea and vomiting.   Genitourinary: Negative for dysuria, frequency and urgency.   Musculoskeletal: Positive for arthralgias  (B/L TMJ). Negative for joint swelling and myalgias.   Skin: Negative for rash and wound.   Neurological: Negative for headaches.   Psychiatric/Behavioral: Negative for agitation and confusion. The patient is not nervous/anxious.          OBJECTIVE     Physical Exam  Vitals:    01/24/18 1515   BP: (!) 120/58   Pulse: 74   Temp: 98.2 °F (36.8 °C)    Body mass index is 26.57 kg/m².  Weight: 65.9 kg (145 lb 4.5 oz)         Physical Exam   Constitutional: She is oriented to person, place, and time. She appears well-developed and well-nourished. No distress.   HENT:   Head: Normocephalic and atraumatic.   Right Ear: Hearing, tympanic membrane and external ear normal.   Left Ear: Hearing, tympanic membrane and external ear normal.   Nose: Nose normal.   Mouth/Throat: No uvula swelling. Posterior oropharyngeal erythema present. No posterior oropharyngeal edema.   Eyes: Conjunctivae and EOM are normal. Right eye exhibits no discharge. Left eye exhibits no discharge. No scleral icterus.   Neck: Normal range of motion. Neck supple. No JVD present. No tracheal deviation present.   Cardiovascular: Normal rate, regular rhythm and intact distal pulses.  Exam reveals no gallop and no friction rub.    No murmur heard.  Pulmonary/Chest: Effort normal and breath sounds normal. No respiratory distress. She has no wheezes.   Abdominal: Soft. Bowel sounds are normal. She exhibits no distension and no mass. There is no tenderness. There is no rebound and no guarding.   Musculoskeletal: Normal range of motion. She exhibits no edema, tenderness or deformity.   Neurological: She is alert and oriented to person, place, and time. She exhibits normal muscle tone. Coordination normal.   Skin: Skin is warm and dry. No rash noted. No erythema.   Psychiatric: She has a normal mood and affect. Her behavior is normal. Judgment and thought content normal.         Health Maintenance       Date Due Completion Date    TETANUS VACCINE 09/07/2015 9/7/2005     Eye Exam 02/06/2018 2/6/2017 (Done)    Override on 2/6/2017: Done (Dr. Lindsay)    Hemoglobin A1c 07/09/2018 1/9/2018    Pneumococcal (65+) (2 of 2 - PPSV23) 12/12/2018 12/12/2017    Foot Exam 12/12/2018 12/12/2017    Override on 6/12/2017: Done    Lipid Panel 01/09/2019 1/9/2018            ASSESSMENT     88 y.o. female with     1. Type 2 diabetes mellitus with stage 3 chronic kidney disease, without long-term current use of insulin    2. Type 2 diabetes mellitus with diabetic polyneuropathy, without long-term current use of insulin    3. Type 2 diabetes mellitus with diabetic peripheral angiopathy without gangrene, without long-term current use of insulin    4. Stable angina    5. Bilateral temporomandibular joint pain        PLAN:     1. Type 2 diabetes mellitus with stage 3 chronic kidney disease, without long-term current use of insulin  - Pt can no longer afford Tradjenta, so will discontinue and start trial course of Glipizide as below  - Pt to monitor blood glucose levels with goal ranges from ; pt to call if blood glucose levels out of range  - metFORMIN (GLUCOPHAGE) 500 MG tablet; Take 2 tablets (1,000 mg total) by mouth 2 (two) times daily with meals. 2 am and 1 pm  Dispense: 90 tablet; Refill: 3  - glipiZIDE (GLUCOTROL) 2.5 MG TR24; Take 1 tablet (2.5 mg total) by mouth daily with breakfast.  Dispense: 30 tablet; Refill: 1    2. Type 2 diabetes mellitus with diabetic polyneuropathy, without long-term current use of insulin  - Stable; no acute issues  - The current medical regimen is effective;  continue present plan and medications.    3. Type 2 diabetes mellitus with diabetic peripheral angiopathy without gangrene, without long-term current use of insulin  - Stable; no acute issues  - The current medical regimen is effective;  continue present plan and medications.    4. Stable angina  - Stable; no acute issues  - The current medical regimen is effective;  continue present plan and  medications.    5. Bilateral temporomandibular joint pain  - Stable; no acute issues  - Pt given number to call and f/u with Dr. Young for tyra; she voiced understanding        RTC in 4 weeks for repeat assessment of current treatment plan       Gretchen Pena MD  01/24/2018 3:18 PM        No Follow-up on file.

## 2018-01-30 ENCOUNTER — TELEPHONE (OUTPATIENT)
Dept: FAMILY MEDICINE | Facility: CLINIC | Age: 83
End: 2018-01-30

## 2018-01-30 DIAGNOSIS — M26.623 BILATERAL TEMPOROMANDIBULAR JOINT PAIN: Primary | ICD-10-CM

## 2018-01-30 NOTE — TELEPHONE ENCOUNTER
----- Message from Maru Hernandes sent at 1/29/2018  1:51 PM CST -----  Contact: self  Patient called stating that refill glipiZIDE (GLUCOTROL) 2.5 MG TR24 can't be filled until  2/16. Patient has some of old medication. Was advised to contact office. Please contact her at [phone].    Thanks!

## 2018-01-30 NOTE — TELEPHONE ENCOUNTER
Patient contacted regarding her message and she states that the referral that was given for Dr. Coronel only accepts private pay patient, and she has been having jaw and ear pain.

## 2018-01-30 NOTE — TELEPHONE ENCOUNTER
Called pt and informed her that I'd order a referral for her to be seen at [facility] since we don't have an Oral Maxillofacial surgeon available. Pt advised to call [facility] is she is not called for an appt soon. She voiced understanding.

## 2018-03-06 DIAGNOSIS — N18.30 TYPE 2 DIABETES MELLITUS WITH STAGE 3 CHRONIC KIDNEY DISEASE, WITHOUT LONG-TERM CURRENT USE OF INSULIN: ICD-10-CM

## 2018-03-06 DIAGNOSIS — E11.22 TYPE 2 DIABETES MELLITUS WITH STAGE 3 CHRONIC KIDNEY DISEASE, WITHOUT LONG-TERM CURRENT USE OF INSULIN: ICD-10-CM

## 2018-03-06 RX ORDER — GLIPIZIDE 2.5 MG/1
TABLET, EXTENDED RELEASE ORAL
Qty: 90 TABLET | Refills: 1 | Status: SHIPPED | OUTPATIENT
Start: 2018-03-06 | End: 2018-03-08 | Stop reason: SDUPTHER

## 2018-03-08 ENCOUNTER — OFFICE VISIT (OUTPATIENT)
Dept: VASCULAR SURGERY | Facility: CLINIC | Age: 83
End: 2018-03-08
Payer: MEDICARE

## 2018-03-08 ENCOUNTER — HOSPITAL ENCOUNTER (OUTPATIENT)
Dept: RADIOLOGY | Facility: HOSPITAL | Age: 83
Discharge: HOME | End: 2018-03-08
Attending: SURGERY
Payer: MEDICARE

## 2018-03-08 VITALS
BODY MASS INDEX: 26.05 KG/M2 | SYSTOLIC BLOOD PRESSURE: 128 MMHG | DIASTOLIC BLOOD PRESSURE: 60 MMHG | HEIGHT: 62 IN | WEIGHT: 141.56 LBS

## 2018-03-08 DIAGNOSIS — I87.2 VENOUS INSUFFICIENCY OF BOTH LOWER EXTREMITIES: ICD-10-CM

## 2018-03-08 DIAGNOSIS — I87.2 VENOUS INSUFFICIENCY OF RIGHT LEG: Primary | ICD-10-CM

## 2018-03-08 DIAGNOSIS — I83.90 VARICOSE VEIN OF LEG: ICD-10-CM

## 2018-03-08 DIAGNOSIS — I86.8 SPIDER VARICOSE VEIN: ICD-10-CM

## 2018-03-08 PROCEDURE — 99999 PR PBB SHADOW E&M-EST. PATIENT-LVL III: CPT | Mod: PBBFAC,,, | Performed by: SURGERY

## 2018-03-08 PROCEDURE — 99214 OFFICE O/P EST MOD 30 MIN: CPT | Mod: S$GLB,,, | Performed by: SURGERY

## 2018-03-08 PROCEDURE — 93970 EXTREMITY STUDY: CPT | Mod: TC

## 2018-03-08 PROCEDURE — 93970 EXTREMITY STUDY: CPT | Mod: 26,,, | Performed by: RADIOLOGY

## 2018-03-08 NOTE — PROGRESS NOTES
Robert Love MD [facility]                         03/08/2018    HPI:  Tonya Marquez is a 89 y.o. female with   Patient Active Problem List   Diagnosis    Old MI (myocardial infarction)    Venous insufficiency of both lower extremities    Pure hypercholesterolemia    Stable angina    Type 2 diabetes mellitus with diabetic polyneuropathy, without long-term current use of insulin    TMJ arthralgia    Chronic idiopathic constipation    Chronic kidney disease (CKD), stage III (moderate)    Coronary artery disease of native artery of native heart with stable angina pectoris    Diabetes mellitus type 2 with retinopathy    Type 2 diabetes mellitus with stage 3 chronic kidney disease   s/p MI 2016 requiring coronary stenting at [facility] being managed by PCP and specialists who is here today for evaluation of BLE varicose veins and edema.  She is s/p L GSV stripping 20-30 yrs ago and L SSV sclerotherapy 2015.  Cont to have c/o BLE pain.  Describes BLE pain nightly.  Patient states location is bilateral calf occurring for 2 years.  Associated signs and symptoms are none.  Quality is cramping and severity is 5/10.  Symptoms began 2 yrs ago after L SSV sclerotherapy.  Alleviating factors include exercise and pain medication.  Worsening factors include dependency.  Denies BLE edema, inflammatory symptoms and wounds.    yes MI  no Stroke  Tobacco use: never smoker    Interval history: Wearing stockings, BLE pain improved.  Ambulating well.      Past Medical History:   Diagnosis Date    Coronary artery disease     Diabetes mellitus type I     Hyperlipidemia     Hypertension      Past Surgical History:   Procedure Laterality Date    ADENOIDECTOMY      CATARACT EXTRACTION, BILATERAL      CORONARY STENT PLACEMENT  08/21/2016    dr. ruiz     No family history on file.  Social History     Social History    Marital status:      Spouse name: N/A     Number of children: N/A    Years of education: N/A     Occupational History    Not on file.     Social History Main Topics    Smoking status: Never Smoker    Smokeless tobacco: Never Used    Alcohol use Not on file    Drug use: Unknown    Sexual activity: Not on file     Other Topics Concern    Not on file     Social History Narrative    No narrative on file       Current Outpatient Prescriptions:     ascorbic acid, vitamin C, (VITAMIN C) 100 MG tablet, Take 500 mg by mouth once daily., Disp: , Rfl:     aspirin (ECOTRIN) 81 MG EC tablet, Take 81 mg by mouth once daily., Disp: , Rfl:     atorvastatin (LIPITOR) 40 MG tablet, Take 1 tablet (40 mg total) by mouth once daily., Disp: 90 tablet, Rfl: 3    blood sugar diagnostic Strp, Check BID, Disp: 150 each, Rfl: 3    clopidogrel (PLAVIX) 75 mg tablet, Take 1 tablet (75 mg total) by mouth once daily., Disp: 90 tablet, Rfl: 3    enalapril (VASOTEC) 20 MG tablet, Take 1 tablet (20 mg total) by mouth once daily., Disp: 90 tablet, Rfl: 3    glipiZIDE (GLUCOTROL) 2.5 MG TR24, Take 1 tablet (2.5 mg total) by mouth daily with breakfast., Disp: 30 tablet, Rfl: 1    ibuprofen (ADVIL,MOTRIN) 400 MG tablet, , Disp: , Rfl:     isosorbide mononitrate (IMDUR) 60 MG 24 hr tablet, Take 1 tablet (60 mg total) by mouth once daily., Disp: 90 tablet, Rfl: 3    metFORMIN (GLUCOPHAGE) 500 MG tablet, Take 2 tablets (1,000 mg total) by mouth 2 (two) times daily with meals. 2 am and 1 pm, Disp: 90 tablet, Rfl: 3    metoprolol succinate (TOPROL-XL) 25 MG 24 hr tablet, , Disp: , Rfl:     metoprolol tartrate (LOPRESSOR) 25 MG tablet, Take 1 tablet (25 mg total) by mouth Daily., Disp: 90 tablet, Rfl: 3    nitroGLYCERIN (NITROSTAT) 0.3 MG SL tablet, Place 1 tablet (0.3 mg total) under the tongue every 5 (five) minutes as needed for Chest pain., Disp: 25 tablet, Rfl: 1    senna-docusate 8.6-50 mg (PERICOLACE) 8.6-50 mg per tablet, Take 1 tablet by mouth once daily., Disp: ,  Rfl:     REVIEW OF SYSTEMS:  General: No fevers or chills; ENT: No sore throat; Allergy and Immunology: no persistent infections; Hematological and Lymphatic: No history of bleeding or easy bruising; Endocrine: negative; Respiratory: no cough, shortness of breath, or wheezing; Cardiovascular: no chest pain or dyspnea on exertion; Gastrointestinal: no abdominal pain/back, change in bowel habits, or bloody stools; Genito-Urinary: no dysuria, trouble voiding, or hematuria; Musculoskeletal: negative; Neurological: no TIA or stroke symptoms; Psychiatric: no nervousness, anxiety or depression.    PHYSICAL EXAM:   Right Arm BP - Sittin/60 (18 1058)  Left Arm BP - Sittin/60 (18 1058)            General appearance:  Alert, well-appearing, and in no distress.  Oriented to person, place, and time                    Neurological: Normal speech, no focal findings noted; CN II - XII grossly intact. RLE with sensation to light touch, LLE with sensation to light touch.            Musculoskeletal: Digits/nail without cyanosis/clubbing.  Strength 5/5 BLE.                    Neck: Supple, no significant adenopathy, no carotid bruit can be auscultated                  Chest:  Clear to auscultation, no wheezes, rales or rhonchi, symmetric air entry. No use of accessory muscles               Cardiac: Normal rate and regular rhythm, S1 and S2 normal            Abdomen: Soft, nontender, nondistended, no masses or organomegaly, no hernia     No rebound tenderness noted; bowel sounds normal     No groin adenopathy      Extremities:   2+ R femoral pulse, 2+ L femoral pulse     1+ R popliteal pulse, 1+ L popliteal pulse     1+ R PT pulse, 1+ L PT pulse     2+ R DP pulse, 2+ L DP pulse     1+ RLE edema, 1+ LLE edema    Skin: RLE without ulcer; LLE without ulcer     Bilateral LE varicose veins, no thrombophlebitis, bilateral LE reticular veins, minimal edema BLE     CEAP RLE 3, 3    LAB RESULTS:  No results found for:  CBC  Lab Results   Component Value Date    LABPROT 10.3 09/24/2010    INR 1.0 09/24/2010     Lab Results   Component Value Date     01/09/2018    K 5.1 01/09/2018     01/09/2018    CO2 26 01/09/2018     (H) 01/09/2018    BUN 23 01/09/2018    CREATININE 1.2 01/09/2018    CALCIUM 11.0 (H) 01/09/2018    ANIONGAP 7 (L) 01/09/2018    EGFRNONAA 40.4 (A) 01/09/2018     Lab Results   Component Value Date    WBC 6.10 01/09/2018    RBC 4.10 01/09/2018    HGB 11.9 (L) 01/09/2018    HCT 35.9 (L) 01/09/2018    MCV 88 01/09/2018    MCH 29.0 01/09/2018    MCHC 33.1 01/09/2018    RDW 13.4 01/09/2018     01/09/2018    MPV 11.9 01/09/2018    GRAN 4.1 01/09/2018    GRAN 67.7 01/09/2018    LYMPH 1.4 01/09/2018    LYMPH 22.8 01/09/2018    MONO 0.4 01/09/2018    MONO 6.7 01/09/2018    EOS 0.1 01/09/2018    BASO 0.03 01/09/2018    EOSINOPHIL 2.1 01/09/2018    BASOPHIL 0.5 01/09/2018    DIFFMETHOD Automated 01/09/2018     .  Lab Results   Component Value Date    HGBA1C 6.5 (H) 01/09/2018       IMAGING:  All pertinent imaging has been reviewed and interpreted independently.    No outside imaging available.    CORA 1/1    No DVT.  R GSV reflux.  L GSV stripped.  L SSV without reflux.    IMP/PLAN:  88 y.o. female with   Patient Active Problem List   Diagnosis    Old MI (myocardial infarction)    PAD (peripheral artery disease)    Pure hypercholesterolemia    Stable angina    Type 2 diabetes mellitus, without long-term current use of insulin    TMJ arthralgia    Chronic idiopathic constipation    Chronic kidney disease (CKD), stage III (moderate)   s/p MI 2016 requiring coronary stenting at [facility] being managed by PCP and specialists who is here today for evaluation of BLE varicose veins and edema.    -R GSV reflux at mid thigh and below knee, no LLE reflux, BLE varicose veins with resolution of pain and edema with medical therapy, no skin changes- recommend compression with Rx stockings, elevation, dietary  changes associated with water and sodium intake discussed at length with patient  -No surgical intervention indicated at this time  -Will see pt back in 6 months for further evaluation    I spent 45 minutes evaluating this patient and greater than 50% of the time was spent counseling, coordinator care and discussing the plan of care.  All questions were answered and patient stated understanding with agreement with the above treatment plan.    Robert Love MD [facility]  Vascular and Endovascular Surgery

## 2018-03-08 NOTE — LETTER
March 8, 2018        Bunny Kuo MD  [address]  [place] [address]             [facility]  [address] [facility] [address]  [place] [address]  Phone: [phone]  Fax: [phone]   Patient: Tonya Marquez   MR Number: [id number]   YOB: 1929   Date of Visit: 3/8/2018       Dear Dr. Kuo:    Thank you for referring Tonya Marquez to me for evaluation. Her BLE symptoms have improved with conservative treatment.  She will continue with our treament plan and I will see her back in 6 mo for further evaluation.  Below are the relevant portions of my assessment and plan of care.        Please let me know if there is anything else that I can do to help you.      If you have questions, please do not hesitate to call me. I look forward to following Tonya along with you.    Sincerely,      Robert Love MD           CC  No Recipients

## 2018-03-08 NOTE — PATIENT INSTRUCTIONS
Self-Care for Spider and Varicose Veins  Your healthcare provider may suggest that you try self-care. Exercising and maintaining a healthy weight may keep problem veins from getting worse. Wearing elastic stockings and elevating your legs can help improve blood flow. Taking breaks when you sit or stand helps, too.     The top of the elastic stocking should be below the bend in your knee for a proper fit.   Exercising  Exercising is good for your veins because it improves blood flow. Walking, cycling, or swimming are great exercises for vein health. But be sure to check with your healthcare provider before starting any exercise program. Also, keep these hints in mind:  · When exercising, start out slowly and try to build up to 30 minutes on most days.  · Elevate your legs above heart level after exercise to keep blood from pooling in veins.  Maintaining a healthy weight  Being overweight puts extra pressure on your veins. To maintain a healthy weight, try these tips:  · Choose lean meats, fish, and skinless chicken.  · Use low-fat dairy products.  · Eat foods high in fiber, such as whole grains, fruits, and vegetables.  · Cut down on sugar, salt, and saturated and trans fats.  · Exercise regularly.  Wearing elastic stockings  Elastic stockings gently squeeze veins so blood flows upward. If you need elastic stockings, your healthcare provider can prescribe them for you. Follow your healthcare providers advice about how and when to wear them. Elastic stockings come in several different levels of pressure. Ask your healthcare provider which level of pressure would benefit you the most.   Elevating your legs  Raising your legs above heart level will help relieve swelling and keep blood from pooling in veins. Try to elevate your legs for 15 to 20 minutes at the end of the day, and whenever youre relaxing. To make sure your legs are raised above heart level, prop them up on cushions or large pillows.  When sitting and  standing  To keep blood moving when you have to sit or stand for long periods, try these tips:  · At work, take walking breaks instead of coffee breaks. Walk during your lunch hour. Or try flexing your feet up and down 10 times each hour.  · When standing, raise yourself up and down on your toes, or rock back and forth on your heels.  Date Last Reviewed: 5/1/2016 © 2000-2017 [employer]. [address] [place] [address]. All rights reserved. This information is not intended as a substitute for professional medical care. Always follow your healthcare professional's instructions.        Varicose Veins     Varicose veins are swollen, enlarged veins most often found in the legs. They are usually blue or purple in color and may bulge, twist, and stand out under the skin.  Normally, veins return blood from the body to the heart. The leg veins have one-way valves that prevent blood from flowing backward in the vein. When the valves are weak or damaged, blood backs up in the veins. This may cause some of the veins to swell and bulge and become varicose veins.  Symptoms  Varicose veins may or may not cause symptoms. If symptoms do occur, they can include:  · Legs that feel tired, achy, heavy, or itchy  · Leg muscle cramps  · Skin changes, such as discoloration, dryness, redness, or rash (in more severe cases, you may also have sores on the skin called venous leg ulcers)  Risk Factors  There are a number of factors that increase the risk for varicose veins. These can include:  · Being a woman  · Being older  · Sitting or standing for long periods  · Being overweight  · Being pregnant  · Having a family history of varicose veins  Treatment begins with simple self-help measures (see below). If these dont help, there are many procedures that can be done to shrink or remove varicose veins. Your healthcare provider can tell you more about these options, if needed.  Home care  · Support or compression  stockings will likely be prescribed. If so, be sure to wear them as directed. They may help improve blood flow.  · Exercising helps strengthen your leg muscles and improve blood flow. To get the most benefit, choose exercises such as walking, swimming, or cycling. Also try to exercise for at least 30 minutes on most days.  · Raising (elevating) your legs lets gravity help blood flow back to the heart. Sit or lie with your feet above heart level a few times throughout the day, or as directed.  · Avoid long periods of sitting or standing. Change positions often. Also, move your ankles, toes and knees often. This may also help improve blood flow.  · If you are overweight, talk with your healthcare provider about setting up a weight-loss plan. Maintaining a healthy weight can help reduce the strain on your veins. It may also improve symptoms, such as swelling and aching.  · If you have dryness and itching, ask your provider about special lotions that can be applied to the skin to help improve symptoms.  Follow-up care  Follow up with your healthcare provider, or as directed. If imaging tests were done, youll be told the results and if there are any new findings that affect your care.  When to seek medical advice  Call your healthcare provider right away if any of these occur:  · Sudden, severe leg swelling, pain, or redness  · Symptoms worsen, or they dont improve with self-care  · Bleeding from any affected veins  · Ulcers form on the legs, ankles, or feet  · Fever of 100.4°F (38°C) or higher, or as advised by your provider  Date Last Reviewed: 9/21/2015 © 2000-2017 [employer]. [address] [place] [address], [place] [address]. All rights reserved. This information is not intended as a substitute for professional medical care. Always follow your healthcare professional's instructions.

## 2018-06-04 DIAGNOSIS — I20.89 STABLE ANGINA: ICD-10-CM

## 2018-06-04 RX ORDER — ISOSORBIDE MONONITRATE 60 MG/1
TABLET, EXTENDED RELEASE ORAL
Qty: 90 TABLET | Refills: 0 | Status: SHIPPED | OUTPATIENT
Start: 2018-06-04 | End: 2018-09-06 | Stop reason: SDUPTHER

## 2018-06-04 RX ORDER — CLOPIDOGREL BISULFATE 75 MG/1
TABLET ORAL
Qty: 90 TABLET | Refills: 0 | Status: SHIPPED | OUTPATIENT
Start: 2018-06-04 | End: 2018-09-06 | Stop reason: SDUPTHER

## 2018-07-06 DIAGNOSIS — I20.89 STABLE ANGINA: ICD-10-CM

## 2018-07-06 DIAGNOSIS — E11.51 TYPE 2 DIABETES MELLITUS WITH DIABETIC PERIPHERAL ANGIOPATHY WITHOUT GANGRENE, WITHOUT LONG-TERM CURRENT USE OF INSULIN: ICD-10-CM

## 2018-07-06 RX ORDER — ATORVASTATIN CALCIUM 40 MG/1
TABLET, FILM COATED ORAL
Qty: 90 TABLET | Refills: 1 | Status: SHIPPED | OUTPATIENT
Start: 2018-07-06 | End: 2018-09-06 | Stop reason: SDUPTHER

## 2018-07-06 RX ORDER — ENALAPRIL MALEATE 20 MG/1
TABLET ORAL
Qty: 90 TABLET | Refills: 1 | Status: SHIPPED | OUTPATIENT
Start: 2018-07-06 | End: 2018-09-06 | Stop reason: SDUPTHER

## 2018-08-13 ENCOUNTER — LAB VISIT (OUTPATIENT)
Dept: LAB | Facility: HOSPITAL | Age: 83
End: 2018-08-13
Attending: INTERNAL MEDICINE
Payer: MEDICARE

## 2018-08-13 ENCOUNTER — OFFICE VISIT (OUTPATIENT)
Dept: FAMILY MEDICINE | Facility: CLINIC | Age: 83
End: 2018-08-13
Payer: MEDICARE

## 2018-08-13 VITALS
WEIGHT: 151.44 LBS | OXYGEN SATURATION: 99 % | DIASTOLIC BLOOD PRESSURE: 82 MMHG | HEIGHT: 62 IN | HEART RATE: 70 BPM | SYSTOLIC BLOOD PRESSURE: 130 MMHG | BODY MASS INDEX: 27.87 KG/M2 | RESPIRATION RATE: 18 BRPM | TEMPERATURE: 98 F

## 2018-08-13 DIAGNOSIS — M26.623 BILATERAL TEMPOROMANDIBULAR JOINT PAIN: ICD-10-CM

## 2018-08-13 DIAGNOSIS — E11.22 TYPE 2 DIABETES MELLITUS WITH STAGE 3 CHRONIC KIDNEY DISEASE, WITHOUT LONG-TERM CURRENT USE OF INSULIN: ICD-10-CM

## 2018-08-13 DIAGNOSIS — M79.645 PAIN OF LEFT MIDDLE FINGER: ICD-10-CM

## 2018-08-13 DIAGNOSIS — M79.645 PAIN OF LEFT MIDDLE FINGER: Primary | ICD-10-CM

## 2018-08-13 DIAGNOSIS — N18.30 TYPE 2 DIABETES MELLITUS WITH STAGE 3 CHRONIC KIDNEY DISEASE, WITHOUT LONG-TERM CURRENT USE OF INSULIN: ICD-10-CM

## 2018-08-13 LAB
ALBUMIN SERPL BCP-MCNC: 4.4 G/DL
ALP SERPL-CCNC: 83 U/L
ALT SERPL W/O P-5'-P-CCNC: 16 U/L
ANION GAP SERPL CALC-SCNC: 7 MMOL/L
AST SERPL-CCNC: 18 U/L
BASOPHILS # BLD AUTO: 0.01 K/UL
BASOPHILS NFR BLD: 0.1 %
BILIRUB SERPL-MCNC: 0.7 MG/DL
BUN SERPL-MCNC: 28 MG/DL
CALCIUM SERPL-MCNC: 10.4 MG/DL
CHLORIDE SERPL-SCNC: 107 MMOL/L
CO2 SERPL-SCNC: 25 MMOL/L
CREAT SERPL-MCNC: 1.2 MG/DL
CRP SERPL-MCNC: 0.8 MG/L
DIFFERENTIAL METHOD: ABNORMAL
EOSINOPHIL # BLD AUTO: 0.2 K/UL
EOSINOPHIL NFR BLD: 2.2 %
ERYTHROCYTE [DISTWIDTH] IN BLOOD BY AUTOMATED COUNT: 13 %
ERYTHROCYTE [SEDIMENTATION RATE] IN BLOOD BY WESTERGREN METHOD: 21 MM/HR
EST. GFR  (AFRICAN AMERICAN): 46 ML/MIN/1.73 M^2
EST. GFR  (NON AFRICAN AMERICAN): 40 ML/MIN/1.73 M^2
GLUCOSE SERPL-MCNC: 111 MG/DL
HCT VFR BLD AUTO: 34.1 %
HGB BLD-MCNC: 11.7 G/DL
LYMPHOCYTES # BLD AUTO: 2.1 K/UL
LYMPHOCYTES NFR BLD: 30.4 %
MCH RBC QN AUTO: 29.8 PG
MCHC RBC AUTO-ENTMCNC: 34.3 G/DL
MCV RBC AUTO: 87 FL
MONOCYTES # BLD AUTO: 0.5 K/UL
MONOCYTES NFR BLD: 7.7 %
NEUTROPHILS # BLD AUTO: 4 K/UL
NEUTROPHILS NFR BLD: 59.3 %
PLATELET # BLD AUTO: 211 K/UL
PMV BLD AUTO: 11.2 FL
POTASSIUM SERPL-SCNC: 4.7 MMOL/L
PROT SERPL-MCNC: 7.4 G/DL
RBC # BLD AUTO: 3.92 M/UL
SODIUM SERPL-SCNC: 139 MMOL/L
URATE SERPL-MCNC: 6.1 MG/DL
WBC # BLD AUTO: 6.74 K/UL

## 2018-08-13 PROCEDURE — 80053 COMPREHEN METABOLIC PANEL: CPT

## 2018-08-13 PROCEDURE — 83036 HEMOGLOBIN GLYCOSYLATED A1C: CPT

## 2018-08-13 PROCEDURE — 86200 CCP ANTIBODY: CPT

## 2018-08-13 PROCEDURE — 86431 RHEUMATOID FACTOR QUANT: CPT

## 2018-08-13 PROCEDURE — 36415 COLL VENOUS BLD VENIPUNCTURE: CPT | Mod: PN

## 2018-08-13 PROCEDURE — 85025 COMPLETE CBC W/AUTO DIFF WBC: CPT

## 2018-08-13 PROCEDURE — 99213 OFFICE O/P EST LOW 20 MIN: CPT | Mod: S$GLB,,, | Performed by: INTERNAL MEDICINE

## 2018-08-13 PROCEDURE — 84550 ASSAY OF BLOOD/URIC ACID: CPT

## 2018-08-13 PROCEDURE — 86140 C-REACTIVE PROTEIN: CPT

## 2018-08-13 PROCEDURE — 99999 PR PBB SHADOW E&M-EST. PATIENT-LVL IV: CPT | Mod: PBBFAC,,, | Performed by: INTERNAL MEDICINE

## 2018-08-13 PROCEDURE — 85652 RBC SED RATE AUTOMATED: CPT

## 2018-08-13 NOTE — PROGRESS NOTES
SUBJECTIVE     Chief Complaint   Patient presents with    Establish Care       HPI  Tonya Marquez is a 89 y.o. female with multiple medical diagnoses as listed in the medical history and problem list that presents for evaluation of L 3rd digit painful finger x 4-5 months. Pt reports inability to straighten her finger completely started the cascade of events and has led to some discomfort and a tremor. Denies any preceding trauma, falls, or heavy lifting. She is bothered because she is L handed. Pt reports pain is aching at 5/10 and constant in nature at the PIP joint without radiation. She has been applying heat and taking 1/2 tab of Tylenol or Motrin as needed for pain. This has never happened before.     PAST MEDICAL HISTORY:  Past Medical History:   Diagnosis Date    Coronary artery disease     Diabetes mellitus type I     Hyperlipidemia     Hypertension        PAST SURGICAL HISTORY:  Past Surgical History:   Procedure Laterality Date    ADENOIDECTOMY      CATARACT EXTRACTION, BILATERAL      CORONARY STENT PLACEMENT  08/21/2016    dr. ruiz       SOCIAL HISTORY:  Social History     Socioeconomic History    Marital status:      Spouse name: Not on file    Number of children: Not on file    Years of education: Not on file    Highest education level: Not on file   Social Needs    Financial resource strain: Not on file    Food insecurity - worry: Not on file    Food insecurity - inability: Not on file    Transportation needs - medical: Not on file    Transportation needs - non-medical: Not on file   Occupational History    Not on file   Tobacco Use    Smoking status: Never Smoker    Smokeless tobacco: Never Used   Substance and Sexual Activity    Alcohol use: Not on file    Drug use: Not on file    Sexual activity: Not on file   Other Topics Concern    Not on file   Social History Narrative    Not on file       FAMILY HISTORY:  History reviewed. No pertinent family  history.    ALLERGIES AND MEDICATIONS: updated and reviewed.  Review of patient's allergies indicates:   Allergen Reactions    Amoxicillin     Bactrim [sulfamethoxazole-trimethoprim]      Current Outpatient Medications   Medication Sig Dispense Refill    ascorbic acid, vitamin C, (VITAMIN C) 100 MG tablet Take 500 mg by mouth once daily.      aspirin (ECOTRIN) 81 MG EC tablet Take 81 mg by mouth once daily.      atorvastatin (LIPITOR) 40 MG tablet TAKE 1 TABLET EVERY DAY 90 tablet 1    blood sugar diagnostic Strp Check  each 3    clopidogrel (PLAVIX) 75 mg tablet TAKE 1 TABLET EVERY DAY 90 tablet 0    enalapril (VASOTEC) 20 MG tablet TAKE 1 TABLET EVERY DAY 90 tablet 1    glipiZIDE (GLUCOTROL) 2.5 MG TR24 Take 1 tablet (2.5 mg total) by mouth daily with breakfast. 30 tablet 1    ibuprofen (ADVIL,MOTRIN) 400 MG tablet       isosorbide mononitrate (IMDUR) 60 MG 24 hr tablet TAKE 1 TABLET EVERY DAY 90 tablet 0    metFORMIN (GLUCOPHAGE) 500 MG tablet Take 2 tablets (1,000 mg total) by mouth 2 (two) times daily with meals. 2 am and 1 pm 90 tablet 3    metoprolol succinate (TOPROL-XL) 25 MG 24 hr tablet       metoprolol tartrate (LOPRESSOR) 25 MG tablet Take 1 tablet (25 mg total) by mouth Daily. 90 tablet 3    nitroGLYCERIN (NITROSTAT) 0.3 MG SL tablet Place 1 tablet (0.3 mg total) under the tongue every 5 (five) minutes as needed for Chest pain. 25 tablet 1    senna-docusate 8.6-50 mg (PERICOLACE) 8.6-50 mg per tablet Take 1 tablet by mouth once daily.       No current facility-administered medications for this visit.        ROS  Review of Systems   Constitutional: Negative for chills and fever.   HENT: Negative for hearing loss and sore throat.    Eyes: Negative for visual disturbance.   Respiratory: Negative for cough and shortness of breath.    Cardiovascular: Negative for chest pain, palpitations and leg swelling.   Gastrointestinal: Negative for abdominal pain, constipation, diarrhea, nausea  "and vomiting.   Genitourinary: Negative for dysuria, frequency and urgency.   Musculoskeletal: Positive for arthralgias (L 3rd digit pain). Negative for joint swelling and myalgias.   Skin: Negative for rash and wound.   Neurological: Negative for headaches.   Psychiatric/Behavioral: Negative for agitation and confusion. The patient is not nervous/anxious.          OBJECTIVE     Physical Exam  Vitals:    08/13/18 1328   BP: 130/82   Pulse: 70   Resp: 18   Temp: 97.8 °F (36.6 °C)    Body mass index is 27.7 kg/m².  Weight: 68.7 kg (151 lb 7.3 oz)   Height: 5' 2" (157.5 cm)     Physical Exam   Constitutional: She is oriented to person, place, and time. She appears well-developed and well-nourished. No distress.   HENT:   Head: Normocephalic and atraumatic.   Right Ear: External ear normal.   Left Ear: External ear normal.   Nose: Nose normal.   Mouth/Throat: Oropharynx is clear and moist.   Eyes: Conjunctivae and EOM are normal. Right eye exhibits no discharge. Left eye exhibits no discharge. No scleral icterus.   Neck: Normal range of motion. Neck supple. No JVD present. No tracheal deviation present.   Cardiovascular: Normal rate, regular rhythm and intact distal pulses. Exam reveals no gallop and no friction rub.   No murmur heard.  Pulmonary/Chest: Effort normal and breath sounds normal. No respiratory distress. She has no wheezes.   Abdominal: Soft. Bowel sounds are normal. She exhibits no distension and no mass. There is no tenderness. There is no rebound and no guarding.   Musculoskeletal: Normal range of motion. She exhibits no edema, tenderness or deformity.   Heberden's and Mervin nodes present on digits of L hand   Neurological: She is alert and oriented to person, place, and time. She exhibits normal muscle tone. Coordination normal.   Skin: Skin is warm and dry. No rash noted. No erythema.   Psychiatric: She has a normal mood and affect. Her behavior is normal. Judgment and thought content normal. "         Health Maintenance       Date Due Completion Date    TETANUS VACCINE 09/07/2015 9/7/2005    Hemoglobin A1c 07/09/2018 1/9/2018    Influenza Vaccine 08/01/2018 12/12/2017    Pneumococcal (65+) (2 of 2 - PPSV23) 12/12/2018 12/12/2017    Foot Exam 12/12/2018 12/12/2017    Override on 6/12/2017: Done    Lipid Panel 01/09/2019 1/9/2018    Eye Exam 02/01/2019 2/1/2018 (Done)    Override on 2/1/2018: Done ([facility])    Override on 2/6/2017: Done (Dr. Lindsay)            ASSESSMENT     89 y.o. female with     1. Pain of left middle finger    2. Type 2 diabetes mellitus with stage 3 chronic kidney disease, without long-term current use of insulin    3. Bilateral temporomandibular joint pain        PLAN:     1. Pain of left middle finger  - Pt encouraged to apply ice packs 2-3 times daily at 10 minute intervals x 72 hours, then okay to change to heating compress with care not to burn her self; she  voiced understanding   - Pt to take OTC Tylenol prn pain  - X-Ray Finger 2 or More Views; Future  - Sedimentation rate; Future  - C-reactive protein; Future  - Rheumatoid factor; Future  - Uric acid; Future  - CYCLIC CITRUL PEPTIDE ANTIBODY, IGG; Future    2. Type 2 diabetes mellitus with stage 3 chronic kidney disease, without long-term current use of insulin  - Stable; no acute issues  - The current medical regimen is effective;  continue present plan and medications.  - CBC auto differential; Future  - Comprehensive metabolic panel; Future  - Hemoglobin A1c; Future    3. Bilateral temporomandibular joint pain  - Stable; no acute issues  - Pt will call back with name of ENT she wishes to be referred to for further eval      RTC in 1-2 weeks as needed for any acute worsening of current condition or failure to improve       Gretchen Pena MD  08/13/2018 1:39 PM        No Follow-up on file.

## 2018-08-14 DIAGNOSIS — M10.9 ACUTE GOUT OF LEFT HAND, UNSPECIFIED CAUSE: Primary | ICD-10-CM

## 2018-08-14 LAB
CCP AB SER IA-ACNC: 1.9 U/ML
ESTIMATED AVG GLUCOSE: 146 MG/DL
HBA1C MFR BLD HPLC: 6.7 %
RHEUMATOID FACT SERPL-ACNC: <10 IU/ML

## 2018-08-14 RX ORDER — COLCHICINE 0.6 MG/1
TABLET ORAL
Qty: 3 TABLET | Refills: 0 | Status: SHIPPED | OUTPATIENT
Start: 2018-08-14 | End: 2021-04-12 | Stop reason: SDUPTHER

## 2018-08-14 NOTE — PROGRESS NOTES
Called pt and gave her results of recent labs and xray. All questions/concerns addressed and she voiced understanding.

## 2018-08-30 ENCOUNTER — OFFICE VISIT (OUTPATIENT)
Dept: VASCULAR SURGERY | Facility: CLINIC | Age: 83
End: 2018-08-30
Payer: MEDICARE

## 2018-08-30 VITALS
HEART RATE: 98 BPM | SYSTOLIC BLOOD PRESSURE: 142 MMHG | BODY MASS INDEX: 27.34 KG/M2 | HEIGHT: 62 IN | WEIGHT: 148.56 LBS | DIASTOLIC BLOOD PRESSURE: 60 MMHG

## 2018-08-30 DIAGNOSIS — I83.90 VARICOSE VEIN OF LEG: ICD-10-CM

## 2018-08-30 DIAGNOSIS — I87.2 VENOUS INSUFFICIENCY: Primary | ICD-10-CM

## 2018-08-30 PROCEDURE — 99999 PR PBB SHADOW E&M-EST. PATIENT-LVL III: CPT | Mod: PBBFAC,,, | Performed by: SURGERY

## 2018-08-30 PROCEDURE — 99214 OFFICE O/P EST MOD 30 MIN: CPT | Mod: S$GLB,,, | Performed by: SURGERY

## 2018-08-30 NOTE — PROGRESS NOTES
Robert Love MD [facility]                         08/30/2018    HPI:  Tonya Marquez is a 89 y.o. female with   Patient Active Problem List   Diagnosis    Old MI (myocardial infarction)    Venous insufficiency of both lower extremities    Pure hypercholesterolemia    Stable angina    Type 2 diabetes mellitus with diabetic polyneuropathy, without long-term current use of insulin    TMJ arthralgia    Chronic idiopathic constipation    Chronic kidney disease (CKD), stage III (moderate)    Coronary artery disease of native artery of native heart with stable angina pectoris    Diabetes mellitus type 2 with retinopathy    Type 2 diabetes mellitus with stage 3 chronic kidney disease    Acute gout of left hand   s/p MI 2016 requiring coronary stenting at [facility] being managed by PCP and specialists who is here today for evaluation of BLE varicose veins and edema.  She is s/p L GSV stripping 20-30 yrs ago and L SSV sclerotherapy 2015.  Cont to have c/o BLE pain.  Describes BLE pain nightly.  Patient states location is bilateral calf occurring for 2 years.  Associated signs and symptoms are none.  Quality is cramping and severity is 5/10.  Symptoms began 2 yrs ago after L SSV sclerotherapy.  Alleviating factors include exercise and pain medication.  Worsening factors include dependency.  Denies BLE edema, inflammatory symptoms and wounds.    yes MI  no Stroke  Tobacco use: never smoker    3/8/18: Wearing stockings, BLE pain improved.  Ambulating well.      Interval history:  Wearing stockings although heat does prohibit.  Cont to exercise and eat low Na diet.    Past Medical History:   Diagnosis Date    Coronary artery disease     Diabetes mellitus type I     Hyperlipidemia     Hypertension      Past Surgical History:   Procedure Laterality Date    ADENOIDECTOMY      CATARACT EXTRACTION, BILATERAL      CORONARY STENT PLACEMENT  08/21/2016      joseph     History reviewed. No pertinent family history.  Social History     Socioeconomic History    Marital status:      Spouse name: Not on file    Number of children: Not on file    Years of education: Not on file    Highest education level: Not on file   Social Needs    Financial resource strain: Not on file    Food insecurity - worry: Not on file    Food insecurity - inability: Not on file    Transportation needs - medical: Not on file    Transportation needs - non-medical: Not on file   Occupational History    Not on file   Tobacco Use    Smoking status: Never Smoker    Smokeless tobacco: Never Used   Substance and Sexual Activity    Alcohol use: Not on file    Drug use: Not on file    Sexual activity: Not on file   Other Topics Concern    Not on file   Social History Narrative    Not on file       Current Outpatient Medications:     ascorbic acid, vitamin C, (VITAMIN C) 100 MG tablet, Take 500 mg by mouth once daily., Disp: , Rfl:     aspirin (ECOTRIN) 81 MG EC tablet, Take 81 mg by mouth once daily., Disp: , Rfl:     atorvastatin (LIPITOR) 40 MG tablet, TAKE 1 TABLET EVERY DAY, Disp: 90 tablet, Rfl: 1    blood sugar diagnostic Strp, Check BID, Disp: 150 each, Rfl: 3    clopidogrel (PLAVIX) 75 mg tablet, TAKE 1 TABLET EVERY DAY, Disp: 90 tablet, Rfl: 0    colchicine 0.6 mg tablet, Take 2 tablets by mouth, then one hour later take 1 additional tablet, Disp: 3 tablet, Rfl: 0    enalapril (VASOTEC) 20 MG tablet, TAKE 1 TABLET EVERY DAY, Disp: 90 tablet, Rfl: 1    glipiZIDE (GLUCOTROL) 2.5 MG TR24, Take 1 tablet (2.5 mg total) by mouth daily with breakfast., Disp: 30 tablet, Rfl: 1    ibuprofen (ADVIL,MOTRIN) 400 MG tablet, , Disp: , Rfl:     isosorbide mononitrate (IMDUR) 60 MG 24 hr tablet, TAKE 1 TABLET EVERY DAY, Disp: 90 tablet, Rfl: 0    metFORMIN (GLUCOPHAGE) 500 MG tablet, Take 2 tablets (1,000 mg total) by mouth 2 (two) times daily with meals. 2 am and 1 pm, Disp:  90 tablet, Rfl: 3    metoprolol succinate (TOPROL-XL) 25 MG 24 hr tablet, , Disp: , Rfl:     metoprolol tartrate (LOPRESSOR) 25 MG tablet, Take 1 tablet (25 mg total) by mouth Daily., Disp: 90 tablet, Rfl: 3    nitroGLYCERIN (NITROSTAT) 0.3 MG SL tablet, Place 1 tablet (0.3 mg total) under the tongue every 5 (five) minutes as needed for Chest pain., Disp: 25 tablet, Rfl: 1    senna-docusate 8.6-50 mg (PERICOLACE) 8.6-50 mg per tablet, Take 1 tablet by mouth once daily., Disp: , Rfl:     REVIEW OF SYSTEMS:  General: No fevers or chills; ENT: No sore throat; Allergy and Immunology: no persistent infections; Hematological and Lymphatic: No history of bleeding or easy bruising; Endocrine: negative; Respiratory: no cough, shortness of breath, or wheezing; Cardiovascular: no chest pain or dyspnea on exertion; Gastrointestinal: no abdominal pain/back, change in bowel habits, or bloody stools; Genito-Urinary: no dysuria, trouble voiding, or hematuria; Musculoskeletal: negative; Neurological: no TIA or stroke symptoms; Psychiatric: no nervousness, anxiety or depression.    PHYSICAL EXAM:   Right Arm BP - Sittin/60 (18 0944)  Left Arm BP - Sittin/60 (18 0944)  Pulse: 98         General appearance:  Alert, well-appearing, and in no distress.  Oriented to person, place, and time                    Neurological: Normal speech, no focal findings noted; CN II - XII grossly intact. RLE with sensation to light touch, LLE with sensation to light touch.            Musculoskeletal: Digits/nail without cyanosis/clubbing.  Strength 5/5 BLE.                    Neck: Supple, no significant adenopathy, no carotid bruit can be auscultated                  Chest:  Clear to auscultation, no wheezes, rales or rhonchi, symmetric air entry. No use of accessory muscles               Cardiac: Normal rate and regular rhythm, S1 and S2 normal            Abdomen: Soft, nontender, nondistended, no masses or organomegaly, no  hernia     No rebound tenderness noted; bowel sounds normal     No groin adenopathy      Extremities:   2+ R femoral pulse, 2+ L femoral pulse     1+ R popliteal pulse, 1+ L popliteal pulse     1+ R PT pulse, 1+ L PT pulse     2+ R DP pulse, 2+ L DP pulse     no RLE edema, no LLE edema    Skin: RLE without ulcer; LLE without ulcer     Bilateral LE varicose veins, no thrombophlebitis, bilateral LE reticular veins, minimal edema BLE     CEAP RLE 3, 3    LAB RESULTS:  No results found for: CBC  Lab Results   Component Value Date    LABPROT 10.3 09/24/2010    INR 1.0 09/24/2010     Lab Results   Component Value Date     08/13/2018    K 4.7 08/13/2018     08/13/2018    CO2 25 08/13/2018     (H) 08/13/2018    BUN 28 (H) 08/13/2018    CREATININE 1.2 08/13/2018    CALCIUM 10.4 08/13/2018    ANIONGAP 7 (L) 08/13/2018    EGFRNONAA 40 (A) 08/13/2018     Lab Results   Component Value Date    WBC 6.74 08/13/2018    RBC 3.92 (L) 08/13/2018    HGB 11.7 (L) 08/13/2018    HCT 34.1 (L) 08/13/2018    MCV 87 08/13/2018    MCH 29.8 08/13/2018    MCHC 34.3 08/13/2018    RDW 13.0 08/13/2018     08/13/2018    MPV 11.2 08/13/2018    GRAN 4.0 08/13/2018    GRAN 59.3 08/13/2018    LYMPH 2.1 08/13/2018    LYMPH 30.4 08/13/2018    MONO 0.5 08/13/2018    MONO 7.7 08/13/2018    EOS 0.2 08/13/2018    BASO 0.01 08/13/2018    EOSINOPHIL 2.2 08/13/2018    BASOPHIL 0.1 08/13/2018    DIFFMETHOD Automated 08/13/2018     .  Lab Results   Component Value Date    HGBA1C 6.7 (H) 08/13/2018       IMAGING:  All pertinent imaging has been reviewed and interpreted independently.    No outside imaging available.    CORA 1/1    No DVT.  R GSV reflux.  L GSV stripped.  L SSV without reflux.    IMP/PLAN:  88 y.o. female with   Patient Active Problem List   Diagnosis    Old MI (myocardial infarction)    PAD (peripheral artery disease)    Pure hypercholesterolemia    Stable angina    Type 2 diabetes mellitus, without long-term current  use of insulin    TMJ arthralgia    Chronic idiopathic constipation    Chronic kidney disease (CKD), stage III (moderate)   s/p MI 2016 requiring coronary stenting at [facility] being managed by PCP and specialists who is here today for evaluation of BLE varicose veins and edema.    -R GSV reflux at mid thigh and below knee, no LLE reflux, BLE varicose veins with resolution of pain and edema with medical therapy, no skin changes- recommend cont compression with Rx stockings, elevation, dietary changes associated with water and sodium intake discussed at length with patient  -No surgical intervention indicated at this time  -Will see pt back in 1 year for further evaluation    I spent 30 minutes evaluating this patient and greater than 50% of the time was spent counseling, coordinator care and discussing the plan of care.  All questions were answered and patient stated understanding with agreement with the above treatment plan.    Robert Love MD [facility]  Vascular and Endovascular Surgery

## 2018-08-30 NOTE — PATIENT INSTRUCTIONS
Understanding Chronic Venous Insufficiency  Problems with the veins in the legs may lead to chronic venous insufficiency (CVI). CVI means that there is a long-term problem with the veins not being able to pump blood back to your heart. When this happens, blood stays in the legs and causes swelling and aching.   Two problems that may lead to chronic venous insufficiency are:  · Damaged valves. Valves keep blood flowing from the legs through the blood vessels and back to the heart. When the valves are damaged, blood does not flow as well.   · Deep vein thrombosis (DVT). Blood clots may form in the deep veins of the legs. This may cause pain, redness, and swelling in the legs. It may also block the flow of blood back to the heart. Seek immediate medical care if you have these symptoms.  · A blood clot in the leg can also break off and travel to the lungs. This is called pulmonary embolism (PE). In the lungs, the clot can cut off the flow of blood. This may cause chest pain, trouble breathing, sweating, a fast heartbeat, coughing (may cough up blood), and fainting. It is a medical emergency and may cause death. Call 911 if you have these symptoms.  · Healthcare providers call the two conditions, DVT and PE, venous thromboembolism (VTE).  CVI cant be cured, but you can control leg swelling to reduce the likelihood of ulcers (sores).  Recognizing the symptoms  Be aware of the following:  · If you stand or sit with your feet down for long periods, your legs may ache or feel heavy.  · Swollen ankles are possibly the most common symptom of CVI.  · As swelling increases, the skin over your ankles may show red spots or a brownish tinge. The skin may feel leathery or scaly, and may start to itch.  · If swelling is not controlled, an ulcer (open wound) may form.  What you can do  Reduce your risk of developing ulcers by doing the following:  · Increase blood flow back to your heart by elevating your legs, exercising daily,  and wearing elastic stockings.  · Boost blood flow in your legs by losing excess weight.  · If you must stand or sit in one place for a period of time, keep your blood moving by wiggling your toes, shifting your body position, and rising up on the balls of your feet.    Date Last Reviewed: 5/1/2016  © 4781-6641 [employer]. [address] [place] [address], [place] [address]. All rights reserved. This information is not intended as a substitute for professional medical care. Always follow your healthcare professional's instructions.

## 2018-09-05 ENCOUNTER — OFFICE VISIT (OUTPATIENT)
Dept: FAMILY MEDICINE | Facility: CLINIC | Age: 83
End: 2018-09-05
Payer: MEDICARE

## 2018-09-05 ENCOUNTER — TELEPHONE (OUTPATIENT)
Dept: FAMILY MEDICINE | Facility: CLINIC | Age: 83
End: 2018-09-05

## 2018-09-05 VITALS
HEART RATE: 80 BPM | BODY MASS INDEX: 27.47 KG/M2 | WEIGHT: 149.25 LBS | HEIGHT: 62 IN | DIASTOLIC BLOOD PRESSURE: 70 MMHG | TEMPERATURE: 98 F | OXYGEN SATURATION: 97 % | SYSTOLIC BLOOD PRESSURE: 120 MMHG

## 2018-09-05 DIAGNOSIS — J06.9 UPPER RESPIRATORY TRACT INFECTION, UNSPECIFIED TYPE: Primary | ICD-10-CM

## 2018-09-05 DIAGNOSIS — J06.9 UPPER RESPIRATORY TRACT INFECTION, UNSPECIFIED TYPE: ICD-10-CM

## 2018-09-05 PROCEDURE — 99213 OFFICE O/P EST LOW 20 MIN: CPT | Mod: S$PBB,,, | Performed by: PHYSICIAN ASSISTANT

## 2018-09-05 PROCEDURE — 1101F PT FALLS ASSESS-DOCD LE1/YR: CPT | Mod: CPTII,,, | Performed by: PHYSICIAN ASSISTANT

## 2018-09-05 PROCEDURE — 99214 OFFICE O/P EST MOD 30 MIN: CPT | Mod: PBBFAC,PO | Performed by: PHYSICIAN ASSISTANT

## 2018-09-05 PROCEDURE — 99999 PR PBB SHADOW E&M-EST. PATIENT-LVL IV: CPT | Mod: PBBFAC,,, | Performed by: PHYSICIAN ASSISTANT

## 2018-09-05 RX ORDER — AZELASTINE 1 MG/ML
1 SPRAY, METERED NASAL 2 TIMES DAILY
Qty: 30 ML | Refills: 0 | Status: SHIPPED | OUTPATIENT
Start: 2018-09-05 | End: 2020-06-01

## 2018-09-05 RX ORDER — LEVOCETIRIZINE DIHYDROCHLORIDE 5 MG/1
2.5 TABLET, FILM COATED ORAL EVERY OTHER DAY
Qty: 8 TABLET | Refills: 1 | Status: SHIPPED | OUTPATIENT
Start: 2018-09-05 | End: 2018-09-05 | Stop reason: SDUPTHER

## 2018-09-05 NOTE — PROGRESS NOTES
Subjective:       Patient ID: Tonya Marquez is a 89 y.o. female with multiple medical diagnoses as listed in the medical history and problem list that presents for Nasal Congestion and Headache  .    Chief Complaint: Nasal Congestion and Headache      URI    This is a new problem. The current episode started in the past 7 days (4 days). Associated symptoms include congestion, coughing (dry very little ), ear pain, headaches, rhinorrhea and sneezing. Pertinent negatives include no abdominal pain, diarrhea, nausea, sinus pain, sore throat, vomiting or wheezing. Treatments tried: OTC cold medicine tylenol and eye drops,      Review of Systems   Constitutional: Negative for chills, fatigue and fever.   HENT: Positive for congestion, ear pain, postnasal drip, rhinorrhea and sneezing. Negative for sinus pressure, sinus pain, sore throat and trouble swallowing.    Eyes: Positive for pain and discharge (watery). Negative for photophobia, redness and itching.   Respiratory: Positive for cough (dry very little ). Negative for chest tightness, shortness of breath and wheezing.    Gastrointestinal: Negative for abdominal pain, diarrhea, nausea and vomiting.   Neurological: Positive for dizziness and headaches.         PAST MEDICAL HISTORY:  Past Medical History:   Diagnosis Date    Coronary artery disease     Diabetes mellitus type I     Hyperlipidemia     Hypertension        SOCIAL HISTORY:  Social History     Socioeconomic History    Marital status:      Spouse name: Not on file    Number of children: Not on file    Years of education: Not on file    Highest education level: Not on file   Social Needs    Financial resource strain: Not on file    Food insecurity - worry: Not on file    Food insecurity - inability: Not on file    Transportation needs - medical: Not on file    Transportation needs - non-medical: Not on file   Occupational History    Not on file   Tobacco Use    Smoking status: Never  Smoker    Smokeless tobacco: Never Used   Substance and Sexual Activity    Alcohol use: Not on file    Drug use: Not on file    Sexual activity: Not on file   Other Topics Concern    Not on file   Social History Narrative    Not on file       ALLERGIES AND MEDICATIONS: updated and reviewed.  Review of patient's allergies indicates:   Allergen Reactions    Amoxicillin     Bactrim [sulfamethoxazole-trimethoprim]      Current Outpatient Medications   Medication Sig Dispense Refill    ascorbic acid, vitamin C, (VITAMIN C) 100 MG tablet Take 500 mg by mouth once daily.      aspirin (ECOTRIN) 81 MG EC tablet Take 81 mg by mouth once daily.      atorvastatin (LIPITOR) 40 MG tablet TAKE 1 TABLET EVERY DAY 90 tablet 1    blood sugar diagnostic Strp Check  each 3    clopidogrel (PLAVIX) 75 mg tablet TAKE 1 TABLET EVERY DAY 90 tablet 0    colchicine 0.6 mg tablet Take 2 tablets by mouth, then one hour later take 1 additional tablet 3 tablet 0    enalapril (VASOTEC) 20 MG tablet TAKE 1 TABLET EVERY DAY 90 tablet 1    glipiZIDE (GLUCOTROL) 2.5 MG TR24 Take 1 tablet (2.5 mg total) by mouth daily with breakfast. 30 tablet 1    ibuprofen (ADVIL,MOTRIN) 400 MG tablet       isosorbide mononitrate (IMDUR) 60 MG 24 hr tablet TAKE 1 TABLET EVERY DAY 90 tablet 0    metFORMIN (GLUCOPHAGE) 500 MG tablet Take 2 tablets (1,000 mg total) by mouth 2 (two) times daily with meals. 2 am and 1 pm 90 tablet 3    metoprolol succinate (TOPROL-XL) 25 MG 24 hr tablet       metoprolol tartrate (LOPRESSOR) 25 MG tablet Take 1 tablet (25 mg total) by mouth Daily. 90 tablet 3    nitroGLYCERIN (NITROSTAT) 0.3 MG SL tablet Place 1 tablet (0.3 mg total) under the tongue every 5 (five) minutes as needed for Chest pain. 25 tablet 1    senna-docusate 8.6-50 mg (PERICOLACE) 8.6-50 mg per tablet Take 1 tablet by mouth once daily.      azelastine (ASTELIN) 137 mcg (0.1 %) nasal spray 1 spray (137 mcg total) by Nasal route 2 (two)  "times daily. 30 mL 0    levocetirizine (XYZAL) 5 MG tablet Take 0.5 tablets (2.5 mg total) by mouth every other day. 8 tablet 1     No current facility-administered medications for this visit.          Objective:   /70   Pulse 80   Temp 98.2 °F (36.8 °C) (Oral)   Ht 5' 2" (1.575 m)   Wt 67.7 kg (149 lb 4 oz)   SpO2 97%   BMI 27.30 kg/m²      Physical Exam   Constitutional: She is oriented to person, place, and time. No distress.   HENT:   Head: Normocephalic and atraumatic.   Right Ear: External ear and ear canal normal. Tympanic membrane is injected.   Left Ear: External ear and ear canal normal. Tympanic membrane is injected.   Nose: Mucosal edema and rhinorrhea present. Right sinus exhibits no maxillary sinus tenderness and no frontal sinus tenderness. Left sinus exhibits no maxillary sinus tenderness and no frontal sinus tenderness.   Mouth/Throat: Uvula is midline. Posterior oropharyngeal erythema (PND) present. No oropharyngeal exudate. No tonsillar exudate.   Air fluid levels    Eyes: Conjunctivae and EOM are normal.   Cardiovascular: Normal rate and regular rhythm.   Pulmonary/Chest: Effort normal and breath sounds normal.   Musculoskeletal: Normal range of motion.   Lymphadenopathy:     She has no cervical adenopathy.   Neurological: She is alert and oriented to person, place, and time.   Skin: Skin is warm. No erythema.   Psychiatric: She has a normal mood and affect. Her behavior is normal.           Assessment:       1. Upper respiratory tract infection, unspecified type        Plan:       Upper respiratory tract infection, unspecified type  -     azelastine (ASTELIN) 137 mcg (0.1 %) nasal spray; 1 spray (137 mcg total) by Nasal route 2 (two) times daily.  Dispense: 30 mL; Refill: 0  -     levocetirizine (XYZAL) 5 MG tablet; Take 0.5 tablets (2.5 mg total) by mouth every other day.  Dispense: 8 tablet; Refill: 1    Flonase 1 spray twice a day head down to the ground after blowing nose  Call " for fever 100.4 or higher, change in discharge color, no improvement in 7-10 days.             No Follow-up on file.

## 2018-09-05 NOTE — TELEPHONE ENCOUNTER
----- Message from Corby Zapata sent at 9/5/2018  9:20 AM CDT -----  Contact: self  Pt is requesting a call from the nurse to explain symptoms she experiencing. Offered appointment pt refused. States she saw Dr. Pena 8.13.18. She would like prescription called in for symptoms. Contact [phone].    Thanks-

## 2018-09-06 DIAGNOSIS — E11.22 TYPE 2 DIABETES MELLITUS WITH STAGE 3 CHRONIC KIDNEY DISEASE, WITHOUT LONG-TERM CURRENT USE OF INSULIN: ICD-10-CM

## 2018-09-06 DIAGNOSIS — I20.89 STABLE ANGINA: ICD-10-CM

## 2018-09-06 DIAGNOSIS — N18.30 TYPE 2 DIABETES MELLITUS WITH STAGE 3 CHRONIC KIDNEY DISEASE, WITHOUT LONG-TERM CURRENT USE OF INSULIN: ICD-10-CM

## 2018-09-06 DIAGNOSIS — E11.51 TYPE 2 DIABETES MELLITUS WITH DIABETIC PERIPHERAL ANGIOPATHY WITHOUT GANGRENE, WITHOUT LONG-TERM CURRENT USE OF INSULIN: ICD-10-CM

## 2018-09-06 RX ORDER — METFORMIN HYDROCHLORIDE 500 MG/1
1000 TABLET ORAL 2 TIMES DAILY WITH MEALS
Qty: 90 TABLET | Refills: 1 | Status: SHIPPED | OUTPATIENT
Start: 2018-09-06 | End: 2018-09-12

## 2018-09-06 RX ORDER — ENALAPRIL MALEATE 20 MG/1
20 TABLET ORAL DAILY
Qty: 90 TABLET | Refills: 1 | Status: SHIPPED | OUTPATIENT
Start: 2018-09-06 | End: 2019-04-22 | Stop reason: SDUPTHER

## 2018-09-06 RX ORDER — ISOSORBIDE MONONITRATE 60 MG/1
60 TABLET, EXTENDED RELEASE ORAL DAILY
Qty: 90 TABLET | Refills: 1 | Status: SHIPPED | OUTPATIENT
Start: 2018-09-06 | End: 2019-03-14 | Stop reason: SDUPTHER

## 2018-09-06 RX ORDER — LEVOCETIRIZINE DIHYDROCHLORIDE 5 MG/1
TABLET, FILM COATED ORAL
Qty: 22 TABLET | Refills: 1 | Status: SHIPPED | OUTPATIENT
Start: 2018-09-06 | End: 2020-08-07 | Stop reason: SDUPTHER

## 2018-09-06 RX ORDER — ATORVASTATIN CALCIUM 40 MG/1
40 TABLET, FILM COATED ORAL DAILY
Qty: 90 TABLET | Refills: 1 | Status: SHIPPED | OUTPATIENT
Start: 2018-09-06 | End: 2019-03-25 | Stop reason: SDUPTHER

## 2018-09-06 RX ORDER — METOPROLOL SUCCINATE 25 MG/1
25 TABLET, EXTENDED RELEASE ORAL DAILY
Qty: 90 TABLET | Refills: 1 | Status: SHIPPED | OUTPATIENT
Start: 2018-09-06 | End: 2019-03-14 | Stop reason: SDUPTHER

## 2018-09-06 RX ORDER — CLOPIDOGREL BISULFATE 75 MG/1
75 TABLET ORAL DAILY
Qty: 90 TABLET | Refills: 1 | Status: SHIPPED | OUTPATIENT
Start: 2018-09-06 | End: 2019-02-19 | Stop reason: SDUPTHER

## 2018-09-06 RX ORDER — GLIPIZIDE 2.5 MG/1
2.5 TABLET, EXTENDED RELEASE ORAL
Qty: 90 TABLET | Refills: 1 | Status: SHIPPED | OUTPATIENT
Start: 2018-09-06 | End: 2019-01-15 | Stop reason: SDUPTHER

## 2018-09-12 ENCOUNTER — TELEPHONE (OUTPATIENT)
Dept: FAMILY MEDICINE | Facility: CLINIC | Age: 83
End: 2018-09-12

## 2018-09-12 NOTE — TELEPHONE ENCOUNTER
Patient contacted and is upset that she has both Glipizide and Metformin ordered when she wasn't suppose to be on the Metformin; spoke with Dr. Kuo and he stated to discontinue the Metformin. Joan notified.

## 2018-09-12 NOTE — TELEPHONE ENCOUNTER
----- Message from Ila Estrada sent at 9/11/2018 11:56 AM CDT -----  Contact: Self  Pt is calling to speak with staff regarding medication. Please call pt at [phone].

## 2018-09-12 NOTE — TELEPHONE ENCOUNTER
Called for patient/ no answer/ Any idea what patient is talking about?/ last visit 9/5/2018 Michael Pacheco

## 2018-09-12 NOTE — TELEPHONE ENCOUNTER
----- Message from Maggy Leigh sent at 9/12/2018 11:23 AM CDT -----  Contact: self  Pt having trouble with meds say doc ordered wrong one,shes really upset. Please call asap [phone]

## 2019-01-15 DIAGNOSIS — N18.30 TYPE 2 DIABETES MELLITUS WITH STAGE 3 CHRONIC KIDNEY DISEASE, WITHOUT LONG-TERM CURRENT USE OF INSULIN: ICD-10-CM

## 2019-01-15 DIAGNOSIS — E11.22 TYPE 2 DIABETES MELLITUS WITH STAGE 3 CHRONIC KIDNEY DISEASE, WITHOUT LONG-TERM CURRENT USE OF INSULIN: ICD-10-CM

## 2019-01-15 RX ORDER — GLIPIZIDE 2.5 MG/1
TABLET, EXTENDED RELEASE ORAL
Qty: 90 TABLET | Refills: 1 | Status: SHIPPED | OUTPATIENT
Start: 2019-01-15 | End: 2019-06-10 | Stop reason: SDUPTHER

## 2019-02-19 DIAGNOSIS — I20.89 STABLE ANGINA: ICD-10-CM

## 2019-02-19 RX ORDER — CLOPIDOGREL BISULFATE 75 MG/1
TABLET ORAL
Qty: 90 TABLET | Refills: 0 | Status: SHIPPED | OUTPATIENT
Start: 2019-02-19 | End: 2019-04-22 | Stop reason: SDUPTHER

## 2019-03-14 DIAGNOSIS — I20.89 STABLE ANGINA: ICD-10-CM

## 2019-03-14 DIAGNOSIS — E11.42 TYPE 2 DIABETES MELLITUS WITH DIABETIC POLYNEUROPATHY, WITHOUT LONG-TERM CURRENT USE OF INSULIN: Primary | ICD-10-CM

## 2019-03-14 RX ORDER — ISOSORBIDE MONONITRATE 60 MG/1
TABLET, EXTENDED RELEASE ORAL
Qty: 90 TABLET | Refills: 1 | Status: SHIPPED | OUTPATIENT
Start: 2019-03-14 | End: 2019-08-23 | Stop reason: SDUPTHER

## 2019-03-14 RX ORDER — METOPROLOL SUCCINATE 25 MG/1
TABLET, EXTENDED RELEASE ORAL
Qty: 90 TABLET | Refills: 1 | Status: SHIPPED | OUTPATIENT
Start: 2019-03-14 | End: 2019-10-08 | Stop reason: SDUPTHER

## 2019-03-14 NOTE — TELEPHONE ENCOUNTER
Refill approved.  Pt needs appt and labs; labs ordered; Pt may get them prior to appt so we can go over them during visit and adjust medication based on results.

## 2019-03-25 ENCOUNTER — LAB VISIT (OUTPATIENT)
Dept: LAB | Facility: HOSPITAL | Age: 84
End: 2019-03-25
Attending: INTERNAL MEDICINE
Payer: MEDICARE

## 2019-03-25 ENCOUNTER — OFFICE VISIT (OUTPATIENT)
Dept: FAMILY MEDICINE | Facility: CLINIC | Age: 84
End: 2019-03-25
Payer: MEDICARE

## 2019-03-25 VITALS
DIASTOLIC BLOOD PRESSURE: 52 MMHG | HEIGHT: 62 IN | WEIGHT: 150.56 LBS | OXYGEN SATURATION: 98 % | RESPIRATION RATE: 18 BRPM | SYSTOLIC BLOOD PRESSURE: 120 MMHG | BODY MASS INDEX: 27.7 KG/M2 | TEMPERATURE: 97 F | HEART RATE: 73 BPM

## 2019-03-25 DIAGNOSIS — Z00.00 ANNUAL PHYSICAL EXAM: Primary | ICD-10-CM

## 2019-03-25 DIAGNOSIS — G47.00 INSOMNIA, UNSPECIFIED TYPE: ICD-10-CM

## 2019-03-25 DIAGNOSIS — N18.30 TYPE 2 DIABETES MELLITUS WITH STAGE 3 CHRONIC KIDNEY DISEASE, WITHOUT LONG-TERM CURRENT USE OF INSULIN: ICD-10-CM

## 2019-03-25 DIAGNOSIS — E11.22 TYPE 2 DIABETES MELLITUS WITH STAGE 3 CHRONIC KIDNEY DISEASE, WITHOUT LONG-TERM CURRENT USE OF INSULIN: ICD-10-CM

## 2019-03-25 DIAGNOSIS — E11.42 TYPE 2 DIABETES MELLITUS WITH DIABETIC POLYNEUROPATHY, WITHOUT LONG-TERM CURRENT USE OF INSULIN: ICD-10-CM

## 2019-03-25 DIAGNOSIS — I20.89 STABLE ANGINA: ICD-10-CM

## 2019-03-25 DIAGNOSIS — R42 DIZZINESS: ICD-10-CM

## 2019-03-25 LAB
ALBUMIN SERPL BCP-MCNC: 4.1 G/DL (ref 3.5–5.2)
ALP SERPL-CCNC: 93 U/L (ref 55–135)
ALT SERPL W/O P-5'-P-CCNC: 19 U/L (ref 10–44)
ANION GAP SERPL CALC-SCNC: 6 MMOL/L (ref 8–16)
AST SERPL-CCNC: 18 U/L (ref 10–40)
BASOPHILS # BLD AUTO: 0.01 K/UL (ref 0–0.2)
BASOPHILS NFR BLD: 0.1 % (ref 0–1.9)
BILIRUB SERPL-MCNC: 0.4 MG/DL (ref 0.1–1)
BUN SERPL-MCNC: 29 MG/DL (ref 8–23)
CALCIUM SERPL-MCNC: 10.4 MG/DL (ref 8.7–10.5)
CHLORIDE SERPL-SCNC: 104 MMOL/L (ref 95–110)
CHOLEST SERPL-MCNC: 121 MG/DL (ref 120–199)
CHOLEST/HDLC SERPL: 2.4 {RATIO} (ref 2–5)
CO2 SERPL-SCNC: 25 MMOL/L (ref 23–29)
CREAT SERPL-MCNC: 1.2 MG/DL (ref 0.5–1.4)
DIFFERENTIAL METHOD: NORMAL
EOSINOPHIL # BLD AUTO: 0.1 K/UL (ref 0–0.5)
EOSINOPHIL NFR BLD: 1.6 % (ref 0–8)
ERYTHROCYTE [DISTWIDTH] IN BLOOD BY AUTOMATED COUNT: 13.5 % (ref 11.5–14.5)
EST. GFR  (AFRICAN AMERICAN): 46 ML/MIN/1.73 M^2
EST. GFR  (NON AFRICAN AMERICAN): 40 ML/MIN/1.73 M^2
ESTIMATED AVG GLUCOSE: 154 MG/DL (ref 68–131)
GLUCOSE SERPL-MCNC: 178 MG/DL (ref 70–110)
HBA1C MFR BLD HPLC: 7 % (ref 4–5.6)
HCT VFR BLD AUTO: 37.4 % (ref 37–48.5)
HDLC SERPL-MCNC: 50 MG/DL (ref 40–75)
HDLC SERPL: 41.3 % (ref 20–50)
HGB BLD-MCNC: 12.2 G/DL (ref 12–16)
LDLC SERPL CALC-MCNC: 37.2 MG/DL (ref 63–159)
LYMPHOCYTES # BLD AUTO: 1.7 K/UL (ref 1–4.8)
LYMPHOCYTES NFR BLD: 20.1 % (ref 18–48)
MCH RBC QN AUTO: 29.4 PG (ref 27–31)
MCHC RBC AUTO-ENTMCNC: 32.6 G/DL (ref 32–36)
MCV RBC AUTO: 90 FL (ref 82–98)
MONOCYTES # BLD AUTO: 0.6 K/UL (ref 0.3–1)
MONOCYTES NFR BLD: 7.6 % (ref 4–15)
NEUTROPHILS # BLD AUTO: 5.9 K/UL (ref 1.8–7.7)
NEUTROPHILS NFR BLD: 70.4 % (ref 38–73)
NONHDLC SERPL-MCNC: 71 MG/DL
PLATELET # BLD AUTO: 209 K/UL (ref 150–350)
PMV BLD AUTO: 11.4 FL (ref 9.2–12.9)
POTASSIUM SERPL-SCNC: 4.8 MMOL/L (ref 3.5–5.1)
PROT SERPL-MCNC: 7.1 G/DL (ref 6–8.4)
RBC # BLD AUTO: 4.15 M/UL (ref 4–5.4)
SODIUM SERPL-SCNC: 135 MMOL/L (ref 136–145)
TRIGL SERPL-MCNC: 169 MG/DL (ref 30–150)
WBC # BLD AUTO: 8.32 K/UL (ref 3.9–12.7)

## 2019-03-25 PROCEDURE — 83036 HEMOGLOBIN GLYCOSYLATED A1C: CPT | Mod: HCNC

## 2019-03-25 PROCEDURE — 99999 PR PBB SHADOW E&M-EST. PATIENT-LVL III: CPT | Mod: PBBFAC,HCNC,, | Performed by: INTERNAL MEDICINE

## 2019-03-25 PROCEDURE — 85025 COMPLETE CBC W/AUTO DIFF WBC: CPT | Mod: HCNC

## 2019-03-25 PROCEDURE — 99397 PR PREVENTIVE VISIT,EST,65 & OVER: ICD-10-PCS | Mod: HCNC,S$GLB,, | Performed by: INTERNAL MEDICINE

## 2019-03-25 PROCEDURE — 80061 LIPID PANEL: CPT | Mod: HCNC

## 2019-03-25 PROCEDURE — 80053 COMPREHEN METABOLIC PANEL: CPT | Mod: HCNC

## 2019-03-25 PROCEDURE — 99397 PER PM REEVAL EST PAT 65+ YR: CPT | Mod: HCNC,S$GLB,, | Performed by: INTERNAL MEDICINE

## 2019-03-25 PROCEDURE — 36415 COLL VENOUS BLD VENIPUNCTURE: CPT | Mod: HCNC,PO

## 2019-03-25 PROCEDURE — 99999 PR PBB SHADOW E&M-EST. PATIENT-LVL III: ICD-10-PCS | Mod: PBBFAC,HCNC,, | Performed by: INTERNAL MEDICINE

## 2019-03-25 RX ORDER — ATORVASTATIN CALCIUM 40 MG/1
40 TABLET, FILM COATED ORAL DAILY
Qty: 90 TABLET | Refills: 3 | Status: SHIPPED | OUTPATIENT
Start: 2019-03-25 | End: 2020-05-18

## 2019-03-25 NOTE — PROGRESS NOTES
SUBJECTIVE     Chief Complaint   Patient presents with    Follow-up     DM       HPI  Tonya Marquez is a 89 y.o. female with multiple medical diagnoses as listed in the medical history and problem list that presents for annual exam. Pt has been doing well since her last visit. She has a good appetite and eats well. She does exercise by going to the gym 2-3 times weekly. She sleeps for ~5 hours nightly. Pt does take OTC supplements, which is Vit C and Vit D. She does have any current stressors as she is helping a friend on hard times. Pt visits with friends and takes the dog for a walk to help deal with her stress. Pt is UTD on age appropriate [place] screening. She is fully compliant with meds and denies any adverse side effects.     PAST MEDICAL HISTORY:  Past Medical History:   Diagnosis Date    Coronary artery disease     Diabetes mellitus type I     Hyperlipidemia     Hypertension        PAST SURGICAL HISTORY:  Past Surgical History:   Procedure Laterality Date    ADENOIDECTOMY      CATARACT EXTRACTION, BILATERAL      CORONARY STENT PLACEMENT  08/21/2016    dr. ruiz       SOCIAL HISTORY:  Social History     Socioeconomic History    Marital status:      Spouse name: Not on file    Number of children: Not on file    Years of education: Not on file    Highest education level: Not on file   Occupational History    Not on file   Social Needs    Financial resource strain: Not on file    Food insecurity:     Worry: Not on file     Inability: Not on file    Transportation needs:     Medical: Not on file     Non-medical: Not on file   Tobacco Use    Smoking status: Never Smoker    Smokeless tobacco: Never Used   Substance and Sexual Activity    Alcohol use: Not on file    Drug use: Not on file    Sexual activity: Not on file   Lifestyle    Physical activity:     Days per week: Not on file     Minutes per session: Not on file    Stress: Not on file   Relationships    Social connections:      Talks on phone: Not on file     Gets together: Not on file     Attends Yazdanism service: Not on file     Active member of club or organization: Not on file     Attends meetings of clubs or organizations: Not on file     Relationship status: Not on file    Intimate partner violence:     Fear of current or ex partner: Not on file     Emotionally abused: Not on file     Physically abused: Not on file     Forced sexual activity: Not on file   Other Topics Concern    Not on file   Social History Narrative    Not on file       FAMILY HISTORY:  History reviewed. No pertinent family history.    ALLERGIES AND MEDICATIONS: updated and reviewed.  Review of patient's allergies indicates:   Allergen Reactions    Amoxicillin     Bactrim [sulfamethoxazole-trimethoprim]      Current Outpatient Medications   Medication Sig Dispense Refill    ascorbic acid, vitamin C, (VITAMIN C) 100 MG tablet Take 500 mg by mouth once daily.      aspirin (ECOTRIN) 81 MG EC tablet Take 81 mg by mouth once daily.      atorvastatin (LIPITOR) 40 MG tablet Take 1 tablet (40 mg total) by mouth once daily. 90 tablet 3    azelastine (ASTELIN) 137 mcg (0.1 %) nasal spray 1 spray (137 mcg total) by Nasal route 2 (two) times daily. 30 mL 0    blood sugar diagnostic Strp Check  each 3    clopidogrel (PLAVIX) 75 mg tablet TAKE 1 TABLET EVERY DAY 90 tablet 0    colchicine 0.6 mg tablet Take 2 tablets by mouth, then one hour later take 1 additional tablet 3 tablet 0    enalapril (VASOTEC) 20 MG tablet Take 1 tablet (20 mg total) by mouth once daily. 90 tablet 1    glipiZIDE (GLUCOTROL) 2.5 MG TR24 TAKE 1 TABLET  DAILY WITH BREAKFAST. 90 tablet 1    ibuprofen (ADVIL,MOTRIN) 400 MG tablet       isosorbide mononitrate (IMDUR) 60 MG 24 hr tablet TAKE 1 TABLET EVERY DAY 90 tablet 1    levocetirizine (XYZAL) 5 MG tablet TAKE 1/2 TABLET BY MOUTH EVERY OTHER DAY 22 tablet 1    metoprolol succinate (TOPROL-XL) 25 MG 24 hr tablet TAKE 1 TABLET  "EVERY DAY 90 tablet 1    nitroGLYCERIN (NITROSTAT) 0.3 MG SL tablet Place 1 tablet (0.3 mg total) under the tongue every 5 (five) minutes as needed for Chest pain. 25 tablet 1    senna-docusate 8.6-50 mg (PERICOLACE) 8.6-50 mg per tablet Take 1 tablet by mouth once daily.       No current facility-administered medications for this visit.        ROS  Review of Systems   Constitutional: Negative for chills and fever.   HENT: Negative for hearing loss and sore throat.    Eyes: Negative for visual disturbance.   Respiratory: Negative for cough and shortness of breath.    Cardiovascular: Positive for chest pain (2/2 angina). Negative for palpitations and leg swelling.   Gastrointestinal: Negative for abdominal pain, constipation, diarrhea, nausea and vomiting.   Endocrine: Positive for cold intolerance (cold hands and feet).   Genitourinary: Negative for dysuria, frequency and urgency.   Musculoskeletal: Negative for arthralgias, joint swelling and myalgias.   Skin: Negative for rash and wound.   Neurological: Positive for dizziness. Negative for headaches.   Psychiatric/Behavioral: Negative for agitation and confusion. The patient is not nervous/anxious.          OBJECTIVE     Physical Exam  Vitals:    03/25/19 1048   BP: (!) 120/52   Pulse: 73   Resp: 18   Temp: 97.4 °F (36.3 °C)    Body mass index is 27.54 kg/m².  Weight: 68.3 kg (150 lb 9.2 oz)   Height: 5' 2" (157.5 cm)     Physical Exam   Constitutional: She is oriented to person, place, and time. She appears well-developed and well-nourished. No distress.   HENT:   Head: Normocephalic and atraumatic.   Right Ear: Hearing, tympanic membrane and external ear normal.   Left Ear: Hearing, tympanic membrane and external ear normal.   Nose: Nose normal. No rhinorrhea.   Mouth/Throat: Oropharynx is clear and moist. No uvula swelling. No posterior oropharyngeal edema or posterior oropharyngeal erythema.   Eyes: Conjunctivae and EOM are normal. Right eye exhibits no " discharge. Left eye exhibits no discharge. No scleral icterus.   Neck: Normal range of motion. Neck supple. No JVD present. No tracheal deviation present.   Cardiovascular: Normal rate, regular rhythm and intact distal pulses. Exam reveals no gallop and no friction rub.   No murmur heard.  Pulmonary/Chest: Effort normal and breath sounds normal. No respiratory distress. She has no wheezes.   Abdominal: Soft. Bowel sounds are normal. She exhibits no distension and no mass. There is no tenderness. There is no rebound and no guarding.   Musculoskeletal: Normal range of motion. She exhibits no edema, tenderness or deformity.   Neurological: She is alert and oriented to person, place, and time. She exhibits normal muscle tone. Coordination normal.   Skin: Skin is warm and dry. No rash noted. No erythema.   Psychiatric: She has a normal mood and affect. Her behavior is normal. Judgment and thought content normal.         Health Maintenance       Date Due Completion Date    TETANUS VACCINE 09/07/2015 9/7/2005    Influenza Vaccine 08/01/2018 12/12/2017    Pneumococcal Vaccine (65+ Low/Medium Risk) (2 of 2 - PPSV23) 12/12/2018 12/12/2017    Foot Exam 12/12/2018 12/12/2017    Override on 6/12/2017: Done    Lipid Panel 01/09/2019 1/9/2018    Hemoglobin A1c 02/13/2019 8/13/2018    Eye Exam 07/02/2019 7/2/2018    Override on 2/1/2018: Done ([facility])    Override on 2/6/2017: Done (Dr. Lindsay)    Aspirin/Antiplatelet Therapy 02/19/2020 2/19/2019            ASSESSMENT     89 y.o. female with     1. Annual physical exam    2. Stable angina    3. Type 2 diabetes mellitus with stage 3 chronic kidney disease, without long-term current use of insulin    4. Dizziness    5. Insomnia, unspecified type        PLAN:     1. Annual physical exam  - Counseled on age appropriate medical preventative services, including age appropriate cancer screenings, over all nutritional health, need for a consistent exercise regimen and an over  all push towards maintaining a vigorous and active lifestyle.  Counseled on age appropriate vaccines and discussed upcoming health care needs based on age/gender.  Spent time with patient counseling on need for a good patient/doctor relationship moving forward.  Discussed use of common OTC medications and supplements.  Discussed common dietary aids and use of caffeine and the need for good sleep hygiene and stress management.  - Labs today  - CBC auto differential; Future    2. Stable angina  - Stable; no acute issues  - Continue management per Cardiology-Dr. Byers  - atorvastatin (LIPITOR) 40 MG tablet; Take 1 tablet (40 mg total) by mouth once daily.  Dispense: 90 tablet; Refill: 3    3. Type 2 diabetes mellitus with stage 3 chronic kidney disease, without long-term current use of insulin  - Pt has been taking Glipizide from here and Glimepiride from outside provider, but then started having hypoglycemia; advised pt to discard of Glimepiride and take Glipizide only; she voiced understanding  - CBC auto differential; Future  - Microalbumin/creatinine urine ratio; Future    4. Dizziness  - Pt advised to take Meclizine prn     5. Insomnia, unspecified type  - Pt advised to continue reading and take OTC Melatonin prn        RTC in 6 months; pt requested to switch to me as PCP today and I accepted     Gretchen Pena MD  03/25/2019 11:25 AM        No follow-ups on file.

## 2019-04-22 DIAGNOSIS — I20.89 STABLE ANGINA: ICD-10-CM

## 2019-04-22 DIAGNOSIS — E11.51 TYPE 2 DIABETES MELLITUS WITH DIABETIC PERIPHERAL ANGIOPATHY WITHOUT GANGRENE, WITHOUT LONG-TERM CURRENT USE OF INSULIN: ICD-10-CM

## 2019-04-22 RX ORDER — ENALAPRIL MALEATE 20 MG/1
TABLET ORAL
Qty: 90 TABLET | Refills: 1 | Status: SHIPPED | OUTPATIENT
Start: 2019-04-22 | End: 2019-11-20 | Stop reason: SDUPTHER

## 2019-04-22 RX ORDER — CLOPIDOGREL BISULFATE 75 MG/1
TABLET ORAL
Qty: 90 TABLET | Refills: 0 | Status: SHIPPED | OUTPATIENT
Start: 2019-04-22 | End: 2019-08-23 | Stop reason: SDUPTHER

## 2019-06-10 DIAGNOSIS — N18.30 TYPE 2 DIABETES MELLITUS WITH STAGE 3 CHRONIC KIDNEY DISEASE, WITHOUT LONG-TERM CURRENT USE OF INSULIN: ICD-10-CM

## 2019-06-10 DIAGNOSIS — E11.22 TYPE 2 DIABETES MELLITUS WITH STAGE 3 CHRONIC KIDNEY DISEASE, WITHOUT LONG-TERM CURRENT USE OF INSULIN: ICD-10-CM

## 2019-06-11 RX ORDER — GLIPIZIDE 2.5 MG/1
TABLET, EXTENDED RELEASE ORAL
Qty: 90 TABLET | Refills: 1 | Status: SHIPPED | OUTPATIENT
Start: 2019-06-11 | End: 2020-01-06 | Stop reason: SDUPTHER

## 2019-08-23 DIAGNOSIS — I20.89 STABLE ANGINA: ICD-10-CM

## 2019-08-26 RX ORDER — CLOPIDOGREL BISULFATE 75 MG/1
TABLET ORAL
Qty: 90 TABLET | Refills: 0 | Status: SHIPPED | OUTPATIENT
Start: 2019-08-26 | End: 2019-11-20 | Stop reason: SDUPTHER

## 2019-08-26 RX ORDER — ISOSORBIDE MONONITRATE 60 MG/1
TABLET, EXTENDED RELEASE ORAL
Qty: 90 TABLET | Refills: 1 | Status: SHIPPED | OUTPATIENT
Start: 2019-08-26 | End: 2019-11-20 | Stop reason: SDUPTHER

## 2019-10-03 ENCOUNTER — HOSPITAL ENCOUNTER (OUTPATIENT)
Dept: RADIOLOGY | Facility: HOSPITAL | Age: 84
Discharge: HOME | End: 2019-10-03
Attending: INTERNAL MEDICINE
Payer: MEDICARE

## 2019-10-03 ENCOUNTER — OFFICE VISIT (OUTPATIENT)
Dept: FAMILY MEDICINE | Facility: CLINIC | Age: 84
End: 2019-10-03
Payer: MEDICARE

## 2019-10-03 VITALS
TEMPERATURE: 98 F | OXYGEN SATURATION: 97 % | BODY MASS INDEX: 26.64 KG/M2 | HEIGHT: 63 IN | WEIGHT: 150.38 LBS | RESPIRATION RATE: 16 BRPM | SYSTOLIC BLOOD PRESSURE: 116 MMHG | DIASTOLIC BLOOD PRESSURE: 70 MMHG | HEART RATE: 72 BPM

## 2019-10-03 DIAGNOSIS — R06.09 DOE (DYSPNEA ON EXERTION): ICD-10-CM

## 2019-10-03 DIAGNOSIS — R05.8 PRODUCTIVE COUGH: ICD-10-CM

## 2019-10-03 DIAGNOSIS — Z23 NEED FOR PNEUMOCOCCAL VACCINATION: ICD-10-CM

## 2019-10-03 DIAGNOSIS — R42 DIZZINESS: ICD-10-CM

## 2019-10-03 DIAGNOSIS — D53.9 NUTRITIONAL ANEMIA, UNSPECIFIED: ICD-10-CM

## 2019-10-03 DIAGNOSIS — H61.22 IMPACTED CERUMEN OF LEFT EAR: ICD-10-CM

## 2019-10-03 DIAGNOSIS — M62.838 MUSCLE SPASM: Primary | ICD-10-CM

## 2019-10-03 DIAGNOSIS — E11.319 TYPE 2 DIABETES MELLITUS WITH RETINOPATHY, WITHOUT LONG-TERM CURRENT USE OF INSULIN, MACULAR EDEMA PRESENCE UNSPECIFIED, UNSPECIFIED LATERALITY, UNSPECIFIED RETINOPATHY SEVERITY: ICD-10-CM

## 2019-10-03 PROCEDURE — 99214 OFFICE O/P EST MOD 30 MIN: CPT | Mod: 25,HCNC,S$GLB, | Performed by: INTERNAL MEDICINE

## 2019-10-03 PROCEDURE — 69210 PR REMOVAL IMPACTED CERUMEN REQUIRING INSTRUMENTATION, UNILATERAL: ICD-10-PCS | Mod: HCNC,S$GLB,, | Performed by: INTERNAL MEDICINE

## 2019-10-03 PROCEDURE — 71046 XR CHEST PA AND LATERAL: ICD-10-PCS | Mod: 26,HCNC,, | Performed by: RADIOLOGY

## 2019-10-03 PROCEDURE — 1101F PR PT FALLS ASSESS DOC 0-1 FALLS W/OUT INJ PAST YR: ICD-10-PCS | Mod: HCNC,CPTII,S$GLB, | Performed by: INTERNAL MEDICINE

## 2019-10-03 PROCEDURE — 99499 UNLISTED E&M SERVICE: CPT | Mod: HCNC,S$GLB,, | Performed by: INTERNAL MEDICINE

## 2019-10-03 PROCEDURE — 69210 REMOVE IMPACTED EAR WAX UNI: CPT | Mod: HCNC,S$GLB,, | Performed by: INTERNAL MEDICINE

## 2019-10-03 PROCEDURE — 71046 X-RAY EXAM CHEST 2 VIEWS: CPT | Mod: 26,HCNC,, | Performed by: RADIOLOGY

## 2019-10-03 PROCEDURE — 99499 RISK ADDL DX/OHS AUDIT: ICD-10-PCS | Mod: HCNC,S$GLB,, | Performed by: INTERNAL MEDICINE

## 2019-10-03 PROCEDURE — 99214 PR OFFICE/OUTPT VISIT, EST, LEVL IV, 30-39 MIN: ICD-10-PCS | Mod: 25,HCNC,S$GLB, | Performed by: INTERNAL MEDICINE

## 2019-10-03 PROCEDURE — G0009 ADMIN PNEUMOCOCCAL VACCINE: HCPCS | Mod: HCNC,S$GLB,, | Performed by: INTERNAL MEDICINE

## 2019-10-03 PROCEDURE — 71046 X-RAY EXAM CHEST 2 VIEWS: CPT | Mod: TC,HCNC,FY

## 2019-10-03 PROCEDURE — 1101F PT FALLS ASSESS-DOCD LE1/YR: CPT | Mod: HCNC,CPTII,S$GLB, | Performed by: INTERNAL MEDICINE

## 2019-10-03 PROCEDURE — G0009 PNEUMOCOCCAL POLYSACCHARIDE VACCINE 23-VALENT =>2YO SQ IM: ICD-10-PCS | Mod: HCNC,S$GLB,, | Performed by: INTERNAL MEDICINE

## 2019-10-03 PROCEDURE — 99999 PR PBB SHADOW E&M-EST. PATIENT-LVL III: CPT | Mod: PBBFAC,HCNC,, | Performed by: INTERNAL MEDICINE

## 2019-10-03 PROCEDURE — 90732 PPSV23 VACC 2 YRS+ SUBQ/IM: CPT | Mod: HCNC,S$GLB,, | Performed by: INTERNAL MEDICINE

## 2019-10-03 PROCEDURE — 99999 PR PBB SHADOW E&M-EST. PATIENT-LVL III: ICD-10-PCS | Mod: PBBFAC,HCNC,, | Performed by: INTERNAL MEDICINE

## 2019-10-03 PROCEDURE — 90732 PNEUMOCOCCAL POLYSACCHARIDE VACCINE 23-VALENT =>2YO SQ IM: ICD-10-PCS | Mod: HCNC,S$GLB,, | Performed by: INTERNAL MEDICINE

## 2019-10-03 RX ORDER — NEOMYCIN/BACITRACIN/POLYMYXINB 3.5-400-5K
OINTMENT (GRAM) TOPICAL
COMMUNITY
End: 2021-04-07

## 2019-10-03 NOTE — PROGRESS NOTES
SUBJECTIVE     Chief Complaint   Patient presents with    Diabetes    Tingling     both hands       HPI  Tonya Marquez is a 89 y.o. female with multiple medical diagnoses as listed in the medical history and problem list that presents for evaluation of weakness to the B/L hands x 2 months(L hand and R hand started a couple weeks ago). Pt reports the hands have been bart with spasms. The spasms occur with activity and lasts for a few seconds and then resolve. She presents today because it is becoming more frequent. Pt has not been taking/applying any meds. She has also had some dizziness on occasion.    PAST MEDICAL HISTORY:  Past Medical History:   Diagnosis Date    Coronary artery disease     Diabetes mellitus type I     Hyperlipidemia     Hypertension        PAST SURGICAL HISTORY:  Past Surgical History:   Procedure Laterality Date    ADENOIDECTOMY      CATARACT EXTRACTION, BILATERAL      CORONARY STENT PLACEMENT  08/21/2016    dr. ruiz       SOCIAL HISTORY:  Social History     Socioeconomic History    Marital status:      Spouse name: Not on file    Number of children: Not on file    Years of education: Not on file    Highest education level: Not on file   Occupational History    Not on file   Social Needs    Financial resource strain: Not on file    Food insecurity:     Worry: Not on file     Inability: Not on file    Transportation needs:     Medical: Not on file     Non-medical: Not on file   Tobacco Use    Smoking status: Never Smoker    Smokeless tobacco: Never Used   Substance and Sexual Activity    Alcohol use: Not on file    Drug use: Not on file    Sexual activity: Not on file   Lifestyle    Physical activity:     Days per week: Not on file     Minutes per session: Not on file    Stress: Not on file   Relationships    Social connections:     Talks on phone: Not on file     Gets together: Not on file     Attends Anabaptist service: Not on file     Active  member of club or organization: Not on file     Attends meetings of clubs or organizations: Not on file     Relationship status: Not on file   Other Topics Concern    Not on file   Social History Narrative    Not on file       FAMILY HISTORY:  History reviewed. No pertinent family history.    ALLERGIES AND MEDICATIONS: updated and reviewed.  Review of patient's allergies indicates:   Allergen Reactions    Amoxicillin     Bactrim [sulfamethoxazole-trimethoprim]      Current Outpatient Medications   Medication Sig Dispense Refill    ascorbic acid, vitamin C, (VITAMIN C) 100 MG tablet Take 500 mg by mouth once daily.      aspirin (ECOTRIN) 81 MG EC tablet Take 81 mg by mouth once daily.      atorvastatin (LIPITOR) 40 MG tablet Take 1 tablet (40 mg total) by mouth once daily. 90 tablet 3    blood sugar diagnostic Strp Check  each 3    clopidogrel (PLAVIX) 75 mg tablet TAKE 1 TABLET EVERY DAY 90 tablet 0    colchicine 0.6 mg tablet Take 2 tablets by mouth, then one hour later take 1 additional tablet 3 tablet 0    enalapril (VASOTEC) 20 MG tablet TAKE 1 TABLET EVERY DAY 90 tablet 1    isosorbide mononitrate (IMDUR) 60 MG 24 hr tablet TAKE 1 TABLET EVERY DAY 90 tablet 1    melatonin-pyridoxal phos, B6, (MELATONIN-B6, PYRIDOXAL PHOS,) 2.5 mg- 338 mcg Subl Place under the tongue.      metoprolol succinate (TOPROL-XL) 25 MG 24 hr tablet TAKE 1 TABLET EVERY DAY 90 tablet 1    nitroGLYCERIN (NITROSTAT) 0.3 MG SL tablet Place 1 tablet (0.3 mg total) under the tongue every 5 (five) minutes as needed for Chest pain. 25 tablet 1    senna-docusate 8.6-50 mg (PERICOLACE) 8.6-50 mg per tablet Take 1 tablet by mouth once daily.      azelastine (ASTELIN) 137 mcg (0.1 %) nasal spray 1 spray (137 mcg total) by Nasal route 2 (two) times daily. 30 mL 0    glipiZIDE (GLUCOTROL) 2.5 MG TR24 TAKE 1 TABLET DAILY WITH BREAKFAST 90 tablet 1    ibuprofen (ADVIL,MOTRIN) 400 MG tablet       levocetirizine (XYZAL) 5 MG  "tablet TAKE 1/2 TABLET BY MOUTH EVERY OTHER DAY 22 tablet 1     No current facility-administered medications for this visit.        ROS  Review of Systems   Constitutional: Negative for chills and fever.   HENT: Negative for hearing loss and sore throat.    Eyes: Negative for visual disturbance.   Respiratory: Positive for cough (productive) and shortness of breath. Stridor: GREER.    Cardiovascular: Negative for chest pain, palpitations and leg swelling.   Gastrointestinal: Negative for abdominal pain, constipation, diarrhea, nausea and vomiting.   Genitourinary: Negative for dysuria, frequency and urgency.   Musculoskeletal: Negative for arthralgias, joint swelling and myalgias.        Muscle spasms to hand   Skin: Negative for rash and wound.   Neurological: Positive for dizziness (described as feeling unsteady on her feet). Negative for headaches.   Psychiatric/Behavioral: Negative for agitation and confusion. The patient is not nervous/anxious.          OBJECTIVE     Physical Exam  Vitals:    10/03/19 1447   BP: 116/70   Pulse: 72   Resp: 16   Temp: 98.2 °F (36.8 °C)    Body mass index is 26.63 kg/m².  Weight: 68.2 kg (150 lb 5.7 oz)   Height: 5' 3" (160 cm)     Physical Exam   Constitutional: She is oriented to person, place, and time. She appears well-developed and well-nourished. No distress.   HENT:   Head: Normocephalic and atraumatic.   Right Ear: External ear normal.   Left Ear: External ear normal.   Nose: Nose normal.   Mouth/Throat: Oropharynx is clear and moist.   Left ear cerumen impaction     Eyes: Conjunctivae and EOM are normal. Right eye exhibits no discharge. Left eye exhibits no discharge. No scleral icterus.   Neck: Normal range of motion. Neck supple. No JVD present. No tracheal deviation present.   Cardiovascular: Normal rate, regular rhythm and intact distal pulses. Exam reveals no gallop and no friction rub.   No murmur heard.  Pulmonary/Chest: Effort normal and breath sounds normal. No " respiratory distress. She has no wheezes.   Abdominal: Soft. Bowel sounds are normal. She exhibits no distension and no mass. There is no tenderness. There is no rebound and no guarding.   Musculoskeletal: Normal range of motion. She exhibits no edema, tenderness or deformity.   Neurological: She is alert and oriented to person, place, and time. She exhibits normal muscle tone. Coordination normal.   Skin: Skin is warm and dry. No rash noted. No erythema.   Psychiatric: She has a normal mood and affect. Her behavior is normal. Judgment and thought content normal.         Health Maintenance       Date Due Completion Date    TETANUS VACCINE 09/07/2015 9/7/2005    Shingles Vaccine (2 of 3) 09/21/2015 7/27/2015    Pneumococcal Vaccine (65+ High/Highest Risk) (2 of 2 - PPSV23) 02/06/2018 12/12/2017    Hemoglobin A1c 09/25/2019 3/25/2019    Lipid Panel 03/25/2020 3/25/2019    Aspirin/Antiplatelet Therapy 10/03/2020 10/3/2019            ASSESSMENT     89 y.o. female with     1. Muscle spasm    2. Dizziness    3. GREER (dyspnea on exertion)    4. Productive cough    5. Type 2 diabetes mellitus with retinopathy, without long-term current use of insulin, macular edema presence unspecified, unspecified laterality, unspecified retinopathy severity    6. Impacted cerumen of left ear    7. Need for pneumococcal vaccination    8. Nutritional anemia, unspecified         PLAN:     1. Muscle spasm  - Labs for further eval  - CBC auto differential; Future  - Comprehensive metabolic panel; Future  - TSH; Future  - Magnesium; Future  - CK; Future  - C-reactive protein; Future  - Sedimentation rate; Future    2. Dizziness  - CBC auto differential; Future  - Comprehensive metabolic panel; Future  - Vitamin B12; Future  - Folate; Future    3. GREER (dyspnea on exertion)  - X-Ray Chest PA And Lateral; Future    4. Productive cough  - X-Ray Chest PA And Lateral; Future    5. Type 2 diabetes mellitus with retinopathy, without long-term current  use of insulin, macular edema presence unspecified, unspecified laterality, unspecified retinopathy severity  - Stable; no acute issues  - The current medical regimen is effective;  continue present plan and medications.  - Hemoglobin A1c; Future  - TSH; Future    6. Impacted cerumen of left ear  - s/p removal with curette on exam today    7. Need for pneumococcal vaccination  - (In Office Administered) Pneumococcal Polysaccharide Vaccine (23 Valent) (SQ/IM)    8. Nutritional anemia, unspecified   - Vitamin B12; Future  - Folate; Future        RTC in 2 weeks for repeat assessment of current treatment plan       Gretchen Pena MD  10/03/2019 3:17 PM        No follow-ups on file.

## 2019-10-03 NOTE — PROGRESS NOTES
After obtaining consent, and per orders of Dr. Pena, injection of PCV23 given into the left deltoid by Elba Hawkins. Patient instructed to remain in clinic for 20 minutes afterwards, and to report any adverse reaction to me immediately.

## 2019-10-08 RX ORDER — METOPROLOL SUCCINATE 25 MG/1
TABLET, EXTENDED RELEASE ORAL
Qty: 90 TABLET | Refills: 1 | Status: SHIPPED | OUTPATIENT
Start: 2019-10-08 | End: 2020-04-03

## 2019-11-04 ENCOUNTER — PATIENT OUTREACH (OUTPATIENT)
Dept: ADMINISTRATIVE | Facility: OTHER | Age: 84
End: 2019-11-04

## 2019-11-04 ENCOUNTER — OFFICE VISIT (OUTPATIENT)
Dept: VASCULAR SURGERY | Facility: CLINIC | Age: 84
End: 2019-11-04
Payer: MEDICARE

## 2019-11-04 VITALS
SYSTOLIC BLOOD PRESSURE: 118 MMHG | DIASTOLIC BLOOD PRESSURE: 60 MMHG | WEIGHT: 154.19 LBS | BODY MASS INDEX: 27.32 KG/M2 | HEIGHT: 63 IN | HEART RATE: 89 BPM

## 2019-11-04 DIAGNOSIS — I87.2 VENOUS INSUFFICIENCY: Primary | ICD-10-CM

## 2019-11-04 PROCEDURE — 99214 PR OFFICE/OUTPT VISIT, EST, LEVL IV, 30-39 MIN: ICD-10-PCS | Mod: HCNC,S$GLB,, | Performed by: SURGERY

## 2019-11-04 PROCEDURE — 1101F PR PT FALLS ASSESS DOC 0-1 FALLS W/OUT INJ PAST YR: ICD-10-PCS | Mod: HCNC,CPTII,S$GLB, | Performed by: SURGERY

## 2019-11-04 PROCEDURE — 99999 PR PBB SHADOW E&M-EST. PATIENT-LVL III: ICD-10-PCS | Mod: PBBFAC,HCNC,, | Performed by: SURGERY

## 2019-11-04 PROCEDURE — 99999 PR PBB SHADOW E&M-EST. PATIENT-LVL III: CPT | Mod: PBBFAC,HCNC,, | Performed by: SURGERY

## 2019-11-04 PROCEDURE — 1101F PT FALLS ASSESS-DOCD LE1/YR: CPT | Mod: HCNC,CPTII,S$GLB, | Performed by: SURGERY

## 2019-11-04 PROCEDURE — 99214 OFFICE O/P EST MOD 30 MIN: CPT | Mod: HCNC,S$GLB,, | Performed by: SURGERY

## 2019-11-04 NOTE — LETTER
November 4, 2019        Gretchen Pena MD  [address] [address]  [address]  [place] [address]             [facility]  [address]  [place] [address]  Phone: [phone]  Fax: [phone]   Patient: Tonya Marquez   MR Number: [id number]   YOB: 1929   Date of Visit: 11/4/2019       Dear Dr. Pena:    Thank you for referring Tonya Marquez to me for evaluation. Below are the relevant portions of my assessment and plan of care.            If you have questions, please do not hesitate to call me. I look forward to following Tonya along with you.    Sincerely,      Robert Love MD           CC  No Recipients

## 2019-11-04 NOTE — PATIENT INSTRUCTIONS
Putting on Compression Stockings     Turn the stocking inside-out, then fit it over your toes and heel.          Roll the stocking up your leg.            Once stockings are on, make sure the top of the stocking is about two fingers width below the crease of the knee (or the groin if you wear thigh-high stockings).          Use equipment, such as a stocking margo, or wear rubber gloves to make it easier to put on compression stockings.         Elastic compression stockings are prescribed to treat many vein problems. Wearing them may be the most important thing you do to manage your symptoms. The stockings fit tightly around your ankle, gradually reducing in pressure as they go up your legs. This helps keep blood flowing to your heart. As a result, swelling is reduced. Your healthcare provider will prescribe stockings at a safe pressure for you. He or she will also tell you how often to wear and remove the stockings. Follow these instructions closely. Also, do not buy or wear compression stockings without first seeing your healthcare provider.  Tips for wear and care  To wear stockings safely and to get the most benefit:  · Wear the length prescribed by your healthcare provider.  · Pull them to the designated height and no farther. Dont let them bunch at the top. This can restrict blood flow and increase swelling.  · Wear the stockings for the amount of time your healthcare provider recommends. Replace them when they start to feel loose. This will likely be every 3 to 6 months.  · Remove them as your healthcare provider directs. When removed, wash your legs. Then check your legs and feet for sores. Call your healthcare provider if you find a sore. Dont put the stockings back on unless your healthcare provider directs.   · Wash the stockings as instructed. They may need to be hand-washed.  Date Last Reviewed: 5/1/2016  © 6690-5871 [employer]. [address] [place] [address], [place] [address]. All rights  reserved. This information is not intended as a substitute for professional medical care. Always follow your healthcare professional's instructions.        Understanding Chronic Venous Insufficiency  Problems with the veins in the legs may lead to chronic venous insufficiency (CVI). CVI means that there is a long-term problem with the veins not being able to pump blood back to your heart. When this happens, blood stays in the legs and causes swelling and aching.   Two problems that may lead to chronic venous insufficiency are:  · Damaged valves. Valves keep blood flowing from the legs through the blood vessels and back to the heart. When the valves are damaged, blood does not flow as well.   · Deep vein thrombosis (DVT). Blood clots may form in the deep veins of the legs. This may cause pain, redness, and swelling in the legs. It may also block the flow of blood back to the heart. Seek immediate medical care if you have these symptoms.  · A blood clot in the leg can also break off and travel to the lungs. This is called pulmonary embolism (PE). In the lungs, the clot can cut off the flow of blood. This may cause chest pain, trouble breathing, sweating, a fast heartbeat, coughing (may cough up blood), and fainting. It is a medical emergency and may cause death. Call 911 if you have these symptoms.  · Healthcare providers call the two conditions, DVT and PE, venous thromboembolism (VTE).  CVI cant be cured, but you can control leg swelling to reduce the likelihood of ulcers (sores).  Recognizing the symptoms  Be aware of the following:  · If you stand or sit with your feet down for long periods, your legs may ache or feel heavy.  · Swollen ankles are possibly the most common symptom of CVI.  · As swelling increases, the skin over your ankles may show red spots or a brownish tinge. The skin may feel leathery or scaly, and may start to itch.  · If swelling is not controlled, an ulcer (open wound) may form.  What you  can do  Reduce your risk of developing ulcers by doing the following:  · Increase blood flow back to your heart by elevating your legs, exercising daily, and wearing elastic stockings.  · Boost blood flow in your legs by losing excess weight.  · If you must stand or sit in one place for a period of time, keep your blood moving by wiggling your toes, shifting your body position, and rising up on the balls of your feet.    Date Last Reviewed: 5/1/2016 © 2000-2017 [employer]. [address] [place] [address], [place] [address]. All rights reserved. This information is not intended as a substitute for professional medical care. Always follow your healthcare professional's instructions.        Tips for Using Less Salt    Most people with heart problems need to eat less salt (sodium). Reducing the amount of salt you eat may help control your blood pressure. The higher your blood pressure, the greater your risk for heart disease, stroke, blindness, and kidney problems.  At the store  · Make low-salt choices by reading labels carefully. Look for the total amount of sodium per serving.  · Use more fresh food. Buy more fruits and vegetables. Select lean meats, fish, and poultry.  · Use fewer frozen, canned, and packaged foods which often contain a lot of sodium.  · Use plain frozen vegetables without sauces or toppings. These products are often low- or no-sodium.  · Opt for reduced-sodium or no-salt-added versions of canned vegetables and soups.  In the kitchen  · Don't add salt to food when you're cooking. Season with flavorings such as onion, garlic, pepper, salt-free herbal blends, and lemon or lime juice.  · Use a cookbook containing low-salt recipes. It can give you ideas for tasty meals that are healthy for your heart.  · Sprinkle salt-free herbal blends on vegetables and meat.  · Drain and rinse canned foods, such as canned beans and vegetables, before cooking or eating.  Eating out  · Tell the  you're on a  low-salt diet. Ask questions about the menu.  · Order fish, chicken, and meat broiled, baked, poached, or grilled without salt, butter, or breading.  · Use lemon, pepper, and salt-free herb mixes to add flavor.  · Choose plain steamed rice, boiled noodles, and baked or boiled potatoes. Top potatoes with chives and a little sour cream.     Beware! Salt goes by many other names. Limit foods with these words listed as ingredients: salt, sodium, soy sauce, baking soda, baking powder, MSG, monosodium, Na (the chemical symbol for sodium). Some antacids are also high in salt.   Date Last Reviewed: 6/19/2015 © 2000-2017 [employer]. [address] [place] [address]. All rights reserved. This information is not intended as a substitute for professional medical care. Always follow your healthcare professional's instructions.        Low-Salt Diet  This diet removes foods that are high in salt. It also limits the amount of salt you use when cooking. It is most often used for people with high blood pressure, edema (fluid retention), and kidney, liver, or heart disease.  Table salt contains the mineral sodium. Your body needs sodium to work normally. But too much sodium can make your health problems worse. Your healthcare provider is recommending a low-salt (also called low-sodium) diet for you. Your total daily allowance of salt is 1,500 to 2,300 milligrams (mg). It is less than 1 teaspoon of table salt. This means you can have only about 500 to 700 mg of sodium at each meal. People with certain health problems should limit salt intake to the lower end of the recommended range.    When you cook, dont add much salt. If you can cook without using salt, even better. Dont add salt to your food at the table.  When shopping, read food labels. Salt is often called sodium on the label. Choose foods that are salt-free, low salt, or very low salt. Note that foods with reduced salt may not lower your salt intake  enough.    Beans, potatoes, and pasta  Ok: Dry beans, split peas, lentils, potatoes, rice, macaroni, pasta, spaghetti without added salt  Avoid: Potato chips, tortilla chips, and similar products  Breads and cereals  Ok: Low-sodium breads, rolls, cereals, and cakes; low-salt crackers, matzo crackers  Avoid: Salted crackers, pretzels, popcorn, Kiswahili toast, pancakes, muffins  Dairy  Ok: Milk, chocolate milk, hot chocolate mix, low-salt cheeses, and yogurt  Avoid: Processed cheese and cheese spreads; Roquefort, Camembert, and cottage cheese; buttermilk, instant breakfast drink  Desserts  Ok: Ice cream, frozen yogurt, juice bars, gelatin, cookies and pies, sugar, honey, jelly, hard candy  Avoid: Most pies, cakes and cookies prepared or processed with salt; instant pudding  Drinks  Ok: Tea, coffee, fizzy (carbonated) drinks, juices  Avoid: Flavored coffees, electrolyte replacement drinks, sports drinks  Meats  Ok: All fresh meat, fish, poultry, low-salt tuna, eggs, egg substitute  Avoid: Smoked, pickled, brine-cured, or salted meats and fish. This includes alvares, chipped beef, corned beef, hot dogs, deli meats, ham, kosher meats, salt pork, sausage, canned tuna, salted codfish, smoked salmon, herring, sardines, or anchovies.  Seasonings and spices  Ok: Most seasonings are okay. Good substitutes for salt include: fresh herb blends, hot sauce, lemon, garlic, arana, vinegar, dry mustard, parsley, cilantro, horseradish, tomato paste, regular margarine, mayonnaise, unsalted butter, cream cheese, vegetable oil, cream, low-salt salad dressing and gravy.  Avoid: Regular ketchup, relishes, pickles, soy sauce, teriyaki sauce, Worcestershire sauce, BBQ sauce, tartar sauce, meat tenderizer, chili sauce, regular gravy, regular salad dressing, salted butter  Soups  Ok: Low-salt soups and broths made with allowed foods  Avoid: Bouillon cubes, soups with smoked or salted meats, regular soup and broth  Vegetables  Ok: Most vegetables  are okay; also low-salt tomato and vegetable juices  Avoid: Sauerkraut and other brine-soaked vegetables; pickles and other pickled vegetables; tomato juice, olives  Date Last Reviewed: 8/1/2016 © 2000-2017 [employer]. [address] [place] [address]. All rights reserved. This information is not intended as a substitute for professional medical care. Always follow your healthcare professional's instructions.        Low-Salt Choices  Eating salt (sodium) can make your body retain too much water. Excess water makes your heart work harder. Canned, packaged, and frozen foods are easy to prepare, but they are often high in sodium. Here are some ideas for low-salt foods you can easily prepare yourself.    For breakfast  · Fruit or 100% fruit juice  · Whole-wheat bread or an English muffin. Compare sodium content on labels.  · Low-fat milk or yogurt  · Unsalted eggs  · Shredded wheat  · Corn tortillas  · Unsalted steamed rice  · Regular (not instant) hot cereal, made without salt  Stay away from:  · Sausage, alvares, and ham  · Flour tortillas  · Packaged muffins, pancakes, and biscuits  · Instant hot cereals  · Cottage cheese  For lunch and dinner  · Fresh fish, chicken, turkey, or meat--baked, broiled, or roasted without salt  · Dry beans, cooked without salt  · Tofu, stir-fried without salt  · Unsalted fresh fruit and vegetables, or frozen or canned fruit and vegetables with no added salt  Stay away from:  · Lunch or deli meat that is cured or smoked  · Cheese  · Tomato juice and catsup  · Canned vegetables, soups, and fish not labeled as no-salt-added or reduced sodium  · Packaged gravies and sauces  · Olives, pickles, and relish  · Bottled salad dressings  For snacks and desserts  · Yogurt  · Unsalted, air popped popcorn  · Unsalted nuts or seeds  Stay away from:  · Pies and cakes  · Packaged dessert mixes  · Pizza  · Canned and packaged puddings  · Pretzels, chips, crackers, and nuts--unless  the label says unsalted  Date Last Reviewed: 6/17/2015  © 3529-5178 [employer]. [address] [place] [address], [place] [address]. All rights reserved. This information is not intended as a substitute for professional medical care. Always follow your healthcare professional's instructions.

## 2019-11-04 NOTE — PROGRESS NOTES
Robert Love MD VI                       [facility]                         11/04/2019    HPI:  Tonya Marquez is a 89 y.o. female with   Patient Active Problem List   Diagnosis    Old MI (myocardial infarction)    Venous insufficiency of both lower extremities    Pure hypercholesterolemia    Stable angina    Type 2 diabetes mellitus with diabetic polyneuropathy, without long-term current use of insulin    TMJ arthralgia    Chronic idiopathic constipation    Chronic kidney disease (CKD), stage III (moderate)    Coronary artery disease of native artery of native heart with stable angina pectoris    Diabetes mellitus type 2 with retinopathy    Type 2 diabetes mellitus with stage 3 chronic kidney disease    Acute gout of left hand   s/p MI 2016 requiring coronary stenting at [facility] being managed by PCP and specialists who is here today for evaluation of BLE varicose veins and edema.  She is s/p L GSV stripping 20-30 yrs ago and L SSV sclerotherapy 2015.  Cont to have c/o BLE pain.  Describes BLE pain nightly.  Patient states location is bilateral calf occurring for 2 years.  Associated signs and symptoms are none.  Quality is cramping and severity is 5/10.  Symptoms began 2 yrs ago after L SSV sclerotherapy.  Alleviating factors include exercise and pain medication.  Worsening factors include dependency.  Denies BLE edema, inflammatory symptoms and wounds.    yes MI  no Stroke  Tobacco use: never smoker    3/8/18: Wearing stockings, BLE pain improved.  Ambulating well.      8/30/19:  Wearing stockings although heat does prohibit.  Cont to exercise and eat low Na diet.    Interval history:  C/o R calf edema last 3 days.  No significant change in daily activities.  +compression, elevation and low Na diet.    Past Medical History:   Diagnosis Date    Coronary artery disease     Diabetes mellitus type I     Hyperlipidemia     Hypertension      Past Surgical History:    Procedure Laterality Date    ADENOIDECTOMY      CATARACT EXTRACTION, BILATERAL      CORONARY STENT PLACEMENT  08/21/2016    dr. ruiz     No family history on file.  Social History     Socioeconomic History    Marital status:      Spouse name: Not on file    Number of children: Not on file    Years of education: Not on file    Highest education level: Not on file   Occupational History    Not on file   Social Needs    Financial resource strain: Not on file    Food insecurity:     Worry: Not on file     Inability: Not on file    Transportation needs:     Medical: Not on file     Non-medical: Not on file   Tobacco Use    Smoking status: Never Smoker    Smokeless tobacco: Never Used   Substance and Sexual Activity    Alcohol use: Not on file    Drug use: Not on file    Sexual activity: Not on file   Lifestyle    Physical activity:     Days per week: Not on file     Minutes per session: Not on file    Stress: Not on file   Relationships    Social connections:     Talks on phone: Not on file     Gets together: Not on file     Attends Quaker service: Not on file     Active member of club or organization: Not on file     Attends meetings of clubs or organizations: Not on file     Relationship status: Not on file   Other Topics Concern    Not on file   Social History Narrative    Not on file       Current Outpatient Medications:     ascorbic acid, vitamin C, (VITAMIN C) 100 MG tablet, Take 500 mg by mouth once daily., Disp: , Rfl:     aspirin (ECOTRIN) 81 MG EC tablet, Take 81 mg by mouth once daily., Disp: , Rfl:     atorvastatin (LIPITOR) 40 MG tablet, Take 1 tablet (40 mg total) by mouth once daily., Disp: 90 tablet, Rfl: 3    blood sugar diagnostic Strp, Check BID, Disp: 150 each, Rfl: 3    clopidogrel (PLAVIX) 75 mg tablet, TAKE 1 TABLET EVERY DAY, Disp: 90 tablet, Rfl: 0    colchicine 0.6 mg tablet, Take 2 tablets by mouth, then one hour later take 1 additional tablet, Disp:  3 tablet, Rfl: 0    enalapril (VASOTEC) 20 MG tablet, TAKE 1 TABLET EVERY DAY, Disp: 90 tablet, Rfl: 1    glipiZIDE (GLUCOTROL) 2.5 MG TR24, TAKE 1 TABLET DAILY WITH BREAKFAST, Disp: 90 tablet, Rfl: 1    ibuprofen (ADVIL,MOTRIN) 400 MG tablet, , Disp: , Rfl:     isosorbide mononitrate (IMDUR) 60 MG 24 hr tablet, TAKE 1 TABLET EVERY DAY, Disp: 90 tablet, Rfl: 1    levocetirizine (XYZAL) 5 MG tablet, TAKE 1/2 TABLET BY MOUTH EVERY OTHER DAY, Disp: 22 tablet, Rfl: 1    melatonin-pyridoxal phos, B6, (MELATONIN-B6, PYRIDOXAL PHOS,) 2.5 mg- 338 mcg Subl, Place under the tongue., Disp: , Rfl:     metoprolol succinate (TOPROL-XL) 25 MG 24 hr tablet, TAKE 1 TABLET EVERY DAY, Disp: 90 tablet, Rfl: 1    nitroGLYCERIN (NITROSTAT) 0.3 MG SL tablet, Place 1 tablet (0.3 mg total) under the tongue every 5 (five) minutes as needed for Chest pain., Disp: 25 tablet, Rfl: 1    senna-docusate 8.6-50 mg (PERICOLACE) 8.6-50 mg per tablet, Take 1 tablet by mouth once daily., Disp: , Rfl:     azelastine (ASTELIN) 137 mcg (0.1 %) nasal spray, 1 spray (137 mcg total) by Nasal route 2 (two) times daily., Disp: 30 mL, Rfl: 0    REVIEW OF SYSTEMS:  General: No fevers or chills; ENT: No sore throat; Allergy and Immunology: no persistent infections; Hematological and Lymphatic: No history of bleeding or easy bruising; Endocrine: negative; Respiratory: no cough, shortness of breath, or wheezing; Cardiovascular: no chest pain or dyspnea on exertion; Gastrointestinal: no abdominal pain/back, change in bowel habits, or bloody stools; Genito-Urinary: no dysuria, trouble voiding, or hematuria; Musculoskeletal: negative; Neurological: no TIA or stroke symptoms; Psychiatric: no nervousness, anxiety or depression.    PHYSICAL EXAM:      Pulse: 89         General appearance:  Alert, well-appearing, and in no distress.  Oriented to person, place, and time                    Neurological: Normal speech, no focal findings noted; CN II - XII grossly  intact. RLE with sensation to light touch, LLE with sensation to light touch.            Musculoskeletal: Digits/nail without cyanosis/clubbing.  Strength 5/5 BLE.                    Neck: Supple, no significant adenopathy, no carotid bruit can be auscultated                  Chest:  Clear to auscultation, no wheezes, rales or rhonchi, symmetric air entry. No use of accessory muscles               Cardiac: Normal rate and regular rhythm, S1 and S2 normal            Abdomen: Soft, nontender, nondistended, no masses or organomegaly, no hernia     No rebound tenderness noted; bowel sounds normal     No groin adenopathy      Extremities:   2+ R femoral pulse, 2+ L femoral pulse     1+ R popliteal pulse, 1+ L popliteal pulse     1+ R PT pulse, 1+ L PT pulse     2+ R DP pulse, 2+ L DP pulse     no RLE edema, no LLE edema    Skin: RLE without ulcer; LLE without ulcer     Bilateral LE varicose veins, no thrombophlebitis, bilateral LE reticular veins, minimal edema BLE     CEAP RLE 3, 3    LAB RESULTS:  No results found for: CBC  Lab Results   Component Value Date    LABPROT 10.3 09/24/2010    INR 1.0 09/24/2010     Lab Results   Component Value Date     10/03/2019    K 4.7 10/03/2019     10/03/2019    CO2 22 (L) 10/03/2019     (H) 10/03/2019    BUN 29 (H) 10/03/2019    CREATININE 1.4 10/03/2019    CALCIUM 10.4 10/03/2019    ANIONGAP 10 10/03/2019    EGFRNONAA 33 (A) 10/03/2019     Lab Results   Component Value Date    WBC 6.91 10/03/2019    RBC 3.95 (L) 10/03/2019    HGB 11.7 (L) 10/03/2019    HCT 35.8 (L) 10/03/2019    MCV 91 10/03/2019    MCH 29.6 10/03/2019    MCHC 32.7 10/03/2019    RDW 13.6 10/03/2019     10/03/2019    MPV 11.1 10/03/2019    GRAN 4.4 10/03/2019    GRAN 63.4 10/03/2019    LYMPH 1.8 10/03/2019    LYMPH 25.3 10/03/2019    MONO 0.6 10/03/2019    MONO 8.1 10/03/2019    EOS 0.2 10/03/2019    BASO 0.02 10/03/2019    EOSINOPHIL 2.9 10/03/2019    BASOPHIL 0.3 10/03/2019    DIFFMETHOD  Automated 10/03/2019     .  Lab Results   Component Value Date    HGBA1C 6.9 (H) 10/03/2019       IMAGING:  All pertinent imaging has been reviewed and interpreted independently.    No outside imaging available.    CORA 1/1    No DVT.  R GSV reflux.  L GSV stripped.  L SSV without reflux.    IMP/PLAN:  88 y.o. female with   Patient Active Problem List   Diagnosis    Old MI (myocardial infarction)    PAD (peripheral artery disease)    Pure hypercholesterolemia    Stable angina    Type 2 diabetes mellitus, without long-term current use of insulin    TMJ arthralgia    Chronic idiopathic constipation    Chronic kidney disease (CKD), stage III (moderate)   s/p MI 2016 requiring coronary stenting at [facility] being managed by PCP and specialists who is here today for evaluation of BLE varicose veins and edema.    -R GSV reflux at mid thigh and below knee, no LLE reflux, BLE varicose veins with return of pain and edema with medical therapy, no skin changes- recommend repeat venous reflux US  -cont compression with Rx stockings, elevation, dietary changes associated with water and sodium intake discussed at length with patient  -No surgical intervention indicated at this time  -Will see pt back in 3 mo for further evaluation    I spent 20 minutes evaluating this patient and greater than 50% of the time was spent counseling, coordinator care and discussing the plan of care.  All questions were answered and patient stated understanding with agreement with the above treatment plan.    Robert Love MD [facility]  Vascular and Endovascular Surgery

## 2019-11-08 ENCOUNTER — HOSPITAL ENCOUNTER (OUTPATIENT)
Dept: CARDIOLOGY | Facility: HOSPITAL | Age: 84
Discharge: HOME | End: 2019-11-08
Attending: SURGERY
Payer: MEDICARE

## 2019-11-08 PROCEDURE — 93970 CV US LOWER VENOUS INSUFFICIENCY BILATERAL (CUPID ONLY): ICD-10-PCS | Mod: 26,HCNC,, | Performed by: SURGERY

## 2019-11-08 PROCEDURE — 93970 EXTREMITY STUDY: CPT | Mod: 50,TC,HCNC

## 2019-11-08 PROCEDURE — 93970 EXTREMITY STUDY: CPT | Mod: 26,HCNC,, | Performed by: SURGERY

## 2019-11-12 LAB
RIGHT GREAT SAPHENOUS JUNCTION DIA: 0.5 CM
RIGHT GREAT SAPHENOUS JUNCTION REFLUX: 1023 MS
RIGHT GREAT SAPHENOUS MIDDLE THIGH DIA: 0.4 CM
RIGHT GREAT SAPHENOUS MIDDLE THIGH REFLUX: 0 MS
RIGHT SMALL SAPHENOUS KNEE DIA: 0.4 CM
RIGHT SMALL SAPHENOUS KNEE REFLUX: 0 MS
RIGHT SMALL SAPHENOUS SPJ DIA: 0.2 CM
RIGHT SMALL SAPHENOUS SPJ REFLUX: 1994 MS

## 2019-11-20 DIAGNOSIS — E11.51 TYPE 2 DIABETES MELLITUS WITH DIABETIC PERIPHERAL ANGIOPATHY WITHOUT GANGRENE, WITHOUT LONG-TERM CURRENT USE OF INSULIN: ICD-10-CM

## 2019-11-20 DIAGNOSIS — I20.89 STABLE ANGINA: ICD-10-CM

## 2019-11-20 RX ORDER — ENALAPRIL MALEATE 20 MG/1
20 TABLET ORAL DAILY
Qty: 90 TABLET | Refills: 1 | Status: SHIPPED | OUTPATIENT
Start: 2019-11-20 | End: 2020-05-04

## 2019-11-20 RX ORDER — ISOSORBIDE MONONITRATE 60 MG/1
60 TABLET, EXTENDED RELEASE ORAL DAILY
Qty: 90 TABLET | Refills: 3 | Status: SHIPPED | OUTPATIENT
Start: 2019-11-20 | End: 2020-09-09 | Stop reason: SDUPTHER

## 2019-11-20 RX ORDER — CLOPIDOGREL BISULFATE 75 MG/1
75 TABLET ORAL DAILY
Qty: 90 TABLET | Refills: 3 | Status: SHIPPED | OUTPATIENT
Start: 2019-11-20 | End: 2021-04-12 | Stop reason: SDUPTHER

## 2020-01-06 DIAGNOSIS — E11.22 TYPE 2 DIABETES MELLITUS WITH STAGE 3 CHRONIC KIDNEY DISEASE, WITHOUT LONG-TERM CURRENT USE OF INSULIN: ICD-10-CM

## 2020-01-06 DIAGNOSIS — N18.30 TYPE 2 DIABETES MELLITUS WITH STAGE 3 CHRONIC KIDNEY DISEASE, WITHOUT LONG-TERM CURRENT USE OF INSULIN: ICD-10-CM

## 2020-01-06 NOTE — TELEPHONE ENCOUNTER
Last Office Visit Info:   The patient's last visit with Gretchen Pena MD was on 10/3/2019.    The patient's last visit in current department was on 10/3/2019.        Last CBC Results:   Lab Results   Component Value Date    WBC 6.91 10/03/2019    HGB 11.7 (L) 10/03/2019    HCT 35.8 (L) 10/03/2019     10/03/2019       Last CMP Results  Lab Results   Component Value Date     10/03/2019    K 4.7 10/03/2019     10/03/2019    CO2 22 (L) 10/03/2019    BUN 29 (H) 10/03/2019    CREATININE 1.4 10/03/2019    CALCIUM 10.4 10/03/2019    ALBUMIN 4.4 10/03/2019    AST 17 10/03/2019    ALT 18 10/03/2019       Last Lipids  Lab Results   Component Value Date    CHOL 121 03/25/2019    TRIG 169 (H) 03/25/2019    HDL 50 03/25/2019    LDLCALC 37.2 (L) 03/25/2019       Last A1C  Lab Results   Component Value Date    HGBA1C 6.9 (H) 10/03/2019       Last TSH  Lab Results   Component Value Date    TSH 0.550 10/03/2019         Current Med Refills  Medication List with Changes/Refills   Current Medications    ASCORBIC ACID, VITAMIN C, (VITAMIN C) 100 MG TABLET    Take 500 mg by mouth once daily.       Start Date: --        End Date: --    ASPIRIN (ECOTRIN) 81 MG EC TABLET    Take 81 mg by mouth once daily.       Start Date: --        End Date: --    ATORVASTATIN (LIPITOR) 40 MG TABLET    Take 1 tablet (40 mg total) by mouth once daily.       Start Date: 3/25/2019 End Date: --    AZELASTINE (ASTELIN) 137 MCG (0.1 %) NASAL SPRAY    1 spray (137 mcg total) by Nasal route 2 (two) times daily.       Start Date: 9/5/2018  End Date: 9/5/2019    BLOOD SUGAR DIAGNOSTIC STRP    Check BID       Start Date: 6/12/2017 End Date: --    CLOPIDOGREL (PLAVIX) 75 MG TABLET    Take 1 tablet (75 mg total) by mouth once daily.       Start Date: 11/20/2019End Date: --    COLCHICINE 0.6 MG TABLET    Take 2 tablets by mouth, then one hour later take 1 additional tablet       Start Date: 8/14/2018 End Date: --    ENALAPRIL (VASOTEC) 20 MG TABLET     Take 1 tablet (20 mg total) by mouth once daily.       Start Date: 11/20/2019End Date: --    GLIPIZIDE (GLUCOTROL) 2.5 MG TR24    TAKE 1 TABLET DAILY WITH BREAKFAST       Start Date: 6/11/2019 End Date: --    IBUPROFEN (ADVIL,MOTRIN) 400 MG TABLET           Start Date: 11/7/2017 End Date: --    ISOSORBIDE MONONITRATE (IMDUR) 60 MG 24 HR TABLET    Take 1 tablet (60 mg total) by mouth once daily.       Start Date: 11/20/2019End Date: --    LEVOCETIRIZINE (XYZAL) 5 MG TABLET    TAKE 1/2 TABLET BY MOUTH EVERY OTHER DAY       Start Date: 9/6/2018  End Date: --    MELATONIN-PYRIDOXAL PHOS, B6, (MELATONIN-B6, PYRIDOXAL PHOS,) 2.5 MG- 338 MCG SUBL    Place under the tongue.       Start Date: --        End Date: --    METOPROLOL SUCCINATE (TOPROL-XL) 25 MG 24 HR TABLET    TAKE 1 TABLET EVERY DAY       Start Date: 10/8/2019 End Date: --    NITROGLYCERIN (NITROSTAT) 0.3 MG SL TABLET    Place 1 tablet (0.3 mg total) under the tongue every 5 (five) minutes as needed for Chest pain.       Start Date: 6/12/2017 End Date: --    SENNA-DOCUSATE 8.6-50 MG (PERICOLACE) 8.6-50 MG PER TABLET    Take 1 tablet by mouth once daily.       Start Date: --        End Date: --       Order(s) placed per written order guidelines:     Please advise.

## 2020-01-07 RX ORDER — GLIPIZIDE 2.5 MG/1
2.5 TABLET, EXTENDED RELEASE ORAL
Qty: 90 TABLET | Refills: 1 | Status: SHIPPED | OUTPATIENT
Start: 2020-01-07 | End: 2020-08-28

## 2020-01-30 ENCOUNTER — PATIENT OUTREACH (OUTPATIENT)
Dept: ADMINISTRATIVE | Facility: OTHER | Age: 85
End: 2020-01-30

## 2020-02-03 ENCOUNTER — PES CALL (OUTPATIENT)
Dept: ADMINISTRATIVE | Facility: CLINIC | Age: 85
End: 2020-02-03

## 2020-02-03 ENCOUNTER — OFFICE VISIT (OUTPATIENT)
Dept: VASCULAR SURGERY | Facility: CLINIC | Age: 85
End: 2020-02-03
Payer: MEDICARE

## 2020-02-03 VITALS
DIASTOLIC BLOOD PRESSURE: 54 MMHG | SYSTOLIC BLOOD PRESSURE: 115 MMHG | HEIGHT: 63 IN | WEIGHT: 159.63 LBS | BODY MASS INDEX: 28.29 KG/M2

## 2020-02-03 DIAGNOSIS — I87.2 VENOUS INSUFFICIENCY: Primary | ICD-10-CM

## 2020-02-03 PROCEDURE — 1126F PR PAIN SEVERITY QUANTIFIED, NO PAIN PRESENT: ICD-10-PCS | Mod: HCNC,S$GLB,, | Performed by: SURGERY

## 2020-02-03 PROCEDURE — 99214 PR OFFICE/OUTPT VISIT, EST, LEVL IV, 30-39 MIN: ICD-10-PCS | Mod: HCNC,S$GLB,, | Performed by: SURGERY

## 2020-02-03 PROCEDURE — 1159F MED LIST DOCD IN RCRD: CPT | Mod: HCNC,S$GLB,, | Performed by: SURGERY

## 2020-02-03 PROCEDURE — 99214 OFFICE O/P EST MOD 30 MIN: CPT | Mod: HCNC,S$GLB,, | Performed by: SURGERY

## 2020-02-03 PROCEDURE — 1101F PR PT FALLS ASSESS DOC 0-1 FALLS W/OUT INJ PAST YR: ICD-10-PCS | Mod: HCNC,CPTII,S$GLB, | Performed by: SURGERY

## 2020-02-03 PROCEDURE — 1159F PR MEDICATION LIST DOCUMENTED IN MEDICAL RECORD: ICD-10-PCS | Mod: HCNC,S$GLB,, | Performed by: SURGERY

## 2020-02-03 PROCEDURE — 1126F AMNT PAIN NOTED NONE PRSNT: CPT | Mod: HCNC,S$GLB,, | Performed by: SURGERY

## 2020-02-03 PROCEDURE — 99999 PR PBB SHADOW E&M-EST. PATIENT-LVL IV: CPT | Mod: PBBFAC,HCNC,, | Performed by: SURGERY

## 2020-02-03 PROCEDURE — 99999 PR PBB SHADOW E&M-EST. PATIENT-LVL IV: ICD-10-PCS | Mod: PBBFAC,HCNC,, | Performed by: SURGERY

## 2020-02-03 PROCEDURE — 1101F PT FALLS ASSESS-DOCD LE1/YR: CPT | Mod: HCNC,CPTII,S$GLB, | Performed by: SURGERY

## 2020-02-03 NOTE — PROGRESS NOTES
Robert Love MD VI                       [facility]                         02/03/2020    HPI:  Tonya Marquez is a 89 y.o. female with   Patient Active Problem List   Diagnosis    Old MI (myocardial infarction)    Venous insufficiency of both lower extremities    Pure hypercholesterolemia    Stable angina    Type 2 diabetes mellitus with diabetic polyneuropathy, without long-term current use of insulin    TMJ arthralgia    Chronic idiopathic constipation    Chronic kidney disease (CKD), stage III (moderate)    Coronary artery disease of native artery of native heart with stable angina pectoris    Diabetes mellitus type 2 with retinopathy    Type 2 diabetes mellitus with stage 3 chronic kidney disease    Acute gout of left hand   s/p MI 2016 requiring coronary stenting at [facility] being managed by PCP and specialists who is here today for evaluation of BLE varicose veins and edema.  She is s/p L GSV stripping 20-30 yrs ago and L SSV sclerotherapy 2015.  Cont to have c/o BLE pain.  Describes BLE pain nightly.  Patient states location is bilateral calf occurring for 2 years.  Associated signs and symptoms are none.  Quality is cramping and severity is 5/10.  Symptoms began 2 yrs ago after L SSV sclerotherapy.  Alleviating factors include exercise and pain medication.  Worsening factors include dependency.  Denies BLE edema, inflammatory symptoms and wounds.    yes MI  no Stroke  Tobacco use: never smoker    3/8/18: Wearing stockings, BLE pain improved.  Ambulating well.      8/30/19:  Wearing stockings although heat does prohibit.  Cont to exercise and eat low Na diet.    Interval history:  C/o R calf edema last 3 days.  No significant change in daily activities.  +compression, elevation and low Na diet.    Past Medical History:   Diagnosis Date    Coronary artery disease     Diabetes mellitus type I     Hyperlipidemia     Hypertension      Past Surgical History:    Procedure Laterality Date    ADENOIDECTOMY      CATARACT EXTRACTION, BILATERAL      CORONARY STENT PLACEMENT  08/21/2016    dr. ruiz     No family history on file.  Social History     Socioeconomic History    Marital status:      Spouse name: Not on file    Number of children: Not on file    Years of education: Not on file    Highest education level: Not on file   Occupational History    Not on file   Social Needs    Financial resource strain: Not on file    Food insecurity:     Worry: Not on file     Inability: Not on file    Transportation needs:     Medical: Not on file     Non-medical: Not on file   Tobacco Use    Smoking status: Never Smoker    Smokeless tobacco: Never Used   Substance and Sexual Activity    Alcohol use: Not on file    Drug use: Not on file    Sexual activity: Not on file   Lifestyle    Physical activity:     Days per week: Not on file     Minutes per session: Not on file    Stress: Not on file   Relationships    Social connections:     Talks on phone: Not on file     Gets together: Not on file     Attends Anabaptism service: Not on file     Active member of club or organization: Not on file     Attends meetings of clubs or organizations: Not on file     Relationship status: Not on file   Other Topics Concern    Not on file   Social History Narrative    Not on file       Current Outpatient Medications:     ascorbic acid, vitamin C, (VITAMIN C) 100 MG tablet, Take 500 mg by mouth once daily., Disp: , Rfl:     aspirin (ECOTRIN) 81 MG EC tablet, Take 81 mg by mouth once daily., Disp: , Rfl:     atorvastatin (LIPITOR) 40 MG tablet, Take 1 tablet (40 mg total) by mouth once daily., Disp: 90 tablet, Rfl: 3    blood sugar diagnostic Strp, Check BID, Disp: 150 each, Rfl: 3    clopidogrel (PLAVIX) 75 mg tablet, Take 1 tablet (75 mg total) by mouth once daily., Disp: 90 tablet, Rfl: 3    colchicine 0.6 mg tablet, Take 2 tablets by mouth, then one hour later take 1  additional tablet, Disp: 3 tablet, Rfl: 0    enalapril (VASOTEC) 20 MG tablet, Take 1 tablet (20 mg total) by mouth once daily., Disp: 90 tablet, Rfl: 1    glipiZIDE (GLUCOTROL) 2.5 MG TR24, Take 1 tablet (2.5 mg total) by mouth daily with breakfast., Disp: 90 tablet, Rfl: 1    ibuprofen (ADVIL,MOTRIN) 400 MG tablet, , Disp: , Rfl:     isosorbide mononitrate (IMDUR) 60 MG 24 hr tablet, Take 1 tablet (60 mg total) by mouth once daily., Disp: 90 tablet, Rfl: 3    levocetirizine (XYZAL) 5 MG tablet, TAKE 1/2 TABLET BY MOUTH EVERY OTHER DAY, Disp: 22 tablet, Rfl: 1    melatonin-pyridoxal phos, B6, (MELATONIN-B6, PYRIDOXAL PHOS,) 2.5 mg- 338 mcg Subl, Place under the tongue., Disp: , Rfl:     metoprolol succinate (TOPROL-XL) 25 MG 24 hr tablet, TAKE 1 TABLET EVERY DAY, Disp: 90 tablet, Rfl: 1    nitroGLYCERIN (NITROSTAT) 0.3 MG SL tablet, Place 1 tablet (0.3 mg total) under the tongue every 5 (five) minutes as needed for Chest pain., Disp: 25 tablet, Rfl: 1    senna-docusate 8.6-50 mg (PERICOLACE) 8.6-50 mg per tablet, Take 1 tablet by mouth once daily., Disp: , Rfl:     azelastine (ASTELIN) 137 mcg (0.1 %) nasal spray, 1 spray (137 mcg total) by Nasal route 2 (two) times daily., Disp: 30 mL, Rfl: 0    REVIEW OF SYSTEMS:  General: No fevers or chills; ENT: No sore throat; Allergy and Immunology: no persistent infections; Hematological and Lymphatic: No history of bleeding or easy bruising; Endocrine: negative; Respiratory: no cough, shortness of breath, or wheezing; Cardiovascular: no chest pain or dyspnea on exertion; Gastrointestinal: no abdominal pain/back, change in bowel habits, or bloody stools; Genito-Urinary: no dysuria, trouble voiding, or hematuria; Musculoskeletal: negative; Neurological: no TIA or stroke symptoms; Psychiatric: no nervousness, anxiety or depression.    PHYSICAL EXAM:                General appearance:  Alert, well-appearing, and in no distress.  Oriented to person, place, and time                     Neurological: Normal speech, no focal findings noted; CN II - XII grossly intact. RLE with sensation to light touch, LLE with sensation to light touch.            Musculoskeletal: Digits/nail without cyanosis/clubbing.  Strength 5/5 BLE.                    Neck: Supple, no significant adenopathy, no carotid bruit can be auscultated                  Chest:  Clear to auscultation, no wheezes, rales or rhonchi, symmetric air entry. No use of accessory muscles               Cardiac: Normal rate and regular rhythm, S1 and S2 normal            Abdomen: Soft, nontender, nondistended, no masses or organomegaly, no hernia     No rebound tenderness noted; bowel sounds normal     No groin adenopathy      Extremities:   2+ R femoral pulse, 2+ L femoral pulse     1+ R popliteal pulse, 1+ L popliteal pulse     1+ R PT pulse, 1+ L PT pulse     2+ R DP pulse, 2+ L DP pulse     no RLE edema, no LLE edema    Skin: RLE without ulcer; LLE without ulcer     Bilateral LE varicose veins, no thrombophlebitis, bilateral LE reticular veins, minimal edema BLE     CEAP RLE 3, 3    LAB RESULTS:  No results found for: CBC  Lab Results   Component Value Date    LABPROT 10.3 09/24/2010    INR 1.0 09/24/2010     Lab Results   Component Value Date     10/03/2019    K 4.7 10/03/2019     10/03/2019    CO2 22 (L) 10/03/2019     (H) 10/03/2019    BUN 29 (H) 10/03/2019    CREATININE 1.4 10/03/2019    CALCIUM 10.4 10/03/2019    ANIONGAP 10 10/03/2019    EGFRNONAA 33 (A) 10/03/2019     Lab Results   Component Value Date    WBC 6.91 10/03/2019    RBC 3.95 (L) 10/03/2019    HGB 11.7 (L) 10/03/2019    HCT 35.8 (L) 10/03/2019    MCV 91 10/03/2019    MCH 29.6 10/03/2019    MCHC 32.7 10/03/2019    RDW 13.6 10/03/2019     10/03/2019    MPV 11.1 10/03/2019    GRAN 4.4 10/03/2019    GRAN 63.4 10/03/2019    LYMPH 1.8 10/03/2019    LYMPH 25.3 10/03/2019    MONO 0.6 10/03/2019    MONO 8.1 10/03/2019    EOS 0.2 10/03/2019     BASO 0.02 10/03/2019    EOSINOPHIL 2.9 10/03/2019    BASOPHIL 0.3 10/03/2019    DIFFMETHOD Automated 10/03/2019     .  Lab Results   Component Value Date    HGBA1C 6.9 (H) 10/03/2019       IMAGING:  All pertinent imaging has been reviewed and interpreted independently.    No outside imaging available.    CORA 1/1    No DVT.  R GSV reflux.  L GSV stripped.  L SSV without reflux.    IMP/PLAN:  88 y.o. female with   Patient Active Problem List   Diagnosis    Old MI (myocardial infarction)    PAD (peripheral artery disease)    Pure hypercholesterolemia    Stable angina    Type 2 diabetes mellitus, without long-term current use of insulin    TMJ arthralgia    Chronic idiopathic constipation    Chronic kidney disease (CKD), stage III (moderate)   s/p MI 2016 requiring coronary stenting at [facility] being managed by PCP and specialists who is here today for evaluation of BLE varicose veins and edema.    -R GSV reflux at mid thigh and below knee, no LLE reflux, BLE varicose veins with return of pain and edema with medical therapy, no skin changes; repeat venous reflux US shows R SFJ reflux and R SSV reflux -cont compression with Rx stockings, elevation, dietary changes associated with water and sodium intake discussed at length with patient  -No surgical intervention indicated at this time  -Recommend evaluation by PCP for R groin discomfort with mobility/exercises - possible MSK etiology, 2+ femoral pulse and no significant edema  -RTC 1 year for further evaluation, sooner if problems arise    I spent 20 minutes evaluating this patient and greater than 50% of the time was spent counseling, coordinator care and discussing the plan of care.  All questions were answered and patient stated understanding with agreement with the above treatment plan.    Robert Love MD [facility]  Vascular and Endovascular Surgery

## 2020-02-03 NOTE — LETTER
February 3, 2020        Gretchen Pena MD  [address] [address]  [address]  [place] [address]             [facility]  [address]  [place] [address]  Phone: [phone]  Fax: [phone]   Patient: Tonya Marquez   MR Number: [id number]   YOB: 1929   Date of Visit: 2/3/2020       Dear Dr. Pena:    Thank you for referring Tonya Marquez to me for evaluation. Below are the relevant portions of my assessment and plan of care.            If you have questions, please do not hesitate to call me. I look forward to following Tonya along with you.    Sincerely,      Robert Love MD           CC  No Recipients

## 2020-02-03 NOTE — MEDICAL/APP STUDENT
Varicose Veins  Patient complains of varicose veins. Symptoms include prominent veins on the left greater than right  spider veins on the bilateral  lower extremity edema on the bilateral. Symptoms have been ongoing for about 3 months. Symptoms have gradually worsened. Patient has been evaluated for this previously.  Evaluation to date has included: CV US Lower Extremity vein bilaterally. Treatment to date has included: OTC stockings, low Na diet, exercise, monitoring fluid intake: not very effective.

## 2020-02-03 NOTE — PATIENT INSTRUCTIONS
Putting on Compression Stockings     Turn the stocking inside-out, then fit it over your toes and heel.          Roll the stocking up your leg.            Once stockings are on, make sure the top of the stocking is about two fingers width below the crease of the knee (or the groin if you wear thigh-high stockings).          Use equipment, such as a stocking margo, or wear rubber gloves to make it easier to put on compression stockings.         Elastic compression stockings are prescribed to treat many vein problems. Wearing them may be the most important thing you do to manage your symptoms. The stockings fit tightly around your ankle, gradually reducing in pressure as they go up your legs. This helps keep blood flowing to your heart. As a result, swelling is reduced. Your healthcare provider will prescribe stockings at a safe pressure for you. He or she will also tell you how often to wear and remove the stockings. Follow these instructions closely. Also, do not buy or wear compression stockings without first seeing your healthcare provider.  Tips for wear and care  To wear stockings safely and to get the most benefit:  · Wear the length prescribed by your healthcare provider.  · Pull them to the designated height and no farther. Dont let them bunch at the top. This can restrict blood flow and increase swelling.  · Wear the stockings for the amount of time your healthcare provider recommends. Replace them when they start to feel loose. This will likely be every 3 to 6 months.  · Remove them as your healthcare provider directs. When removed, wash your legs. Then check your legs and feet for sores. Call your healthcare provider if you find a sore. Dont put the stockings back on unless your healthcare provider directs.   · Wash the stockings as instructed. They may need to be hand-washed.  Date Last Reviewed: 5/1/2016  © 7339-9686 [employer]. [address] [place] [address], [place] [address]. All rights  reserved. This information is not intended as a substitute for professional medical care. Always follow your healthcare professional's instructions.        Understanding Chronic Venous Insufficiency  Problems with the veins in the legs may lead to chronic venous insufficiency (CVI). CVI means that there is a long-term problem with the veins not being able to pump blood back to your heart. When this happens, blood stays in the legs and causes swelling and aching.   Two problems that may lead to chronic venous insufficiency are:  · Damaged valves. Valves keep blood flowing from the legs through the blood vessels and back to the heart. When the valves are damaged, blood does not flow as well.   · Deep vein thrombosis (DVT). Blood clots may form in the deep veins of the legs. This may cause pain, redness, and swelling in the legs. It may also block the flow of blood back to the heart. Seek immediate medical care if you have these symptoms.  · A blood clot in the leg can also break off and travel to the lungs. This is called pulmonary embolism (PE). In the lungs, the clot can cut off the flow of blood. This may cause chest pain, trouble breathing, sweating, a fast heartbeat, coughing (may cough up blood), and fainting. It is a medical emergency and may cause death. Call 911 if you have these symptoms.  · Healthcare providers call the two conditions, DVT and PE, venous thromboembolism (VTE).  CVI cant be cured, but you can control leg swelling to reduce the likelihood of ulcers (sores).  Recognizing the symptoms  Be aware of the following:  · If you stand or sit with your feet down for long periods, your legs may ache or feel heavy.  · Swollen ankles are possibly the most common symptom of CVI.  · As swelling increases, the skin over your ankles may show red spots or a brownish tinge. The skin may feel leathery or scaly, and may start to itch.  · If swelling is not controlled, an ulcer (open wound) may form.  What you  can do  Reduce your risk of developing ulcers by doing the following:  · Increase blood flow back to your heart by elevating your legs, exercising daily, and wearing elastic stockings.  · Boost blood flow in your legs by losing excess weight.  · If you must stand or sit in one place for a period of time, keep your blood moving by wiggling your toes, shifting your body position, and rising up on the balls of your feet.    Date Last Reviewed: 5/1/2016 © 2000-2017 [employer]. [address] [place] [address], [place] [address]. All rights reserved. This information is not intended as a substitute for professional medical care. Always follow your healthcare professional's instructions.        Tips for Using Less Salt    Most people with heart problems need to eat less salt (sodium). Reducing the amount of salt you eat may help control your blood pressure. The higher your blood pressure, the greater your risk for heart disease, stroke, blindness, and kidney problems.  At the store  · Make low-salt choices by reading labels carefully. Look for the total amount of sodium per serving.  · Use more fresh food. Buy more fruits and vegetables. Select lean meats, fish, and poultry.  · Use fewer frozen, canned, and packaged foods which often contain a lot of sodium.  · Use plain frozen vegetables without sauces or toppings. These products are often low- or no-sodium.  · Opt for reduced-sodium or no-salt-added versions of canned vegetables and soups.  In the kitchen  · Don't add salt to food when you're cooking. Season with flavorings such as onion, garlic, pepper, salt-free herbal blends, and lemon or lime juice.  · Use a cookbook containing low-salt recipes. It can give you ideas for tasty meals that are healthy for your heart.  · Sprinkle salt-free herbal blends on vegetables and meat.  · Drain and rinse canned foods, such as canned beans and vegetables, before cooking or eating.  Eating out  · Tell the  you're on a  low-salt diet. Ask questions about the menu.  · Order fish, chicken, and meat broiled, baked, poached, or grilled without salt, butter, or breading.  · Use lemon, pepper, and salt-free herb mixes to add flavor.  · Choose plain steamed rice, boiled noodles, and baked or boiled potatoes. Top potatoes with chives and a little sour cream.     Beware! Salt goes by many other names. Limit foods with these words listed as ingredients: salt, sodium, soy sauce, baking soda, baking powder, MSG, monosodium, Na (the chemical symbol for sodium). Some antacids are also high in salt.   Date Last Reviewed: 6/19/2015 © 2000-2017 [employer]. [address] [place] [address]. All rights reserved. This information is not intended as a substitute for professional medical care. Always follow your healthcare professional's instructions.        Low-Salt Diet  This diet removes foods that are high in salt. It also limits the amount of salt you use when cooking. It is most often used for people with high blood pressure, edema (fluid retention), and kidney, liver, or heart disease.  Table salt contains the mineral sodium. Your body needs sodium to work normally. But too much sodium can make your health problems worse. Your healthcare provider is recommending a low-salt (also called low-sodium) diet for you. Your total daily allowance of salt is 1,500 to 2,300 milligrams (mg). It is less than 1 teaspoon of table salt. This means you can have only about 500 to 700 mg of sodium at each meal. People with certain health problems should limit salt intake to the lower end of the recommended range.    When you cook, dont add much salt. If you can cook without using salt, even better. Dont add salt to your food at the table.  When shopping, read food labels. Salt is often called sodium on the label. Choose foods that are salt-free, low salt, or very low salt. Note that foods with reduced salt may not lower your salt intake  enough.    Beans, potatoes, and pasta  Ok: Dry beans, split peas, lentils, potatoes, rice, macaroni, pasta, spaghetti without added salt  Avoid: Potato chips, tortilla chips, and similar products  Breads and cereals  Ok: Low-sodium breads, rolls, cereals, and cakes; low-salt crackers, matzo crackers  Avoid: Salted crackers, pretzels, popcorn, Lao toast, pancakes, muffins  Dairy  Ok: Milk, chocolate milk, hot chocolate mix, low-salt cheeses, and yogurt  Avoid: Processed cheese and cheese spreads; Roquefort, Camembert, and cottage cheese; buttermilk, instant breakfast drink  Desserts  Ok: Ice cream, frozen yogurt, juice bars, gelatin, cookies and pies, sugar, honey, jelly, hard candy  Avoid: Most pies, cakes and cookies prepared or processed with salt; instant pudding  Drinks  Ok: Tea, coffee, fizzy (carbonated) drinks, juices  Avoid: Flavored coffees, electrolyte replacement drinks, sports drinks  Meats  Ok: All fresh meat, fish, poultry, low-salt tuna, eggs, egg substitute  Avoid: Smoked, pickled, brine-cured, or salted meats and fish. This includes alvares, chipped beef, corned beef, hot dogs, deli meats, ham, kosher meats, salt pork, sausage, canned tuna, salted codfish, smoked salmon, herring, sardines, or anchovies.  Seasonings and spices  Ok: Most seasonings are okay. Good substitutes for salt include: fresh herb blends, hot sauce, lemon, garlic, arana, vinegar, dry mustard, parsley, cilantro, horseradish, tomato paste, regular margarine, mayonnaise, unsalted butter, cream cheese, vegetable oil, cream, low-salt salad dressing and gravy.  Avoid: Regular ketchup, relishes, pickles, soy sauce, teriyaki sauce, Worcestershire sauce, BBQ sauce, tartar sauce, meat tenderizer, chili sauce, regular gravy, regular salad dressing, salted butter  Soups  Ok: Low-salt soups and broths made with allowed foods  Avoid: Bouillon cubes, soups with smoked or salted meats, regular soup and broth  Vegetables  Ok: Most vegetables  are okay; also low-salt tomato and vegetable juices  Avoid: Sauerkraut and other brine-soaked vegetables; pickles and other pickled vegetables; tomato juice, olives  Date Last Reviewed: 8/1/2016 © 2000-2017 [employer]. [address] [place] [address]. All rights reserved. This information is not intended as a substitute for professional medical care. Always follow your healthcare professional's instructions.        Low-Salt Choices  Eating salt (sodium) can make your body retain too much water. Excess water makes your heart work harder. Canned, packaged, and frozen foods are easy to prepare, but they are often high in sodium. Here are some ideas for low-salt foods you can easily prepare yourself.    For breakfast  · Fruit or 100% fruit juice  · Whole-wheat bread or an English muffin. Compare sodium content on labels.  · Low-fat milk or yogurt  · Unsalted eggs  · Shredded wheat  · Corn tortillas  · Unsalted steamed rice  · Regular (not instant) hot cereal, made without salt  Stay away from:  · Sausage, alvares, and ham  · Flour tortillas  · Packaged muffins, pancakes, and biscuits  · Instant hot cereals  · Cottage cheese  For lunch and dinner  · Fresh fish, chicken, turkey, or meat--baked, broiled, or roasted without salt  · Dry beans, cooked without salt  · Tofu, stir-fried without salt  · Unsalted fresh fruit and vegetables, or frozen or canned fruit and vegetables with no added salt  Stay away from:  · Lunch or deli meat that is cured or smoked  · Cheese  · Tomato juice and catsup  · Canned vegetables, soups, and fish not labeled as no-salt-added or reduced sodium  · Packaged gravies and sauces  · Olives, pickles, and relish  · Bottled salad dressings  For snacks and desserts  · Yogurt  · Unsalted, air popped popcorn  · Unsalted nuts or seeds  Stay away from:  · Pies and cakes  · Packaged dessert mixes  · Pizza  · Canned and packaged puddings  · Pretzels, chips, crackers, and nuts--unless  the label says unsalted  Date Last Reviewed: 6/17/2015  © 6349-2627 [employer]. [address] [place] [address], [place] [address]. All rights reserved. This information is not intended as a substitute for professional medical care. Always follow your healthcare professional's instructions.

## 2020-04-03 RX ORDER — METOPROLOL SUCCINATE 25 MG/1
TABLET, EXTENDED RELEASE ORAL
Qty: 90 TABLET | Refills: 0 | Status: SHIPPED | OUTPATIENT
Start: 2020-04-03 | End: 2020-05-28

## 2020-05-02 DIAGNOSIS — E11.51 TYPE 2 DIABETES MELLITUS WITH DIABETIC PERIPHERAL ANGIOPATHY WITHOUT GANGRENE, WITHOUT LONG-TERM CURRENT USE OF INSULIN: ICD-10-CM

## 2020-05-04 RX ORDER — ENALAPRIL MALEATE 20 MG/1
TABLET ORAL
Qty: 90 TABLET | Refills: 0 | Status: SHIPPED | OUTPATIENT
Start: 2020-05-04 | End: 2020-08-28

## 2020-05-18 DIAGNOSIS — I20.89 STABLE ANGINA: ICD-10-CM

## 2020-05-18 RX ORDER — ATORVASTATIN CALCIUM 40 MG/1
40 TABLET, FILM COATED ORAL DAILY
Qty: 90 TABLET | Refills: 0 | Status: SHIPPED | OUTPATIENT
Start: 2020-05-18 | End: 2020-08-28

## 2020-05-28 RX ORDER — METOPROLOL SUCCINATE 25 MG/1
TABLET, EXTENDED RELEASE ORAL
Qty: 90 TABLET | Refills: 0 | Status: SHIPPED | OUTPATIENT
Start: 2020-05-28 | End: 2020-10-23 | Stop reason: SDUPTHER

## 2020-06-01 ENCOUNTER — OFFICE VISIT (OUTPATIENT)
Dept: FAMILY MEDICINE | Facility: CLINIC | Age: 85
End: 2020-06-01
Payer: MEDICARE

## 2020-06-01 VITALS
BODY MASS INDEX: 27.81 KG/M2 | TEMPERATURE: 99 F | SYSTOLIC BLOOD PRESSURE: 136 MMHG | WEIGHT: 156.94 LBS | HEIGHT: 63 IN | DIASTOLIC BLOOD PRESSURE: 80 MMHG | OXYGEN SATURATION: 97 % | HEART RATE: 78 BPM

## 2020-06-01 DIAGNOSIS — N18.30 CHRONIC KIDNEY DISEASE (CKD), STAGE III (MODERATE): ICD-10-CM

## 2020-06-01 DIAGNOSIS — H61.22 IMPACTED CERUMEN OF LEFT EAR: ICD-10-CM

## 2020-06-01 DIAGNOSIS — Z00.00 ANNUAL PHYSICAL EXAM: Primary | ICD-10-CM

## 2020-06-01 DIAGNOSIS — I83.93 VARICOSE VEINS OF BOTH LOWER EXTREMITIES, UNSPECIFIED WHETHER COMPLICATED: ICD-10-CM

## 2020-06-01 DIAGNOSIS — E11.319 TYPE 2 DIABETES MELLITUS WITH RETINOPATHY, WITHOUT LONG-TERM CURRENT USE OF INSULIN, MACULAR EDEMA PRESENCE UNSPECIFIED, UNSPECIFIED LATERALITY, UNSPECIFIED RETINOPATHY SEVERITY: ICD-10-CM

## 2020-06-01 DIAGNOSIS — R09.81 NASAL CONGESTION: ICD-10-CM

## 2020-06-01 DIAGNOSIS — E78.00 PURE HYPERCHOLESTEROLEMIA: ICD-10-CM

## 2020-06-01 DIAGNOSIS — H92.02 ACUTE OTALGIA, LEFT: ICD-10-CM

## 2020-06-01 PROCEDURE — 99999 PR PBB SHADOW E&M-EST. PATIENT-LVL IV: ICD-10-PCS | Mod: PBBFAC,HCNC,, | Performed by: INTERNAL MEDICINE

## 2020-06-01 PROCEDURE — 99499 RISK ADDL DX/OHS AUDIT: ICD-10-PCS | Mod: HCNC,S$GLB,, | Performed by: INTERNAL MEDICINE

## 2020-06-01 PROCEDURE — 69210 REMOVE IMPACTED EAR WAX UNI: CPT | Mod: HCNC,S$GLB,, | Performed by: INTERNAL MEDICINE

## 2020-06-01 PROCEDURE — 99397 PR PREVENTIVE VISIT,EST,65 & OVER: ICD-10-PCS | Mod: 25,HCNC,S$GLB, | Performed by: INTERNAL MEDICINE

## 2020-06-01 PROCEDURE — 99499 UNLISTED E&M SERVICE: CPT | Mod: HCNC,S$GLB,, | Performed by: INTERNAL MEDICINE

## 2020-06-01 PROCEDURE — 69210 PR REMOVAL IMPACTED CERUMEN REQUIRING INSTRUMENTATION, UNILATERAL: ICD-10-PCS | Mod: HCNC,S$GLB,, | Performed by: INTERNAL MEDICINE

## 2020-06-01 PROCEDURE — 99397 PER PM REEVAL EST PAT 65+ YR: CPT | Mod: 25,HCNC,S$GLB, | Performed by: INTERNAL MEDICINE

## 2020-06-01 PROCEDURE — 99999 PR PBB SHADOW E&M-EST. PATIENT-LVL IV: CPT | Mod: PBBFAC,HCNC,, | Performed by: INTERNAL MEDICINE

## 2020-06-01 NOTE — PROGRESS NOTES
SUBJECTIVE     Chief Complaint   Patient presents with    Annual Exam       HPI  Tonya Marquez is a 89 y.o. female with multiple medical diagnoses as listed in the medical history and problem list that presents for annual exam. Pt has been doing well since her last visit. She has a good appetite and eats well. She does exercise by walking. She sleeps for ~6 hours nightly. Pt does take OTC supplements, which is Melatonin. She does have any current stressors.     PAST MEDICAL HISTORY:  Past Medical History:   Diagnosis Date    Coronary artery disease     Diabetes mellitus type I     Hyperlipidemia     Hypertension        PAST SURGICAL HISTORY:  Past Surgical History:   Procedure Laterality Date    ADENOIDECTOMY      CATARACT EXTRACTION, BILATERAL      CORONARY STENT PLACEMENT  08/21/2016    dr. ruiz       SOCIAL HISTORY:  Social History     Socioeconomic History    Marital status:      Spouse name: Not on file    Number of children: Not on file    Years of education: Not on file    Highest education level: Not on file   Occupational History    Not on file   Social Needs    Financial resource strain: Not on file    Food insecurity:     Worry: Not on file     Inability: Not on file    Transportation needs:     Medical: Not on file     Non-medical: Not on file   Tobacco Use    Smoking status: Never Smoker    Smokeless tobacco: Never Used   Substance and Sexual Activity    Alcohol use: Not on file    Drug use: Not on file    Sexual activity: Not on file   Lifestyle    Physical activity:     Days per week: Not on file     Minutes per session: Not on file    Stress: Not at all   Relationships    Social connections:     Talks on phone: Not on file     Gets together: Not on file     Attends Orthodox service: Not on file     Active member of club or organization: Not on file     Attends meetings of clubs or organizations: Not on file     Relationship status: Not on file   Other Topics  Concern    Not on file   Social History Narrative    Not on file       FAMILY HISTORY:  History reviewed. No pertinent family history.    ALLERGIES AND MEDICATIONS: updated and reviewed.  Review of patient's allergies indicates:   Allergen Reactions    Amoxicillin     Bactrim [sulfamethoxazole-trimethoprim]      Current Outpatient Medications   Medication Sig Dispense Refill    ascorbic acid, vitamin C, (VITAMIN C) 100 MG tablet Take 500 mg by mouth once daily.      aspirin (ECOTRIN) 81 MG EC tablet Take 81 mg by mouth once daily.      atorvastatin (LIPITOR) 40 MG tablet TAKE 1 TABLET (40 MG TOTAL) BY MOUTH ONCE DAILY. 90 tablet 0    azelastine (ASTELIN) 137 mcg (0.1 %) nasal spray 1 spray (137 mcg total) by Nasal route 2 (two) times daily. 30 mL 0    blood sugar diagnostic Strp Check  each 3    enalapril (VASOTEC) 20 MG tablet TAKE 1 TABLET EVERY DAY 90 tablet 0    glipiZIDE (GLUCOTROL) 2.5 MG TR24 Take 1 tablet (2.5 mg total) by mouth daily with breakfast. 90 tablet 1    isosorbide mononitrate (IMDUR) 60 MG 24 hr tablet Take 1 tablet (60 mg total) by mouth once daily. 90 tablet 3    metoprolol succinate (TOPROL-XL) 25 MG 24 hr tablet TAKE 1 TABLET EVERY DAY 90 tablet 0    senna-docusate 8.6-50 mg (PERICOLACE) 8.6-50 mg per tablet Take 1 tablet by mouth once daily.      clopidogrel (PLAVIX) 75 mg tablet Take 1 tablet (75 mg total) by mouth once daily. (Patient not taking: Reported on 6/1/2020) 90 tablet 3    colchicine 0.6 mg tablet Take 2 tablets by mouth, then one hour later take 1 additional tablet (Patient not taking: Reported on 6/1/2020) 3 tablet 0    ibuprofen (ADVIL,MOTRIN) 400 MG tablet       levocetirizine (XYZAL) 5 MG tablet TAKE 1/2 TABLET BY MOUTH EVERY OTHER DAY (Patient not taking: Reported on 6/1/2020) 22 tablet 1    melatonin-pyridoxal phos, B6, (MELATONIN-B6, PYRIDOXAL PHOS,) 2.5 mg- 338 mcg Subl Place under the tongue.      nitroGLYCERIN (NITROSTAT) 0.3 MG SL tablet  "Place 1 tablet (0.3 mg total) under the tongue every 5 (five) minutes as needed for Chest pain. (Patient not taking: Reported on 6/1/2020) 25 tablet 1     No current facility-administered medications for this visit.        ROS  Review of Systems   Constitutional: Negative for chills and fever.   HENT: Positive for ear pain (left). Negative for hearing loss and sore throat.    Eyes: Negative for visual disturbance.   Respiratory: Negative for cough and shortness of breath.    Cardiovascular: Positive for chest pain (now resolved). Negative for palpitations and leg swelling.   Gastrointestinal: Negative for abdominal pain, constipation, diarrhea, nausea and vomiting.   Genitourinary: Negative for dysuria, frequency and urgency.   Musculoskeletal: Negative for arthralgias, joint swelling and myalgias.   Skin: Negative for rash and wound.   Neurological: Negative for headaches.   Psychiatric/Behavioral: Negative for agitation and confusion. The patient is not nervous/anxious.          OBJECTIVE     Physical Exam  Vitals:    06/01/20 1446   BP: 136/80   Pulse: 78   Temp: 98.6 °F (37 °C)    Body mass index is 27.81 kg/m².  Weight: 71.2 kg (156 lb 15.5 oz)   Height: 5' 3" (160 cm)     Physical Exam   Constitutional: She is oriented to person, place, and time. She appears well-developed and well-nourished. No distress.   HENT:   Head: Normocephalic and atraumatic.   Right Ear: External ear normal.   Left Ear: External ear normal.   Nose: Nose normal.   Mouth/Throat: Oropharynx is clear and moist.   L ear cerumen impaction   Eyes: Conjunctivae and EOM are normal. Right eye exhibits no discharge. Left eye exhibits no discharge. No scleral icterus.   Neck: Normal range of motion. Neck supple. No JVD present. No tracheal deviation present.   Cardiovascular: Normal rate, regular rhythm and intact distal pulses. Exam reveals no gallop and no friction rub.   No murmur heard.  Pulmonary/Chest: Effort normal and breath sounds " normal. No respiratory distress. She has no wheezes.   Abdominal: Soft. Bowel sounds are normal. She exhibits no distension and no mass. There is no tenderness. There is no rebound and no guarding.   Musculoskeletal: Normal range of motion. She exhibits no edema, tenderness or deformity.   Neurological: She is alert and oriented to person, place, and time. She exhibits normal muscle tone. Coordination normal.   Skin: Skin is warm and dry. No rash noted. No erythema.   BLE varicose veins   Psychiatric: She has a normal mood and affect. Her behavior is normal. Judgment and thought content normal.         Health Maintenance       Date Due Completion Date    TETANUS VACCINE 09/07/2015 9/7/2005    Shingles Vaccine (2 of 3) 09/21/2015 7/27/2015    Lipid Panel 03/25/2020 3/25/2019    Hemoglobin A1c 04/03/2020 10/3/2019    Aspirin/Antiplatelet Therapy 06/01/2021 6/1/2020            ASSESSMENT     89 y.o. female with     1. Annual physical exam    2. Acute otalgia, left    3. Nasal congestion    4. Type 2 diabetes mellitus with retinopathy, without long-term current use of insulin, macular edema presence unspecified, unspecified laterality, unspecified retinopathy severity    5. Pure hypercholesterolemia    6. Chronic kidney disease (CKD), stage III (moderate)    7. Impacted cerumen of left ear    8. Varicose veins of both lower extremities, unspecified whether complicated        PLAN:     1. Annual physical exam  - Counseled on age appropriate medical preventative services, including age appropriate cancer screenings, over all nutritional health, need for a consistent exercise regimen and an over all push towards maintaining a vigorous and active lifestyle.  Counseled on age appropriate vaccines and discussed upcoming health care needs based on age/gender.  Spent time with patient counseling on need for a good patient/doctor relationship moving forward.  Discussed use of common OTC medications and supplements.  Discussed  common dietary aids and use of caffeine and the need for good sleep hygiene and stress management.  - CBC auto differential; Future  - Comprehensive metabolic panel; Future  - Hemoglobin A1C; Future  - TSH; Future  - Lipid Panel; Future    2. Acute otalgia, left  - Ambulatory referral/consult to ENT; Future    3. Nasal congestion  - Ambulatory referral/consult to ENT; Future    4. Type 2 diabetes mellitus with retinopathy, without long-term current use of insulin, macular edema presence unspecified, unspecified laterality, unspecified retinopathy severity  - Repeat labs  - Hemoglobin A1C; Future  - TSH; Future  - Ambulatory referral/consult to Diabetes Education; Future    5. Pure hypercholesterolemia  - TSH; Future  - Lipid Panel; Future    6. Chronic kidney disease (CKD), stage III (moderate)  - CBC auto differential; Future  - Comprehensive metabolic panel; Future  - TSH; Future    7. Impacted cerumen of left ear  - s/p removal with curette on exam today    8. Varicose veins of both lower extremities, unspecified whether complicated  - Ambulatory referral/consult to Vascular Surgery; Future        RTC in 6 months     Gretchen Pena MD  06/01/2020 3:02 PM        No follow-ups on file.

## 2020-06-03 ENCOUNTER — PATIENT OUTREACH (OUTPATIENT)
Dept: ADMINISTRATIVE | Facility: OTHER | Age: 85
End: 2020-06-03

## 2020-06-04 ENCOUNTER — CLINICAL SUPPORT (OUTPATIENT)
Dept: DIABETES | Facility: CLINIC | Age: 85
End: 2020-06-04
Payer: MEDICARE

## 2020-06-04 DIAGNOSIS — E11.319 TYPE 2 DIABETES MELLITUS WITH RETINOPATHY, WITHOUT LONG-TERM CURRENT USE OF INSULIN, MACULAR EDEMA PRESENCE UNSPECIFIED, UNSPECIFIED LATERALITY, UNSPECIFIED RETINOPATHY SEVERITY: ICD-10-CM

## 2020-06-04 DIAGNOSIS — E11.42 TYPE 2 DIABETES MELLITUS WITH DIABETIC POLYNEUROPATHY, WITHOUT LONG-TERM CURRENT USE OF INSULIN: Primary | ICD-10-CM

## 2020-06-04 PROCEDURE — G0108 DIAB MANAGE TRN  PER INDIV: HCPCS | Mod: HCNC,S$GLB,, | Performed by: DIETITIAN, REGISTERED

## 2020-06-04 PROCEDURE — G0108 PR DIAB MANAGE TRN  PER INDIV: ICD-10-PCS | Mod: HCNC,S$GLB,, | Performed by: DIETITIAN, REGISTERED

## 2020-06-04 NOTE — PROGRESS NOTES
Diabetes Education  Author: Jessica Abreu RD  Date: 6/4/2020    Diabetes Care Management Summary    Diabetes Education Record Assessment/Progress: Initial  Current Diabetes Risk Level: Low         Diabetes Type  Diabetes Type : Type II    Diabetes History  Diabetes Diagnosis: >10 years  Current Treatment: Diet, Oral Medication  Reviewed Problem List with Patient: Yes    Health Maintenance was reviewed today with patient. Discussed with patient importance of routine eye exams, foot exams/foot care, blood work (i.e.: A1c, microalbumin, and lipid), dental visits, yearly flu vaccine, and pneumonia vaccine as indicated by PCP. Patient verbalized understanding.     Health Maintenance Topics with due status: Not Due       Topic Last Completion Date    Aspirin/Antiplatelet Therapy 06/01/2020     Health Maintenance Due   Topic Date Due    TETANUS VACCINE  09/07/2015    Lipid Panel  03/25/2020    Hemoglobin A1c  04/03/2020       Nutrition  Meal Planning: 3 meals per day, artificial sweeteners, eats out seldom, water  What type of beverages do you drink?: milk    Monitoring   Self Monitoring : several times per week  Blood Glucose Logs: No  In the last month, how often have you had a low blood sugar reaction?: never  Can you tell when your blood sugar is too high?: no    Exercise   Exercise Type: walking, exercise class  Intensity: Low  Frequency: 3-5 Times per week  Duration: 30 min    Current Diabetes Treatment   Current Treatment: Diet, Oral Medication    Social History  Preferred Learning Method: Face to Face  Primary Support: Self  Educational Level: High School  Occupation: retired  Smoking Status: Never a Smoker  Alcohol Use: Never                                Barriers to Change  Barriers to Change: None  Learning Challenges : None    Readiness to Learn   Readiness to Learn : Eager    Cultural Influences  Cultural Influences: No    Diabetes Education Assessment/Progress  Diabetes Disease Process (diabetes disease  "process and treatment options): Discussion, Instructed, Written Materials Provided, Individual Session, Demonstrates Understanding/Competency(verbalizes/demonstrates)  Nutrition (Incorporating nutritional management into one's lifestyle): Individual Session, Written Materials Provided, Instructed, Discussion, Demonstrates Understanding/Competency (verbalizes/demonstrates  -pt states she has been trying to reduce carbs in diet but does acknowledge larger than rec intake of milk valarie in porridge  --Reviewed carb vs.non-carb , eating 3 meals daily (30-45 g carb/meal), spacing meals 4-5 hours apart, choosing appropriate sources of Carb with acceptable serving sizes, label reading and using the plate method of meal planning.  -Rec'd limiting  day snacks to mostly 0-5g Carb or a  bedtime Carb snack to 0- 15g Carb  -Discussed keeping Na intake to 2000mg/day; tips sheet discussed and provided  -Reviewed Renal Diabetic Grocery List and suggested pt choose foods on list as primary meal components    Physical Activity (incorporating physical activity into one's lifestyle): Discussion, Individual Session, Written Materials Provided, Instructed, Demonstrates Understanding/Competency (verbalizes/demonstrates)  -pt states she does not want to "look her age of 89" so keeps quite active; will be returning to gym when re-opend for her group exercise classes  Suggested 3 x/wk for 30+ min duration    Medications (states correct name, dose, onset, peak, duration, side effects & timing of meds): Discussion, Instructed, Written Materials Provided, Individual Session, Demonstrates Understanding/Competency(verbalizes/demonstrates)  Monitoring (monitoring blood glucose/other parameters & using results): Individual Session, Written Materials Provided, Demonstration, Instructed, Discussion, Demonstrates Understanding/Competency (verbalizes/demonstrates), Vide  -pt had questions regarding the accuracy of her testing equipment and any updates on " testing tools. Discussed freestyle Randal and pt voiced interest in perhaps looking into CGM.  -pt currently checks Bg often, 1-2x daily,without problem  -Educated regarding purpose of monitoring blood sugar. Discussed goal Blood sugars for different times of day and in relation to meals. Provided patient with blood sugar logs to return to [facility], upon appointment time. Patient verbalizes understanding of received information/education.     Acute Complications (preventing, detecting, and treating acute complications): Individual Session, Written Materials Provided, Instructed, Demonstrates Understanding/Competency (verbalizes/demonstrates), Discussion  Reviewed s/s, causes, and treatment of hyperglycemia and hypoglycemia     Chronic Complications (preventing, detecting, and treating chronic complications): Individual Session, Written Materials Provided, Instructed, Demonstrates Understanding/Competency (verbalizes/demonstrates), Discussion  -Reviewed annual diabetes care schedule and patient priorities. Patient verbalizes understanding of received information/education.    Clinical (diabetes, other pertinent medical history, and relevant comorbidities reviewed during visit): Individual Session, Written Materials Provided, Instructed, Demonstrates Understanding/Competency (verbalizes/demonstrates), Discussion  -Discussed role of DM in Chronic Kidney Disease and provided educational materials and instruction on proper food choices and meal planning to assist w both    Cognitive (knowledge of self-management skills, functional health literacy): Individual Session, Written Materials Provided, Instructed, Demonstrates Understanding/Competency (verbalizes/demonstrates), Discussion  -verbalized need to stay aware and attentive to self-care    Psychosocial (emotional response to diabetes): Individual Session, Discussion  Diabetes Distress and Support Systems: Discussion, Individual Session  Behavioral (readiness for  change, lifestyle practices, self-care behaviors): Discussion, Individual Session  -Appears  motivated to make recommended changes    Goals  Patient has selected/evaluated goals during today's session: Yes, selected  Healthy Eating: Set(reduces sodium and carbs in meals' distribute carbs evenly inot 3 meals/day with no more than 45 g carb/meal)  Start Date: 06/04/20  Target Date: 07/04/20     Diabetes Care Plan/Intervention  Education Plan/Intervention: Individual Follow-Up DSMT(annual or as needed)    Diabetes Meal Plan  Restrictions: Low Sodium, Restricted Carbohydrate  Carbohydrate Per Meal: 30-45g  Carbohydrate Per Snack : 15-20g, 7-15g    Today's Self-Management Care Plan was developed with the patient's input and is based on barriers identified during today's assessment.    The long and short-term goals in the care plan were written with the patient/caregiver's input. The patient has agreed to work toward these goals to improve her overall diabetes control.      The patient received a copy of today's self-management plan and verbalized understanding of the care plan, goals, and all of today's instructions.      The patient was encouraged to communicate with her physician and care team regarding her condition(s) and treatment.  I provided the patient with my contact information today and encouraged her to contact me via phone or patient portal as needed.     Education Units of Time   Time Spent: 60 min

## 2020-06-05 ENCOUNTER — LAB VISIT (OUTPATIENT)
Dept: LAB | Facility: HOSPITAL | Age: 85
End: 2020-06-05
Attending: INTERNAL MEDICINE
Payer: MEDICARE

## 2020-06-05 DIAGNOSIS — Z00.00 ANNUAL PHYSICAL EXAM: ICD-10-CM

## 2020-06-05 DIAGNOSIS — E78.00 PURE HYPERCHOLESTEROLEMIA: ICD-10-CM

## 2020-06-05 DIAGNOSIS — N18.30 CHRONIC KIDNEY DISEASE (CKD), STAGE III (MODERATE): ICD-10-CM

## 2020-06-05 DIAGNOSIS — E11.319 TYPE 2 DIABETES MELLITUS WITH RETINOPATHY, WITHOUT LONG-TERM CURRENT USE OF INSULIN, MACULAR EDEMA PRESENCE UNSPECIFIED, UNSPECIFIED LATERALITY, UNSPECIFIED RETINOPATHY SEVERITY: ICD-10-CM

## 2020-06-05 LAB
ALBUMIN SERPL BCP-MCNC: 4.2 G/DL (ref 3.5–5.2)
ALP SERPL-CCNC: 80 U/L (ref 55–135)
ALT SERPL W/O P-5'-P-CCNC: 17 U/L (ref 10–44)
ANION GAP SERPL CALC-SCNC: 9 MMOL/L (ref 8–16)
AST SERPL-CCNC: 18 U/L (ref 10–40)
BASOPHILS # BLD AUTO: 0.03 K/UL (ref 0–0.2)
BASOPHILS NFR BLD: 0.4 % (ref 0–1.9)
BILIRUB SERPL-MCNC: 0.6 MG/DL (ref 0.1–1)
BUN SERPL-MCNC: 25 MG/DL (ref 10–30)
CALCIUM SERPL-MCNC: 10.5 MG/DL (ref 8.7–10.5)
CHLORIDE SERPL-SCNC: 109 MMOL/L (ref 95–110)
CHOLEST SERPL-MCNC: 125 MG/DL (ref 120–199)
CHOLEST/HDLC SERPL: 2.7 {RATIO} (ref 2–5)
CO2 SERPL-SCNC: 23 MMOL/L (ref 23–29)
CREAT SERPL-MCNC: 1.3 MG/DL (ref 0.5–1.4)
DIFFERENTIAL METHOD: NORMAL
EOSINOPHIL # BLD AUTO: 0.1 K/UL (ref 0–0.5)
EOSINOPHIL NFR BLD: 1.6 % (ref 0–8)
ERYTHROCYTE [DISTWIDTH] IN BLOOD BY AUTOMATED COUNT: 13.2 % (ref 11.5–14.5)
EST. GFR  (AFRICAN AMERICAN): 41 ML/MIN/1.73 M^2
EST. GFR  (NON AFRICAN AMERICAN): 36 ML/MIN/1.73 M^2
GLUCOSE SERPL-MCNC: 201 MG/DL (ref 70–110)
HCT VFR BLD AUTO: 38.4 % (ref 37–48.5)
HDLC SERPL-MCNC: 46 MG/DL (ref 40–75)
HDLC SERPL: 36.8 % (ref 20–50)
HGB BLD-MCNC: 12.5 G/DL (ref 12–16)
IMM GRANULOCYTES # BLD AUTO: 0.02 K/UL (ref 0–0.04)
IMM GRANULOCYTES NFR BLD AUTO: 0.3 % (ref 0–0.5)
LDLC SERPL CALC-MCNC: 47.4 MG/DL (ref 63–159)
LYMPHOCYTES # BLD AUTO: 1.7 K/UL (ref 1–4.8)
LYMPHOCYTES NFR BLD: 25.1 % (ref 18–48)
MCH RBC QN AUTO: 29.7 PG (ref 27–31)
MCHC RBC AUTO-ENTMCNC: 32.6 G/DL (ref 32–36)
MCV RBC AUTO: 91 FL (ref 82–98)
MONOCYTES # BLD AUTO: 0.5 K/UL (ref 0.3–1)
MONOCYTES NFR BLD: 7.1 % (ref 4–15)
NEUTROPHILS # BLD AUTO: 4.4 K/UL (ref 1.8–7.7)
NEUTROPHILS NFR BLD: 65.5 % (ref 38–73)
NONHDLC SERPL-MCNC: 79 MG/DL
NRBC BLD-RTO: 0 /100 WBC
PLATELET # BLD AUTO: 182 K/UL (ref 150–350)
PMV BLD AUTO: 11.8 FL (ref 9.2–12.9)
POTASSIUM SERPL-SCNC: 4.8 MMOL/L (ref 3.5–5.1)
PROT SERPL-MCNC: 7.2 G/DL (ref 6–8.4)
RBC # BLD AUTO: 4.21 M/UL (ref 4–5.4)
SODIUM SERPL-SCNC: 141 MMOL/L (ref 136–145)
T4 FREE SERPL-MCNC: 0.91 NG/DL (ref 0.71–1.51)
TRIGL SERPL-MCNC: 158 MG/DL (ref 30–150)
TSH SERPL DL<=0.005 MIU/L-ACNC: 0.35 UIU/ML (ref 0.4–4)
WBC # BLD AUTO: 6.73 K/UL (ref 3.9–12.7)

## 2020-06-05 PROCEDURE — 83036 HEMOGLOBIN GLYCOSYLATED A1C: CPT | Mod: HCNC

## 2020-06-05 PROCEDURE — 80053 COMPREHEN METABOLIC PANEL: CPT | Mod: HCNC

## 2020-06-05 PROCEDURE — 85025 COMPLETE CBC W/AUTO DIFF WBC: CPT | Mod: HCNC

## 2020-06-05 PROCEDURE — 84443 ASSAY THYROID STIM HORMONE: CPT | Mod: HCNC

## 2020-06-05 PROCEDURE — 80061 LIPID PANEL: CPT | Mod: HCNC

## 2020-06-05 PROCEDURE — 36415 COLL VENOUS BLD VENIPUNCTURE: CPT | Mod: HCNC,PN

## 2020-06-05 PROCEDURE — 84439 ASSAY OF FREE THYROXINE: CPT | Mod: HCNC

## 2020-06-06 LAB
ESTIMATED AVG GLUCOSE: 183 MG/DL (ref 68–131)
HBA1C MFR BLD HPLC: 8 % (ref 4–5.6)

## 2020-06-08 ENCOUNTER — TELEPHONE (OUTPATIENT)
Dept: FAMILY MEDICINE | Facility: CLINIC | Age: 85
End: 2020-06-08

## 2020-06-08 DIAGNOSIS — R79.89 ABNORMAL TSH: ICD-10-CM

## 2020-06-08 DIAGNOSIS — E11.22 TYPE 2 DIABETES MELLITUS WITH STAGE 3 CHRONIC KIDNEY DISEASE, WITHOUT LONG-TERM CURRENT USE OF INSULIN: Primary | ICD-10-CM

## 2020-06-08 DIAGNOSIS — N18.30 TYPE 2 DIABETES MELLITUS WITH STAGE 3 CHRONIC KIDNEY DISEASE, WITHOUT LONG-TERM CURRENT USE OF INSULIN: Primary | ICD-10-CM

## 2020-06-08 DIAGNOSIS — F41.9 ANXIETY DISORDER, UNSPECIFIED: ICD-10-CM

## 2020-06-08 RX ORDER — METFORMIN HYDROCHLORIDE 750 MG/1
750 TABLET, EXTENDED RELEASE ORAL
Qty: 90 TABLET | Refills: 0 | Status: SHIPPED | OUTPATIENT
Start: 2020-06-08 | End: 2020-08-28

## 2020-06-09 ENCOUNTER — TELEPHONE (OUTPATIENT)
Dept: FAMILY MEDICINE | Facility: CLINIC | Age: 85
End: 2020-06-09

## 2020-06-09 NOTE — TELEPHONE ENCOUNTER
----- Message from Jeannine Suraj sent at 6/9/2020  9:19 AM CDT -----  Type:  Patient Returning Call    Who Called: pt     Who Left Message for Patient: Rosemary    Does the patient know what this is regarding?:     Would the patient rather a call back or a response via [facility]? Call back     Best Call Back Number: [phone]    Additional Information:

## 2020-06-09 NOTE — TELEPHONE ENCOUNTER
Patient informed of lab results via Dr. Pena's letter.  Patient verbalized understanding.    Patient also wanted to know she was denied an appointment with Dr. Love for varicose veins because he does not want to do surgery.  Patient stated the veins are painful and she needs something done and will call back to try to schedule another office visit.  Will call back to inform Dr. Pena of the outcome of the phone call.

## 2020-06-11 ENCOUNTER — TELEPHONE (OUTPATIENT)
Dept: OTOLARYNGOLOGY | Facility: CLINIC | Age: 85
End: 2020-06-11

## 2020-06-11 DIAGNOSIS — H69.90 DYSFUNCTION OF EUSTACHIAN TUBE, UNSPECIFIED LATERALITY: ICD-10-CM

## 2020-06-11 DIAGNOSIS — R09.81 NASAL CONGESTION: Primary | ICD-10-CM

## 2020-06-11 NOTE — TELEPHONE ENCOUNTER
Called Patient to inform her that she will need a Covid Test prior to her appointment on 7/13/20.  I explained to her that I will schedule at the [facility] in [place] and will mail out her appts with dates and times for Covid Test and with Dr. Pop. Patient has full understanding.

## 2020-07-09 ENCOUNTER — LAB VISIT (OUTPATIENT)
Dept: LAB | Facility: HOSPITAL | Age: 85
End: 2020-07-09
Attending: INTERNAL MEDICINE
Payer: MEDICARE

## 2020-07-09 ENCOUNTER — LAB VISIT (OUTPATIENT)
Dept: FAMILY MEDICINE | Facility: CLINIC | Age: 85
End: 2020-07-09
Payer: MEDICARE

## 2020-07-09 DIAGNOSIS — R79.89 ABNORMAL TSH: ICD-10-CM

## 2020-07-09 DIAGNOSIS — F41.9 ANXIETY DISORDER, UNSPECIFIED: ICD-10-CM

## 2020-07-09 DIAGNOSIS — R09.81 NASAL CONGESTION: ICD-10-CM

## 2020-07-09 DIAGNOSIS — H69.90 DYSFUNCTION OF EUSTACHIAN TUBE, UNSPECIFIED LATERALITY: ICD-10-CM

## 2020-07-09 LAB
T4 FREE SERPL-MCNC: 0.87 NG/DL (ref 0.71–1.51)
TSH SERPL DL<=0.005 MIU/L-ACNC: 0.26 UIU/ML (ref 0.4–4)

## 2020-07-09 PROCEDURE — 36415 COLL VENOUS BLD VENIPUNCTURE: CPT | Mod: HCNC,PO

## 2020-07-09 PROCEDURE — 84443 ASSAY THYROID STIM HORMONE: CPT | Mod: HCNC

## 2020-07-09 PROCEDURE — 84439 ASSAY OF FREE THYROXINE: CPT | Mod: HCNC

## 2020-07-09 PROCEDURE — U0003 INFECTIOUS AGENT DETECTION BY NUCLEIC ACID (DNA OR RNA); SEVERE ACUTE RESPIRATORY SYNDROME CORONAVIRUS 2 (SARS-COV-2) (CORONAVIRUS DISEASE [COVID-19]), AMPLIFIED PROBE TECHNIQUE, MAKING USE OF HIGH THROUGHPUT TECHNOLOGIES AS DESCRIBED BY CMS-2020-01-R: HCPCS | Mod: HCNC

## 2020-07-11 ENCOUNTER — PATIENT OUTREACH (OUTPATIENT)
Dept: ADMINISTRATIVE | Facility: OTHER | Age: 85
End: 2020-07-11

## 2020-07-12 LAB — SARS-COV-2 RNA RESP QL NAA+PROBE: NOT DETECTED

## 2020-07-12 NOTE — PROGRESS NOTES
Requested updates within Care Everywhere.  Patient's chart was reviewed for overdue ABIOLA topics.  Immunizations reconciled.

## 2020-07-13 ENCOUNTER — OFFICE VISIT (OUTPATIENT)
Dept: OTOLARYNGOLOGY | Facility: CLINIC | Age: 85
End: 2020-07-13
Payer: MEDICARE

## 2020-07-13 ENCOUNTER — CLINICAL SUPPORT (OUTPATIENT)
Dept: AUDIOLOGY | Facility: CLINIC | Age: 85
End: 2020-07-13
Payer: MEDICARE

## 2020-07-13 VITALS
SYSTOLIC BLOOD PRESSURE: 130 MMHG | DIASTOLIC BLOOD PRESSURE: 40 MMHG | BODY MASS INDEX: 27.16 KG/M2 | WEIGHT: 153.31 LBS | HEIGHT: 63 IN | TEMPERATURE: 97 F

## 2020-07-13 DIAGNOSIS — J30.9 ALLERGIC RHINITIS, UNSPECIFIED SEASONALITY, UNSPECIFIED TRIGGER: ICD-10-CM

## 2020-07-13 DIAGNOSIS — R09.81 NASAL CONGESTION: ICD-10-CM

## 2020-07-13 DIAGNOSIS — R42 DIZZINESS: ICD-10-CM

## 2020-07-13 DIAGNOSIS — H92.09 UNILATERAL OTALGIA: ICD-10-CM

## 2020-07-13 DIAGNOSIS — H90.3 SENSORINEURAL HEARING LOSS, BILATERAL: Primary | ICD-10-CM

## 2020-07-13 DIAGNOSIS — H81.11 BPPV (BENIGN PAROXYSMAL POSITIONAL VERTIGO), RIGHT: ICD-10-CM

## 2020-07-13 DIAGNOSIS — E05.90 HYPERTHYROIDISM: ICD-10-CM

## 2020-07-13 DIAGNOSIS — M26.621 ARTHRALGIA OF RIGHT TEMPOROMANDIBULAR JOINT: ICD-10-CM

## 2020-07-13 DIAGNOSIS — H90.3 SENSORINEURAL HEARING LOSS (SNHL) OF BOTH EARS: Primary | ICD-10-CM

## 2020-07-13 PROCEDURE — 1126F AMNT PAIN NOTED NONE PRSNT: CPT | Mod: S$GLB,,, | Performed by: OTOLARYNGOLOGY

## 2020-07-13 PROCEDURE — 92550 TYMPANOMETRY & REFLEX THRESH: CPT | Mod: S$GLB,,, | Performed by: AUDIOLOGIST

## 2020-07-13 PROCEDURE — 92557 COMPREHENSIVE HEARING TEST: CPT | Mod: S$GLB,,, | Performed by: AUDIOLOGIST

## 2020-07-13 PROCEDURE — 92550 PR TYMPANOMETRY AND REFLEX THRESHOLD MEASUREMENTS: ICD-10-PCS | Mod: S$GLB,,, | Performed by: AUDIOLOGIST

## 2020-07-13 PROCEDURE — 99204 OFFICE O/P NEW MOD 45 MIN: CPT | Mod: 25,S$GLB,, | Performed by: OTOLARYNGOLOGY

## 2020-07-13 PROCEDURE — 1159F MED LIST DOCD IN RCRD: CPT | Mod: S$GLB,,, | Performed by: OTOLARYNGOLOGY

## 2020-07-13 PROCEDURE — 31575 DIAGNOSTIC LARYNGOSCOPY: CPT | Mod: S$GLB,,, | Performed by: OTOLARYNGOLOGY

## 2020-07-13 PROCEDURE — 99204 PR OFFICE/OUTPT VISIT, NEW, LEVL IV, 45-59 MIN: ICD-10-PCS | Mod: 25,S$GLB,, | Performed by: OTOLARYNGOLOGY

## 2020-07-13 PROCEDURE — 1101F PR PT FALLS ASSESS DOC 0-1 FALLS W/OUT INJ PAST YR: ICD-10-PCS | Mod: CPTII,S$GLB,, | Performed by: OTOLARYNGOLOGY

## 2020-07-13 PROCEDURE — 92557 PR COMPREHENSIVE HEARING TEST: ICD-10-PCS | Mod: S$GLB,,, | Performed by: AUDIOLOGIST

## 2020-07-13 PROCEDURE — 1101F PT FALLS ASSESS-DOCD LE1/YR: CPT | Mod: CPTII,S$GLB,, | Performed by: OTOLARYNGOLOGY

## 2020-07-13 PROCEDURE — 31575 PR LARYNGOSCOPY, FLEXIBLE; DIAGNOSTIC: ICD-10-PCS | Mod: S$GLB,,, | Performed by: OTOLARYNGOLOGY

## 2020-07-13 PROCEDURE — 1126F PR PAIN SEVERITY QUANTIFIED, NO PAIN PRESENT: ICD-10-PCS | Mod: S$GLB,,, | Performed by: OTOLARYNGOLOGY

## 2020-07-13 PROCEDURE — 1159F PR MEDICATION LIST DOCUMENTED IN MEDICAL RECORD: ICD-10-PCS | Mod: S$GLB,,, | Performed by: OTOLARYNGOLOGY

## 2020-07-13 RX ORDER — FLUTICASONE PROPIONATE 50 MCG
1 SPRAY, SUSPENSION (ML) NASAL 2 TIMES DAILY
Qty: 18.2 ML | Refills: 3 | Status: SHIPPED | OUTPATIENT
Start: 2020-07-13 | End: 2020-08-04 | Stop reason: SDUPTHER

## 2020-07-13 RX ORDER — AZELASTINE 1 MG/ML
1 SPRAY, METERED NASAL 2 TIMES DAILY
Qty: 30 ML | Refills: 3 | Status: SHIPPED | OUTPATIENT
Start: 2020-07-13 | End: 2020-08-04 | Stop reason: SDUPTHER

## 2020-07-13 NOTE — PATIENT INSTRUCTIONS
"Information and instructions from your visit with me today:    · Start using the following medication nasal sprays:   · Fluticasone spray:    This medication is a steroid spray. It stays within the nose and does not have absorption into the body that leads to side effects that one has with oral steroid medication. Fluticasone nasal spray is the same as the Flonase brand nasal spray. Discuss with your pharmacist if the price is lower over the counter or with a prescription ( this varies depending on insurance). The medication that is over the counter is the same as the prescription medication. Use this medication as instructed on the prescription, 2 sprays on each side of your nose twice daily.     · Azelastine  spray:  This medication is an antihistamine used to treat nasal symptoms of allergy, which works specifically in the nose unlike antihistamine pills which have more of an effect on the whole body. Use this medication as instructed on the prescription, 1 spray on each side of your nose twice daily.     Additional instructions for medication sprays  Place the tip of the medication bottle in your nose and aim slightly up and out on each side to get medication high and deep into your nose and sinuses, and not have it all deposit in the very front of your nose. Aim the tip of the nozzle towards the outer corner of your eye . You can imagine aiming towards the back of your eyeball on each side for this, as opposed to straight back to the center of your nose and head.     You need to use this medication every day regardless of symptoms, as it takes time ( a few weeks) to work and get the benefits. It does not work on an "as needed" basis like taking a decongestant. If your symptoms only occur in a particular season, then the medication can be used seasonally instead of year long. For seasonal symptoms, you should start using the spray twice daily a month before when you normally have symptoms ( for example, if " symptoms start in August, should start at the end of June).     · Start nasal irrigations with saline solution:    SALINE SINUS RINSE ([employer] brand): You should do a full bottle, half on one side of your nose and half on the other, 1-2 times per day (or more if able to, you cannot do this too much). Follow the instructions on the box: mix the salt packet with clean water (bottle, previously boiled, distilled, etc -- not tap water) to the line on the bottle to make the irrigation.     NASAL SALINE SPRAY ( 0.9%) There are several different brands found in the cold and flu aisle of the pharmacy. You can also use simply saline or other brand of saline spray that delivers saline by gentle mist if you have difficulty or discomfort with neilmed rinse. Always rinse your nose with saline prior to using medication sprays and wait a couple of hours before using again.      · Do not use nasal decongestant sprays such as Afrin or similar products.  This can cause long term physical nasal addiction. Afrin should only be used if having nose bleeds and should not be used for more than 2-3 days in a row . It is a not a medication that should be used for a long period of time.     It was nice meeting you today, and I look forward to helping you feel better soon. Please don't hesitate to call if you have any other questions or concerns, or if I can be of any assistance in the meantime.      Janeth Pop MD    [facility]    Phone  [phone]    Fax      [phone]        Janeth Pop MD  Otorhinolaryngology

## 2020-07-13 NOTE — PROGRESS NOTES
OTOLARYNGOLOGY CLINIC NOTE  Date:  07/13/2020     Chief complaint:  Chief Complaint   Patient presents with    Ear Fullness    Dizziness    Cerumen Impaction       History of Present Illness  Tonya Marquez is a 89 y.o. female  presenting today for a new evaluation and treatment of several ent issues. She was referred here by her PCP Dr. Pena for otalgia. On exam by Dr. Pena in 6-2020, left cerumen impaction was present and removed. 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 She had been to [facility] for eval. many years ago and was recommended to have jaw joint surgery. Has issues when chewing due to discomfort, chews on left side. Does not wear a mouth guard at night.     She has also been having problems with  nasal congestion. She reports that she was in [place] during [employer] fog as a teen and reports being told she may have future issues with her lungs. Occasionally she needs antibitoics for congestion and cough . Even after treatment she still will have secretions/ postnasal drainage.had to use robitussin for symptom relief about 7 months ago. Last time needed abx 5 years ago. Frequent sneezing (3-4 times when attacks happen).  No rhinorrhea with meals. Winter months has worse nasal congestion. +postnasal drainage and rhinorrhea currently . No allergy testing that she recalls. Occasionally takes benadryl. Has been going on for about 3-4 years, getting worse, worse when around smoke, not with temperature change.   No heartburn. No hoarseness.  Difficulty with swallowing pills, no other issues with swallowing.    She also reports problems with feeling dizzy. Dizziness started 2 years ago. Started doing yoga to help with this (chair yoga) this has improved balance a little bit.   Episodes happen randomly. No issues when rolls over in bed. Gets dizzy with sitting to standing. No room spinning sensation. Feels  woozy, lasts for a few seconds. Once stands up and reorients self then does ok . No issues with falling. Is able to drive. Wears eyeglasses. No numbness in legs. Has seen vascular surgery and cardiology but she states she did not mention the dizziness to them  Hearing has decreased gradually. No tinnitus. No otorrhea . + itching of ears. No eczma no scalp dandruff.   Going back to [place] next year.     Past Medical History  Diagnosis Date    Coronary artery disease with MI in 2016 and coronary stenting     Chronic venous insufficiency and varicose veins     Diabetes mellitus type II     Hyperlipidemia     CKD stage III     gout     Hypertension         Past Surgical History  Procedure Laterality Date    ADENOIDECTOMY      CATARACT EXTRACTION, BILATERAL      CORONARY STENT PLACEMENT  08/21/2016        Medications  Current Outpatient Medications on File Prior to Visit   Medication Sig Dispense Refill    ascorbic acid, vitamin C, (VITAMIN C) 100 MG tablet Take 500 mg by mouth once daily.      aspirin (ECOTRIN) 81 MG EC tablet Take 81 mg by mouth once daily.      atorvastatin (LIPITOR) 40 MG tablet TAKE 1 TABLET (40 MG TOTAL) BY MOUTH ONCE DAILY. 90 tablet 0    clopidogrel (PLAVIX) 75 mg tablet NO LONGER TAKING 7-13-20 Take 1 tablet (75 mg total) by mouth once daily. 90 tablet 3    colchicine 0.6 mg tablet Take 2 tablets by mouth, then one hour later take 1 additional tablet 3 tablet 0    enalapril (VASOTEC) 20 MG tablet TAKE 1 TABLET EVERY DAY 90 tablet 0    glipiZIDE (GLUCOTROL) 2.5 MG TR24 Take 1 tablet (2.5 mg total) by mouth daily with breakfast. 90 tablet 1    ibuprofen (ADVIL,MOTRIN) 400 MG tablet       isosorbide mononitrate (IMDUR) 60 MG 24 hr tablet Take 1 tablet (60 mg total) by mouth once daily. 90 tablet 3    levocetirizine (XYZAL) 5 MG tablet TAKE 1/2 TABLET BY MOUTH EVERY OTHER DAY 22 tablet 1    melatonin-pyridoxal phos, B6, (MELATONIN-B6, PYRIDOXAL PHOS,) 2.5 mg- 338 mcg Subl Place  "under the tongue.      metFORMIN (GLUCOPHAGE-XR) 750 MG XR 24hr tablet Take 1 tablet (750 mg total) by mouth daily with breakfast. 90 tablet 0    metoprolol succinate (TOPROL-XL) 25 MG 24 hr tablet TAKE 1 TABLET EVERY DAY 90 tablet 0    nitroGLYCERIN (NITROSTAT) 0.3 MG SL tablet Place 1 tablet (0.3 mg total) under the tongue every 5 (five) minutes as needed for Chest pain. 25 tablet 1    senna-docusate 8.6-50 mg (PERICOLACE) 8.6-50 mg per tablet Take 1 tablet by mouth once daily.      azelastine (ASTELIN) 137 mcg (0.1 %) nasal spray 1 spray (137 mcg total) by Nasal route 2 (two) times daily. 30 mL 0         Review of Systems-see hpi as well  Review of Systems   Cardiovascular: Positive for chest pain (has been taking nitro more frequently because of some stress but dizziness started before that). Negative for palpitations.   Gastrointestinal: Negative for heartburn.        Social History   reports that she has never smoked. She has never used smokeless tobacco.     Family History  History reviewed. No pertinent family history.     Physical Exam   Vitals:    07/13/20 0917   BP: (!) 130/40   Temp: 97.1 °F (36.2 °C)    Body mass index is 27.16 kg/m².  Weight: 69.6 kg (153 lb 5.3 oz)   Height: 5' 3" (160 cm)     GENERAL: alert, no acute distress.  HEAD: normocephalic.   SKIN: no lesions of skin of head and neck area.  EYES: lids and lashes normal. Pupils equal and reactive to light. Extraocular motions intact. No proptosis  EARS: external ear without lesion, normal pinna shape and position.  External auditory canal with normal cerumen, tympanic membrane fully visible, no perforation , no retraction. No middle ear effusion. Ossicles intact.   NOSE: external nose without abnormality, see scope  ORAL CAVITY/OROPHARYNX: no mass or lesion of gingiva/buccal mucosa/floor of mouth/tongue. tongue midline and mobile. No fasciculations. Floor of mouth and base of tongue soft to palpation. Symmetric palate rise. Uvula midline. "   NECK: trachea midline. No discrete palpable thyroid nodule. No parotid mass nor tenderness. No submandibular gland mass nor tenderness. No scars.  LYMPH NODES:No cervical lymphadenopathy.  RESPIRATORY: no stridor, no stertor. Voice normal. Respirations nonlabored.  NEURO: alert, responds to questions appropriately.   Cranial nerve exam as indicated in above sections and additionally showed intact facial sensation to light touch bilaterally in V1,V2 and V3. Facial movement symmetric with good eye closure and symmetric smile. No dysmetria, no  Dysdiadochokenisia. Able to perform tandem gait with slight difficulty but overall normal  PSYCH:mood appropriate    PROCEDURE NOTE  NAME OF PROCEDURE: Flexible Laryngoscopy, diagnostic  INDICATIONS:postnasal drainage, unilateral otalgia  FINDINGS: no evidence of malignancy, no evidence of LPR. Findings consistent with allergic rhinitis    Consent: After procedure was explained in detail and all questions answered, verbal consent was obtained for performing flexible laryngoscopy.  Anesthesia: topical 4% lidocaine  and neosynephrine  Procedure: With patient in seated position, the scope was inserted into the bilateral  nasal passageway and then advanced atraumatically on the right side into the nasopharynx to examine the following structures  Nasal cavity: turbinates boggy bilaterally , no significant deviation of nasal septum  Nasopharynx: no mass or lesion noted in nasopharynx.   Oropharynx: base of tongue without  mass or ulceration. Lingual tonsils normal in appearance  Hypopharynx: posterior pharyngeal wall without mass or lesion. No pooling of secretions. Pyriform sinuses visible without mass or lesion  Larynx: epiglottis normal without lesion. False vocal folds without edema/erythema/lesion. True vocal folds mobile and without lesion. No interarytenoid edema nor erythema . Postcricoid region without edema or lesion  Subglottis: visualized portion of subglottis normal in  appearance    After examination performed, the scope was removed atraumatically . The patient tolerated the procedure well.    AUDIOLOGY RESULTS  Audiometric evaluation including audiogram, tympanometry, acoustic reflexes, and speech discrimination which was performed 7-13-20  was personally reviewed and interpreted.  Notable findings on the audiogram were symmetric mild sloping to moderate sensorineural hearing loss.  Tympanometry revealed Type A tympanogram bilaterally. Speech discrimination was 68%  on the left, and 56%  on the right.       Report of the audiologist performing this audiometric testing was also reviewed -emily hallpike to the right was positive and epley maneuver performed    Imaging:  The patient does not have any pertinent and/or recent imaging of the head and neck.     Labs:  CBC  Lab 06/05/20  0828   WBC 6.73   Hemoglobin 12.5   Hematocrit 38.4   Mean Corpuscular Volume 91   Platelets 182     BMP  Lab 06/05/20  0828   Glucose 201 H   Sodium 141   Potassium 4.8   Chloride 109   CO2 23   BUN, Bld 25   Creatinine 1.3   Calcium 10.5   Magnesium  --      TSH low at 0.262 ( was also low 1 month ago at 0.348) ; free T4 normal at 0.87  Assessment  1. Arthralgia of right temporomandibular joint    2. Nasal congestion    3. Allergic rhinitis, unspecified seasonality, unspecified trigger    4. Sensorineural hearing loss (SNHL) of both ears    5. Dizziness    6. hyperthyroid       Plan:  Discussed plan of care with patient in detail and all questions answered. Patient reported understanding of plan of care.   Sensorineural hearing loss: medically cleared for hearing aids. She is interested in getting amplification, will set up for hearing aid consult.     Dizziness: positive right sided emily hallpike ; Epley maneuver performed and pt instructed to stay upright for 24-48 hours. If symptoms recur, will obtain vng.      Nasal congestion/allergic rhinitis: Will start dual nasal spray therapy as combo of steroid  and antihistamine nasal spray has been shown in evidence based studies to be better than either alone. Discussed medication administration technique ( point toward outer corner of eye and not towards nasal septum) and use nasal saline prior to medication sprays.     Otalgia: likely related to known history of TMJ. Do not see evidence of malignancy that would be a source of referred pain. Could be having intermittent ETD as well- nasal sprays will help with this. Advised pt to get a mouth guard at night for tmj. Given her history of diabetes and HTN and as she is on blood thinner, would not recommend NSAID which unfortunately works much better than tylenol for jaw joint pain. Counseled on importance of not clenching jaw, mouth guard usage    hyperthryroidism: I noticed her labs after she had left, tried to call pt to recommend further workup and to double check she is not on exogenous thyroid medication ( I do not see any evidence of this anywhere in her chart). She did not answer her phone , I left her a generic voicemail to please call my office.  no weight loss nor palpitations but she does have a history of CAD. Check for anti-TPO antibodies. If negative would recommend radioactive iodine uptake scan.    F/u in 2-3 months for recheck of symptoms after on nasal sprays

## 2020-07-13 NOTE — PROGRESS NOTES
Click on link to view Audiogram  Document on 7/13/2020 10:34 AM by Elba Wagoner MA CCC-A: Audiogram     Tonya Marquez was seen today for an audiologic evaluation for hearing loss and dizziness.    Tympanometry revealed a Type A tympanogram for both ears.  Audiogram results revealed a moderate sensorineural hearing loss bilaterally.  Speech audiometry revealed a speech reception threshold at 45dBHL with a word recogntion score of 56% for the right ear and a speech reception threshold at 40dBHL with a word recognition score of 68% for the left ear.  The [place]-Hallpike was positive for BPPV to the right side and an Epley maneuver was completed.    Recommendations:  1. Otologic evaluation  2. Repeat Epley in one week if symptoms persist  3. Hearing aid consult

## 2020-07-13 NOTE — LETTER
July 15, 2020      Gretchen Pena MD  [address] [address]  [address]  [place] [address]           [facility]  [address]   [place] [address]  Phone: [phone]          Patient: Tonya Marquez   MR Number: [id number]   YOB: 1929   Date of Visit: 7/13/2020       Dear Dr. Gretchen Pena:    Thank you for referring Tonya Marquez to me for evaluation. Attached you will find relevant portions of my assessment and plan of care.    If you have questions, please do not hesitate to call me. I look forward to following Tonya Marquez along with you.    Sincerely,    Janeth Pop MD    Enclosure  CC:  No Recipients    If you would like to receive this communication electronically, please contact [email or web address] or [phone] to request more information on [facility] [employer] access.    For providers and/or their staff who would like to refer a patient to [facility], please contact us through our one-stop-shop provider referral line, [facility], at [phone].    If you feel you have received this communication in error or would no longer like to receive these types of communications, please e-mail [email or web address]

## 2020-07-20 ENCOUNTER — LAB VISIT (OUTPATIENT)
Dept: LAB | Facility: HOSPITAL | Age: 85
End: 2020-07-20
Attending: OTOLARYNGOLOGY
Payer: MEDICARE

## 2020-07-20 DIAGNOSIS — E05.90 HYPERTHYROIDISM: ICD-10-CM

## 2020-07-20 LAB — THYROPEROXIDASE IGG SERPL-ACNC: <6 IU/ML

## 2020-07-20 PROCEDURE — 36415 COLL VENOUS BLD VENIPUNCTURE: CPT | Mod: HCNC,PO

## 2020-07-20 PROCEDURE — 86376 MICROSOMAL ANTIBODY EACH: CPT | Mod: HCNC

## 2020-07-30 ENCOUNTER — TELEPHONE (OUTPATIENT)
Dept: OTOLARYNGOLOGY | Facility: CLINIC | Age: 85
End: 2020-07-30

## 2020-07-30 DIAGNOSIS — E05.90 HYPERTHYROIDISM: Primary | ICD-10-CM

## 2020-07-30 NOTE — TELEPHONE ENCOUNTER
----- Message from Janeth Pop MD sent at 7/30/2020  2:21 PM CDT -----  Please call the patient back with normal results . I am going to have her get one additional lab/blood work and I would like for her to see an endocrinologist . I will put the order in for both. Thanks

## 2020-07-30 NOTE — TELEPHONE ENCOUNTER
Spoke to Mrs. Shafferson. Made an appointment for Endocrinologist. And told her Dr. Pop wants her to get more blood work.

## 2020-07-31 ENCOUNTER — TELEPHONE (OUTPATIENT)
Dept: OTOLARYNGOLOGY | Facility: CLINIC | Age: 85
End: 2020-07-31

## 2020-07-31 DIAGNOSIS — R42 DIZZINESS: Primary | ICD-10-CM

## 2020-07-31 DIAGNOSIS — H81.11 BENIGN PAROXYSMAL VERTIGO, RIGHT EAR: ICD-10-CM

## 2020-07-31 NOTE — TELEPHONE ENCOUNTER
Patient called in today for advice. She is experiencing more pain in both ears (mostly left ear when she swallows) and a sore throat. No fever and still a lot of dizziness.

## 2020-07-31 NOTE — TELEPHONE ENCOUNTER
she is still dizzy I would like for her to get a vng with audiology if you could set that up please. In my last note her throat exam with scope looked normal. Is this a new symptom? We don't have ability to do rapid strep etc at this office so she may want to check with pcp if this is acute.

## 2020-07-31 NOTE — TELEPHONE ENCOUNTER
Called Patient and informed her that Dr. Pop put a referral in for a VNG.  I gave Patient the phone number to the [facility] to set up the appointment.  Patient has full understanding.

## 2020-08-03 ENCOUNTER — TELEPHONE (OUTPATIENT)
Dept: FAMILY MEDICINE | Facility: CLINIC | Age: 85
End: 2020-08-03

## 2020-08-03 ENCOUNTER — TELEPHONE (OUTPATIENT)
Dept: OTOLARYNGOLOGY | Facility: CLINIC | Age: 85
End: 2020-08-03

## 2020-08-03 NOTE — TELEPHONE ENCOUNTER
----- Message from Jeannine Ruelas sent at 8/3/2020  3:56 PM CDT -----  Type: Patient Call Back    Who called: pt     What is the request in detail: pt states she is still dizzy from previous testing. She is asking for a call back for a f/u and discuss cancelling additional testing.      Can the clinic reply by [facility]? No     Would the patient rather a call back or a response via [facility]? Call back     Best call back number: [phone]    Additional Information:

## 2020-08-03 NOTE — TELEPHONE ENCOUNTER
----- Message from Molly Ace sent at 8/3/2020 11:29 AM CDT -----  Name of Who is Calling: MAURA STODDARD [[id number]]    What is the request in detail: Patient is requesting audio visit and declined virtual visit and in-person visit. Please contact to further discuss and advise      Can the clinic reply by [facility]: no    What [facility]: [phone]

## 2020-08-04 ENCOUNTER — OFFICE VISIT (OUTPATIENT)
Dept: FAMILY MEDICINE | Facility: CLINIC | Age: 85
End: 2020-08-04
Payer: MEDICARE

## 2020-08-04 DIAGNOSIS — J02.9 SORE THROAT: ICD-10-CM

## 2020-08-04 DIAGNOSIS — I25.118 CORONARY ARTERY DISEASE OF NATIVE ARTERY OF NATIVE HEART WITH STABLE ANGINA PECTORIS: ICD-10-CM

## 2020-08-04 DIAGNOSIS — H81.11 BENIGN PAROXYSMAL POSITIONAL VERTIGO OF RIGHT EAR: Primary | ICD-10-CM

## 2020-08-04 DIAGNOSIS — H92.02 ACUTE OTALGIA, LEFT: ICD-10-CM

## 2020-08-04 PROBLEM — I20.89 STABLE ANGINA: Status: RESOLVED | Noted: 2017-06-02 | Resolved: 2020-08-04

## 2020-08-04 PROCEDURE — 1101F PR PT FALLS ASSESS DOC 0-1 FALLS W/OUT INJ PAST YR: ICD-10-PCS | Mod: HCNC,CPTII,95, | Performed by: INTERNAL MEDICINE

## 2020-08-04 PROCEDURE — 99441 PR PHYSICIAN TELEPHONE EVALUATION 5-10 MIN: CPT | Mod: HCNC,95,, | Performed by: INTERNAL MEDICINE

## 2020-08-04 PROCEDURE — 1159F PR MEDICATION LIST DOCUMENTED IN MEDICAL RECORD: ICD-10-PCS | Mod: HCNC,95,, | Performed by: INTERNAL MEDICINE

## 2020-08-04 PROCEDURE — 1159F MED LIST DOCD IN RCRD: CPT | Mod: HCNC,95,, | Performed by: INTERNAL MEDICINE

## 2020-08-04 PROCEDURE — 99441 PR PHYSICIAN TELEPHONE EVALUATION 5-10 MIN: ICD-10-PCS | Mod: HCNC,95,, | Performed by: INTERNAL MEDICINE

## 2020-08-04 PROCEDURE — 1101F PT FALLS ASSESS-DOCD LE1/YR: CPT | Mod: HCNC,CPTII,95, | Performed by: INTERNAL MEDICINE

## 2020-08-04 RX ORDER — MECLIZINE HYDROCHLORIDE 25 MG/1
25 TABLET ORAL 3 TIMES DAILY PRN
Qty: 30 TABLET | Refills: 2 | Status: SHIPPED | OUTPATIENT
Start: 2020-08-04 | End: 2021-04-07

## 2020-08-04 RX ORDER — FLUTICASONE PROPIONATE 50 MCG
1 SPRAY, SUSPENSION (ML) NASAL 2 TIMES DAILY
Qty: 18.2 ML | Refills: 3 | Status: SHIPPED | OUTPATIENT
Start: 2020-08-04

## 2020-08-04 RX ORDER — AZELASTINE 1 MG/ML
1 SPRAY, METERED NASAL 2 TIMES DAILY
Qty: 30 ML | Refills: 3 | Status: SHIPPED | OUTPATIENT
Start: 2020-08-04

## 2020-08-04 NOTE — PROGRESS NOTES
Established Patient - Audio Only Telehealth Visit     The patient location is: home  The chief complaint leading to consultation is: dizziness, L otalgia  Visit type: Virtual visit with audio only (telephone)  Total time spent with patient: 9 min       The reason for the audio only service rather than synchronous audio and video virtual visit was related to technical difficulties or patient preference/necessity.     Each patient to whom I provide medical services by telemedicine is:  (1) informed of the relationship between the physician and patient and the respective role of any other health care provider with respect to management of the patient; and (2) notified that they may decline to receive medical services by telemedicine and may withdraw from such care at any time. Patient verbally consented to receive this service via voice-only telephone call.       HPI: Pt has reportedly had problems since her last visit with me. She had testing done per ENT and Audiology which included a [place] Hallpike. Pt reports she has been dizzy(described as feeling unsteady on her feet) ever since. She also has L ear pain and sore throat that has not been treated. She has been applying Benzocaine OTC with some improvement of pain. Otherwise, she has also been under lots of stress and is now experiencing chest pain for which she is taking Nitro. Pt has not yet followed up with Cardiology because she does not want to go to the ED.     Assessment and plan:  89 yo F with     1. Benign paroxysmal positional vertigo of right ear  - Pt to start Epley maneuver as previously instructed  - meclizine (ANTIVERT) 25 mg tablet; Take 1 tablet (25 mg total) by mouth 3 (three) times daily as needed for Dizziness.  Dispense: 30 tablet; Refill: 2    2. Acute otalgia, left  - Pharmacy reportedly never received meds from ENT, so will resend  - azelastine (ASTELIN) 137 mcg (0.1 %) nasal spray; 1 spray (137 mcg total) by Nasal route 2 (two) times daily.   Dispense: 30 mL; Refill: 3  - fluticasone propionate (FLONASE) 50 mcg/actuation nasal spray; 1 spray (50 mcg total) by Each Nostril route 2 (two) times daily.  Dispense: 18.2 mL; Refill: 3    3. Sore throat  - As above  - azelastine (ASTELIN) 137 mcg (0.1 %) nasal spray; 1 spray (137 mcg total) by Nasal route 2 (two) times daily.  Dispense: 30 mL; Refill: 3  - fluticasone propionate (FLONASE) 50 mcg/actuation nasal spray; 1 spray (50 mcg total) by Each Nostril route 2 (two) times daily.  Dispense: 18.2 mL; Refill: 3    4. Coronary artery disease of native artery of native heart with stable angina pectoris  - Pt advised to f/u with Cardiology and go straight to ED for any acute worsening of current condition failure to improve           RTC in 1-2 weeks as needed for any acute worsening of current condition or failure to improve            This service was not originating from a related E/M service provided within the previous 7 days nor will  to an E/M service or procedure within the next 24 hours or my soonest available appointment.  Prevailing standard of care was able to be met in this audio-only visit.

## 2020-08-05 ENCOUNTER — TELEPHONE (OUTPATIENT)
Dept: AUDIOLOGY | Facility: CLINIC | Age: 85
End: 2020-08-05

## 2020-08-05 NOTE — TELEPHONE ENCOUNTER
Patient had called yesterday and asked to cancel her upcoming appointment for a hearing aid consult and appointment with ENT.  I called to check on her dizziness and to confirm that she wants to cancel both appointments.  She said she wants to get her blood sugar levels under control before coming back to ENT and Audiology.  I canceled both appointments for her and she will call when she is ready to reschedule.

## 2020-08-07 ENCOUNTER — TELEPHONE (OUTPATIENT)
Dept: FAMILY MEDICINE | Facility: CLINIC | Age: 85
End: 2020-08-07

## 2020-08-07 DIAGNOSIS — J06.9 UPPER RESPIRATORY TRACT INFECTION, UNSPECIFIED TYPE: ICD-10-CM

## 2020-08-07 RX ORDER — LEVOCETIRIZINE DIHYDROCHLORIDE 5 MG/1
5 TABLET, FILM COATED ORAL EVERY OTHER DAY
Qty: 30 TABLET | Refills: 1 | Status: SHIPPED | OUTPATIENT
Start: 2020-08-07 | End: 2021-04-07

## 2020-08-07 NOTE — TELEPHONE ENCOUNTER
Please inform pt that a Rx for Xyzal has been sent to her pharmacy. If no improvement, she'll have to call ENT for another appointment.

## 2020-08-07 NOTE — TELEPHONE ENCOUNTER
----- Message from Otto Mora sent at 8/7/2020 11:27 AM CDT -----  Regarding: Pt Advice  Contact: MAURA STODDARD [[id number]]  Name of Who is Calling: MAURA STODDARD [[id number]]      What is the request in detail: Would like to speak with staff in regards to still having a sore throat, ear ache, and no fever. States non of the medication has improved the condition. Please advise      Can the clinic reply by [facility]: no      What [facility] Back [facility]: [phone]

## 2020-09-03 DIAGNOSIS — N18.30 TYPE 2 DIABETES MELLITUS WITH STAGE 3 CHRONIC KIDNEY DISEASE, WITHOUT LONG-TERM CURRENT USE OF INSULIN: ICD-10-CM

## 2020-09-03 DIAGNOSIS — E11.22 TYPE 2 DIABETES MELLITUS WITH STAGE 3 CHRONIC KIDNEY DISEASE, WITHOUT LONG-TERM CURRENT USE OF INSULIN: ICD-10-CM

## 2020-09-03 RX ORDER — METFORMIN HYDROCHLORIDE 750 MG/1
750 TABLET, EXTENDED RELEASE ORAL
Qty: 90 TABLET | Refills: 1 | Status: SHIPPED | OUTPATIENT
Start: 2020-09-03 | End: 2021-02-03

## 2020-09-03 NOTE — TELEPHONE ENCOUNTER
LOV 8/4/2020    Patient requesting to have [facility] listed as her preferred pharmacy.  Stated her most recent refills were sent to [facility], which posed a bit of a problem.     Patient requesting a refill for Metformin.  Stated she was advised by Dr. Pena to have labs done at the end of September, but will not have enough medication to last until then.  Please advise.

## 2020-09-03 NOTE — TELEPHONE ENCOUNTER
----- Message from Yaima Henson sent at 9/3/2020 11:51 AM CDT -----  Regarding: medication questions  Type: Patient Call Back    Who called:Tonya     What is the request in detail: The patient is requesting a call back from the staff. She has some questions to ask in regards to her medication.    Can the clinic reply by [facility]?no    Would the patient rather a call back or a response via [facility]? Call back     Best call back number:[phone]

## 2020-09-09 DIAGNOSIS — I20.89 STABLE ANGINA: ICD-10-CM

## 2020-09-09 RX ORDER — ISOSORBIDE MONONITRATE 60 MG/1
60 TABLET, EXTENDED RELEASE ORAL DAILY
Qty: 90 TABLET | Refills: 3 | Status: SHIPPED | OUTPATIENT
Start: 2020-09-09 | End: 2021-04-12 | Stop reason: SDUPTHER

## 2020-09-09 NOTE — TELEPHONE ENCOUNTER
----- Message from Eli Craig sent at 9/9/2020  3:30 PM CDT -----  Contact: Self [phone]  Type: RX Refill Request    Who Called: Self     Have you contacted your pharmacy: yes    Refill or New Rx: refill    RX Name and Strength: atorvastatin (LIPITOR) 40 MG tablet, isosorbide mononitrate (IMDUR) 60 MG 24 hr tablet, enalapril (VASOTEC) 20 MG tablet & glipiZIDE (GLUCOTROL) 2.5 MG TR24    Preferred Pharmacy with phone number:   [facility] - [place], [place] - [address]  [address] [address]  Phone: [phone] Fax: [phone]    Local or Mail Order: mail order    Would the patient rather a call back or a response via [facility]? Call back    Best Call Back Number: [phone]

## 2020-09-09 NOTE — TELEPHONE ENCOUNTER
Refill request sent to [facility].  Other medication were recently filled and have refills at the pharmacy.       Last Office Visit Info:   The patient's last visit with Gretchen Pena MD was on 8/4/2020.    The patient's last visit in current department was on 8/4/2020.    Last refill [facility]  11/20/2019                  lipitor 8/28/2020                  vasotec 8/28/2020                  Glucotrol 8/25/2020    Last CBC Results:   Lab Results   Component Value Date    WBC 6.73 06/05/2020    HGB 12.5 06/05/2020    HCT 38.4 06/05/2020     06/05/2020       Last CMP Results  Lab Results   Component Value Date     06/05/2020    K 4.8 06/05/2020     06/05/2020    CO2 23 06/05/2020    BUN 25 06/05/2020    CREATININE 1.3 06/05/2020    CALCIUM 10.5 06/05/2020    ALBUMIN 4.2 06/05/2020    AST 18 06/05/2020    ALT 17 06/05/2020       Last Lipids  Lab Results   Component Value Date    CHOL 125 06/05/2020    TRIG 158 (H) 06/05/2020    HDL 46 06/05/2020    LDLCALC 47.4 (L) 06/05/2020       Last A1C  Lab Results   Component Value Date    HGBA1C 8.0 (H) 06/05/2020       Last TSH  Lab Results   Component Value Date    TSH 0.262 (L) 07/09/2020         Current Med Refills  Medication List with Changes/Refills   Current Medications    ASCORBIC ACID, VITAMIN C, (VITAMIN C) 100 MG TABLET    Take 500 mg by mouth once daily.       Start Date: --        End Date: --    ASPIRIN (ECOTRIN) 81 MG EC TABLET    Take 81 mg by mouth once daily.       Start Date: --        End Date: --    ATORVASTATIN (LIPITOR) 40 MG TABLET    TAKE 1 TABLET (40 MG TOTAL) BY MOUTH ONCE DAILY.       Start Date: 8/28/2020 End Date: --    AZELASTINE (ASTELIN) 137 MCG (0.1 %) NASAL SPRAY    1 spray (137 mcg total) by Nasal route 2 (two) times daily.       Start Date: 8/4/2020  End Date: --    BLOOD SUGAR DIAGNOSTIC STRP    Check BID       Start Date: 6/12/2017 End Date: --    CLOPIDOGREL (PLAVIX) 75 MG TABLET    Take 1 tablet (75 mg total) by mouth once  daily.       Start Date: 11/20/2019End Date: --    COLCHICINE 0.6 MG TABLET    Take 2 tablets by mouth, then one hour later take 1 additional tablet       Start Date: 8/14/2018 End Date: --    ENALAPRIL (VASOTEC) 20 MG TABLET    TAKE 1 TABLET EVERY DAY       Start Date: 8/28/2020 End Date: --    FLUTICASONE PROPIONATE (FLONASE) 50 MCG/ACTUATION NASAL SPRAY    1 spray (50 mcg total) by Each Nostril route 2 (two) times daily.       Start Date: 8/4/2020  End Date: --    GLIPIZIDE (GLUCOTROL) 2.5 MG TR24    TAKE 1 TABLET (2.5 MG TOTAL) BY MOUTH DAILY WITH BREAKFAST.       Start Date: 8/28/2020 End Date: --    IBUPROFEN (ADVIL,MOTRIN) 400 MG TABLET           Start Date: 11/7/2017 End Date: --    ISOSORBIDE MONONITRATE (IMDUR) 60 MG 24 HR TABLET    Take 1 tablet (60 mg total) by mouth once daily.       Start Date: 11/20/2019End Date: --    LEVOCETIRIZINE (XYZAL) 5 MG TABLET    Take 1 tablet (5 mg total) by mouth every other day.       Start Date: 8/7/2020  End Date: --    MECLIZINE (ANTIVERT) 25 MG TABLET    Take 1 tablet (25 mg total) by mouth 3 (three) times daily as needed for Dizziness.       Start Date: 8/4/2020  End Date: --    MELATONIN-PYRIDOXAL PHOS, B6, (MELATONIN-B6, PYRIDOXAL PHOS,) 2.5 MG- 338 MCG SUBL    Place under the tongue.       Start Date: --        End Date: --    METFORMIN (GLUCOPHAGE-XR) 750 MG ER 24HR TABLET    Take 1 tablet (750 mg total) by mouth daily with breakfast.       Start Date: 9/3/2020  End Date: 9/3/2021    METOPROLOL SUCCINATE (TOPROL-XL) 25 MG 24 HR TABLET    TAKE 1 TABLET EVERY DAY       Start Date: 5/28/2020 End Date: --    NITROGLYCERIN (NITROSTAT) 0.3 MG SL TABLET    Place 1 tablet (0.3 mg total) under the tongue every 5 (five) minutes as needed for Chest pain.       Start Date: 6/12/2017 End Date: --    SENNA-DOCUSATE 8.6-50 MG (PERICOLACE) 8.6-50 MG PER TABLET    Take 1 tablet by mouth once daily.       Start Date: --        End Date: --       Order(s) placed per written order  guidelines: none    Please advise.

## 2020-09-29 ENCOUNTER — TELEPHONE (OUTPATIENT)
Dept: FAMILY MEDICINE | Facility: CLINIC | Age: 85
End: 2020-09-29

## 2020-09-29 DIAGNOSIS — E11.319 TYPE 2 DIABETES MELLITUS WITH RETINOPATHY, WITHOUT LONG-TERM CURRENT USE OF INSULIN, MACULAR EDEMA PRESENCE UNSPECIFIED, UNSPECIFIED LATERALITY, UNSPECIFIED RETINOPATHY SEVERITY: Primary | ICD-10-CM

## 2020-09-29 NOTE — TELEPHONE ENCOUNTER
Pt calling for lab orders. She states that her blood sugar was elevated, and the Provider wanted her to return in September to have them redone. Pt request that any additional labs be ordered at the same time because she doesn't like to get stuck repeatedly with needles.

## 2020-10-01 ENCOUNTER — TELEPHONE (OUTPATIENT)
Dept: FAMILY MEDICINE | Facility: CLINIC | Age: 85
End: 2020-10-01

## 2020-10-01 ENCOUNTER — LAB VISIT (OUTPATIENT)
Dept: LAB | Facility: HOSPITAL | Age: 85
End: 2020-10-01
Attending: INTERNAL MEDICINE
Payer: MEDICARE

## 2020-10-01 DIAGNOSIS — E11.319 TYPE 2 DIABETES MELLITUS WITH RETINOPATHY, WITHOUT LONG-TERM CURRENT USE OF INSULIN, MACULAR EDEMA PRESENCE UNSPECIFIED, UNSPECIFIED LATERALITY, UNSPECIFIED RETINOPATHY SEVERITY: ICD-10-CM

## 2020-10-01 LAB
ALBUMIN SERPL BCP-MCNC: 4.1 G/DL (ref 3.5–5.2)
ALP SERPL-CCNC: 86 U/L (ref 55–135)
ALT SERPL W/O P-5'-P-CCNC: 15 U/L (ref 10–44)
ANION GAP SERPL CALC-SCNC: 11 MMOL/L (ref 8–16)
AST SERPL-CCNC: 16 U/L (ref 10–40)
BASOPHILS # BLD AUTO: 0.02 K/UL (ref 0–0.2)
BASOPHILS NFR BLD: 0.3 % (ref 0–1.9)
BILIRUB SERPL-MCNC: 0.5 MG/DL (ref 0.1–1)
BUN SERPL-MCNC: 24 MG/DL (ref 10–30)
CALCIUM SERPL-MCNC: 10 MG/DL (ref 8.7–10.5)
CHLORIDE SERPL-SCNC: 108 MMOL/L (ref 95–110)
CO2 SERPL-SCNC: 22 MMOL/L (ref 23–29)
CREAT SERPL-MCNC: 1.2 MG/DL (ref 0.5–1.4)
DIFFERENTIAL METHOD: ABNORMAL
EOSINOPHIL # BLD AUTO: 0.1 K/UL (ref 0–0.5)
EOSINOPHIL NFR BLD: 2 % (ref 0–8)
ERYTHROCYTE [DISTWIDTH] IN BLOOD BY AUTOMATED COUNT: 13.3 % (ref 11.5–14.5)
EST. GFR  (AFRICAN AMERICAN): 45.7 ML/MIN/1.73 M^2
EST. GFR  (NON AFRICAN AMERICAN): 39.6 ML/MIN/1.73 M^2
ESTIMATED AVG GLUCOSE: 148 MG/DL (ref 68–131)
GLUCOSE SERPL-MCNC: 148 MG/DL (ref 70–110)
HBA1C MFR BLD HPLC: 6.8 % (ref 4–5.6)
HCT VFR BLD AUTO: 35.3 % (ref 37–48.5)
HGB BLD-MCNC: 11.5 G/DL (ref 12–16)
IMM GRANULOCYTES # BLD AUTO: 0.03 K/UL (ref 0–0.04)
IMM GRANULOCYTES NFR BLD AUTO: 0.4 % (ref 0–0.5)
LYMPHOCYTES # BLD AUTO: 1.4 K/UL (ref 1–4.8)
LYMPHOCYTES NFR BLD: 19.1 % (ref 18–48)
MCH RBC QN AUTO: 29.9 PG (ref 27–31)
MCHC RBC AUTO-ENTMCNC: 32.6 G/DL (ref 32–36)
MCV RBC AUTO: 92 FL (ref 82–98)
MONOCYTES # BLD AUTO: 0.5 K/UL (ref 0.3–1)
MONOCYTES NFR BLD: 6.6 % (ref 4–15)
NEUTROPHILS # BLD AUTO: 5.1 K/UL (ref 1.8–7.7)
NEUTROPHILS NFR BLD: 71.6 % (ref 38–73)
NRBC BLD-RTO: 0 /100 WBC
PLATELET # BLD AUTO: 183 K/UL (ref 150–350)
PMV BLD AUTO: 12.1 FL (ref 9.2–12.9)
POTASSIUM SERPL-SCNC: 4.9 MMOL/L (ref 3.5–5.1)
PROT SERPL-MCNC: 6.9 G/DL (ref 6–8.4)
RBC # BLD AUTO: 3.84 M/UL (ref 4–5.4)
SODIUM SERPL-SCNC: 141 MMOL/L (ref 136–145)
WBC # BLD AUTO: 7.08 K/UL (ref 3.9–12.7)

## 2020-10-01 PROCEDURE — 36415 COLL VENOUS BLD VENIPUNCTURE: CPT | Mod: HCNC,PO

## 2020-10-01 PROCEDURE — 83036 HEMOGLOBIN GLYCOSYLATED A1C: CPT | Mod: HCNC

## 2020-10-01 PROCEDURE — 80053 COMPREHEN METABOLIC PANEL: CPT | Mod: HCNC

## 2020-10-01 PROCEDURE — 85025 COMPLETE CBC W/AUTO DIFF WBC: CPT | Mod: HCNC

## 2020-10-01 NOTE — TELEPHONE ENCOUNTER
Patient advised that CBC, CMP HgA1c and microalbumin/creatinine urine ratio labs were ordered and drawn this morning.  Patient verbalized understanding.

## 2020-10-23 DIAGNOSIS — E11.22 TYPE 2 DIABETES MELLITUS WITH STAGE 3 CHRONIC KIDNEY DISEASE, WITHOUT LONG-TERM CURRENT USE OF INSULIN: ICD-10-CM

## 2020-10-23 DIAGNOSIS — N18.30 TYPE 2 DIABETES MELLITUS WITH STAGE 3 CHRONIC KIDNEY DISEASE, WITHOUT LONG-TERM CURRENT USE OF INSULIN: ICD-10-CM

## 2020-10-23 RX ORDER — GLIPIZIDE 2.5 MG/1
2.5 TABLET, EXTENDED RELEASE ORAL
Qty: 90 TABLET | Refills: 1 | Status: SHIPPED | OUTPATIENT
Start: 2020-10-23 | End: 2020-10-26 | Stop reason: SDUPTHER

## 2020-10-23 RX ORDER — METOPROLOL SUCCINATE 25 MG/1
25 TABLET, EXTENDED RELEASE ORAL DAILY
Qty: 90 TABLET | Refills: 1 | Status: SHIPPED | OUTPATIENT
Start: 2020-10-23 | End: 2020-10-26 | Stop reason: SDUPTHER

## 2020-10-23 NOTE — TELEPHONE ENCOUNTER
----- Message from Yulisa Morrell sent at 10/23/2020 10:38 AM CDT -----  Regarding: [facility] pharmacy [phone]  Type: RX Refill Request    Who Called:[facility] pharmacy [phone]    Have you contacted your pharmacy:    Refill or New Rxmetoprolol succinate (TOPROL-XL) 25 MG 24 hr tablet    glipiZIDE (GLUCOTROL) 2.5 MG TR24    RX Name and Strength:    How is the patient currently taking it? (ex. 1XDay):    Is this a 30 day or 90 day RX:    Preferred Pharmacy with phone number:    Local or Mail Order:    Ordering Provider:    Would the patient rather a call back or a response via [email or web address]?     Best Call Back Number:    Additional Information:

## 2020-10-26 DIAGNOSIS — E11.22 TYPE 2 DIABETES MELLITUS WITH STAGE 3 CHRONIC KIDNEY DISEASE, WITHOUT LONG-TERM CURRENT USE OF INSULIN: ICD-10-CM

## 2020-10-26 DIAGNOSIS — N18.30 TYPE 2 DIABETES MELLITUS WITH STAGE 3 CHRONIC KIDNEY DISEASE, WITHOUT LONG-TERM CURRENT USE OF INSULIN: ICD-10-CM

## 2020-10-26 RX ORDER — GLIPIZIDE 2.5 MG/1
2.5 TABLET, EXTENDED RELEASE ORAL
Qty: 90 TABLET | Refills: 1 | Status: SHIPPED | OUTPATIENT
Start: 2020-10-26 | End: 2021-03-26 | Stop reason: SDUPTHER

## 2020-10-26 RX ORDER — METOPROLOL SUCCINATE 25 MG/1
25 TABLET, EXTENDED RELEASE ORAL DAILY
Qty: 90 TABLET | Refills: 1 | Status: SHIPPED | OUTPATIENT
Start: 2020-10-26 | End: 2023-02-10

## 2020-11-17 ENCOUNTER — TELEPHONE (OUTPATIENT)
Dept: FAMILY MEDICINE | Facility: CLINIC | Age: 85
End: 2020-11-17

## 2020-11-17 NOTE — TELEPHONE ENCOUNTER
Return call to Pt, and she states that she handled it already it was a mix up. But, that someone called her this morning already.

## 2020-11-17 NOTE — PROGRESS NOTES
Subjective:      Patient ID: Tonya Marquez is a 89 y.o. female presented to Endocrinology clinic on 11/18/2020.    Chief Complaint:  Thyroid Problem      History of Present Illness: Tonya Marquez is a 89 y.o. female with Controlled type 2 diabetes on oral medications, CKD stage 3, HDL, CAD/MI.  Patient is here for initial evaluation of abnormal thyroid lab work, low TSH    With regards to hyperthyroidism:  First noted on lab work in June 2020, no prior he reported history of hyperthyroidism or thyroid abnormality.  No family history of hyperthyroidism, thyroid abnormality, thyroid cancer    Patient reports over the last year she has been extremely stressed due to her bank and money concern    Patient denies any symptoms of hyperthyroidism  No tremors  No palpitation  Denies any visual issues, pain with ocular movements, proptosis     Medication reviewed  Not taking Lithium, Amiodarone  Not on NSAIDs    Denies any Herbal/Vitamin/Supplements    Patient has chronic pain in the right hip, seen orthopedic      Lab Results   Component Value Date    TSH 0.262 (L) 07/09/2020    TSH 0.348 (L) 06/05/2020    TSH 0.550 10/03/2019    FREET4 0.87 07/09/2020    FREET4 0.91 06/05/2020    THYROPEROXID <6.0 07/20/2020       With regard to type 2 diabetes  Well control, on oral medications    Lab Results   Component Value Date    HGBA1C 6.8 (H) 10/01/2020       Reviewed past surgical, medical, family, social history and updated as appropriate.    Review of Systems   Constitutional: Negative for activity change and unexpected weight change.   HENT: Negative for sore throat and voice change.    Eyes: Negative for visual disturbance.   Respiratory: Negative for shortness of breath.    Cardiovascular: Negative for chest pain and palpitations.   Gastrointestinal: Negative for abdominal pain, constipation, diarrhea, nausea and vomiting.   Genitourinary: Negative for urgency.   Musculoskeletal: Negative for arthralgias.   Skin:  Negative for wound.   Neurological: Negative for headaches.   Psychiatric/Behavioral: Negative for confusion and sleep disturbance.       Objective:   BP (!) 116/53 (BP Location: Left arm, Patient Position: Sitting, BP Method: Large (Automatic))   Pulse 64   Temp 97.9 °F (36.6 °C) (Temporal)   Wt 68.1 kg (150 lb 3.2 oz)   BMI 26.61 kg/m²     Body mass index is 26.61 kg/m².    Physical Exam  Vitals signs and nursing note reviewed.   Constitutional:       General: She is not in acute distress.     Appearance: She is well-developed.   HENT:      Head: Normocephalic and atraumatic.      Right Ear: External ear normal.      Left Ear: External ear normal.      Nose: Nose normal.   Eyes:      General: No scleral icterus.        Right eye: No discharge.         Left eye: No discharge.      Conjunctiva/sclera: Conjunctivae normal.      Comments: No issues with ocular movements, no proptosis, no edema   Neck:      Musculoskeletal: Normal range of motion and neck supple. No neck rigidity.      Thyroid: No thyromegaly.      Trachea: No tracheal deviation.      Comments: No thyroid enlargement noted on exam  Cardiovascular:      Rate and Rhythm: Normal rate.      Heart sounds: Normal heart sounds. No murmur.   Pulmonary:      Effort: Pulmonary effort is normal.      Breath sounds: Normal breath sounds.   Abdominal:      Palpations: Abdomen is soft. There is no mass.      Tenderness: There is no abdominal tenderness.   Musculoskeletal: Normal range of motion.   Lymphadenopathy:      Cervical: No cervical adenopathy.   Skin:     General: Skin is warm.      Findings: No rash.   Neurological:      Mental Status: She is alert and oriented to person, place, and time.      Sensory: No sensory deficit.      Coordination: Coordination normal.   Psychiatric:         Judgment: Judgment normal.         Lab Review:   Lab Results   Component Value Date    HGBA1C 6.8 (H) 10/01/2020       Lab Results   Component Value Date    CHOL 125  06/05/2020    HDL 46 06/05/2020    LDLCALC 47.4 (L) 06/05/2020    TRIG 158 (H) 06/05/2020    CHOLHDL 36.8 06/05/2020       Lab Results   Component Value Date     10/01/2020    K 4.9 10/01/2020     10/01/2020    CO2 22 (L) 10/01/2020     (H) 10/01/2020    BUN 24 10/01/2020    CREATININE 1.2 10/01/2020    CALCIUM 10.0 10/01/2020    PROT 6.9 10/01/2020    ALBUMIN 4.1 10/01/2020    BILITOT 0.5 10/01/2020    ALKPHOS 86 10/01/2020    AST 16 10/01/2020    ALT 15 10/01/2020    ANIONGAP 11 10/01/2020    ESTGFRAFRICA 45.7 (A) 10/01/2020    EGFRNONAA 39.6 (A) 10/01/2020    TSH 0.262 (L) 07/09/2020        Lab Results   Component Value Date    CALCIUM 10.0 10/01/2020    CALCIUM 10.5 06/05/2020    CALCIUM 10.4 10/03/2019    ALKPHOS 86 10/01/2020    ALKPHOS 80 06/05/2020    ALKPHOS 100 10/03/2019    TSH 0.262 (L) 07/09/2020       Assessment and Plan     Type 2 diabetes mellitus with diabetic polyneuropathy, without long-term current use of insulin  Last A1c 6.8, appropriate given age  A1c goal for her age 7-7.5  Continue oral medication metformin and glipizide, consider stopping glipizide if patient would experience hypoglycemia  Continue PCP management, endocrine will be available if needed    Hyperthyroidism  Patient with mildly suppressed TSH, 0.262, 0.348, with normal free T4, and normal TPO  Differential diagnosis includes most likely nonthyroidal illness/stress vs Graves or MNG/toxic nodule  Cause-specific treatment options and associated risks discussed with patient.   Patient without any signs of Graves ophthalmopathy, reassuring  Patient without any cardiac symptoms, no palpitation, AFib reported    Plan:  - Repeat TSH, FT4, T3  - Check TSI Ab  - pending results can consider nuclear medicine uptake scan  - reassurance given    Pure hypercholesterolemia  On statin therapy      We will plan for to follow up as needed, pending workup of abnormal thyroid lab work above          Tom Gomez,  MD  Endocrinology  11/18/2020 4:21 PM    Disclaimer: This note has been generated using voice-recognition software. There may be typographical errors that have been missed during proof-reading.

## 2020-11-17 NOTE — TELEPHONE ENCOUNTER
----- Message from Otto Mora sent at 11/16/2020  4:11 PM CST -----  Regarding: Pt Advice  Contact: MAURA STODDARD [[id number]]  Name of Who is Calling: MAURA STODDARD [[id number]]      What is the request in detail: Would like to speak with staff in regards to reason why she needs to go to the endocrinologist. Please advise, also why she needed all the test that needed to be done.       Can the clinic reply by [facility]: no      What [facility] Back [facility]: [phone]

## 2020-11-18 ENCOUNTER — PATIENT OUTREACH (OUTPATIENT)
Dept: ADMINISTRATIVE | Facility: OTHER | Age: 85
End: 2020-11-18

## 2020-11-18 ENCOUNTER — OFFICE VISIT (OUTPATIENT)
Dept: ENDOCRINOLOGY | Facility: CLINIC | Age: 85
End: 2020-11-18
Payer: MEDICARE

## 2020-11-18 VITALS
HEART RATE: 64 BPM | TEMPERATURE: 98 F | DIASTOLIC BLOOD PRESSURE: 53 MMHG | WEIGHT: 150.19 LBS | BODY MASS INDEX: 26.61 KG/M2 | SYSTOLIC BLOOD PRESSURE: 116 MMHG

## 2020-11-18 DIAGNOSIS — E11.42 TYPE 2 DIABETES MELLITUS WITH DIABETIC POLYNEUROPATHY, WITHOUT LONG-TERM CURRENT USE OF INSULIN: ICD-10-CM

## 2020-11-18 DIAGNOSIS — E78.00 PURE HYPERCHOLESTEROLEMIA: Primary | ICD-10-CM

## 2020-11-18 DIAGNOSIS — N18.31 TYPE 2 DIABETES MELLITUS WITH STAGE 3A CHRONIC KIDNEY DISEASE, WITHOUT LONG-TERM CURRENT USE OF INSULIN: ICD-10-CM

## 2020-11-18 DIAGNOSIS — E05.90 HYPERTHYROIDISM: ICD-10-CM

## 2020-11-18 DIAGNOSIS — E11.22 TYPE 2 DIABETES MELLITUS WITH STAGE 3A CHRONIC KIDNEY DISEASE, WITHOUT LONG-TERM CURRENT USE OF INSULIN: ICD-10-CM

## 2020-11-18 PROCEDURE — 99999 PR PBB SHADOW E&M-EST. PATIENT-LVL V: ICD-10-PCS | Mod: PBBFAC,HCNC,, | Performed by: HOSPITALIST

## 2020-11-18 PROCEDURE — 99999 PR PBB SHADOW E&M-EST. PATIENT-LVL V: CPT | Mod: PBBFAC,HCNC,, | Performed by: HOSPITALIST

## 2020-11-18 PROCEDURE — 99204 PR OFFICE/OUTPT VISIT, NEW, LEVL IV, 45-59 MIN: ICD-10-PCS | Mod: HCNC,S$GLB,, | Performed by: HOSPITALIST

## 2020-11-18 PROCEDURE — 1159F PR MEDICATION LIST DOCUMENTED IN MEDICAL RECORD: ICD-10-PCS | Mod: HCNC,S$GLB,, | Performed by: HOSPITALIST

## 2020-11-18 PROCEDURE — 1126F AMNT PAIN NOTED NONE PRSNT: CPT | Mod: HCNC,S$GLB,, | Performed by: HOSPITALIST

## 2020-11-18 PROCEDURE — 99204 OFFICE O/P NEW MOD 45 MIN: CPT | Mod: HCNC,S$GLB,, | Performed by: HOSPITALIST

## 2020-11-18 PROCEDURE — 1159F MED LIST DOCD IN RCRD: CPT | Mod: HCNC,S$GLB,, | Performed by: HOSPITALIST

## 2020-11-18 PROCEDURE — 1126F PR PAIN SEVERITY QUANTIFIED, NO PAIN PRESENT: ICD-10-PCS | Mod: HCNC,S$GLB,, | Performed by: HOSPITALIST

## 2020-11-18 NOTE — ASSESSMENT & PLAN NOTE
Last A1c 6.8, appropriate given age  A1c goal for her age 7-7.5  Continue oral medication metformin and glipizide, consider stopping glipizide if patient would experience hypoglycemia  Continue PCP management, endocrine will be available if needed

## 2020-11-18 NOTE — ASSESSMENT & PLAN NOTE
Patient with mildly suppressed TSH, 0.262, 0.348, with normal free T4, and normal TPO  Differential diagnosis includes most likely nonthyroidal illness/stress vs Graves or MNG/toxic nodule  Cause-specific treatment options and associated risks discussed with patient.   Patient without any signs of Graves ophthalmopathy, reassuring  Patient without any cardiac symptoms, no palpitation, AFib reported    Plan:  - Repeat TSH, FT4, T3  - Check TSI Ab  - pending results can consider nuclear medicine uptake scan  - reassurance given

## 2020-11-18 NOTE — LETTER
November 18, 2020      Janeth Pop MD  [address] [facility] [address]  [place] [address]           [place]Di[facility]  [address]  [place] [address]  Phone: [phone]  Fax: [phone]          Patient: Tonya Marquez   MR Number: [id number]   YOB: 1929   Date of Visit: 11/18/2020       Dear Dr. Janeth Pop:    Thank you for referring Tonya Marquez to me for evaluation. Attached you will find relevant portions of my assessment and plan of care.    If you have questions, please do not hesitate to call me. I look forward to following Tonya Marquez along with you.    Sincerely,    Tom Gomez MD    Enclosure  CC:  No Recipients    If you would like to receive this communication electronically, please contact [email or web address] or [phone] to request more information on [facility] access.    For providers and/or their staff who would like to refer a patient to [facility], please contact us through our one-stop-shop provider referral line, [facility], at [phone].    If you feel you have received this communication in error or would no longer like to receive these types of communications, please e-mail [email or web address]

## 2020-11-19 ENCOUNTER — LAB VISIT (OUTPATIENT)
Dept: LAB | Facility: HOSPITAL | Age: 85
End: 2020-11-19
Attending: HOSPITALIST
Payer: MEDICARE

## 2020-11-19 DIAGNOSIS — E05.90 HYPERTHYROIDISM: ICD-10-CM

## 2020-11-19 LAB
T3 SERPL-MCNC: 78 NG/DL (ref 60–180)
T4 FREE SERPL-MCNC: 0.93 NG/DL (ref 0.71–1.51)
T4 SERPL-MCNC: 6.8 UG/DL (ref 4.5–11.5)
TSH SERPL DL<=0.005 MIU/L-ACNC: 0.91 UIU/ML (ref 0.4–4)

## 2020-11-19 PROCEDURE — 84436 ASSAY OF TOTAL THYROXINE: CPT | Mod: HCNC

## 2020-11-19 PROCEDURE — 84439 ASSAY OF FREE THYROXINE: CPT | Mod: HCNC

## 2020-11-19 PROCEDURE — 84480 ASSAY TRIIODOTHYRONINE (T3): CPT | Mod: HCNC

## 2020-11-19 PROCEDURE — 84443 ASSAY THYROID STIM HORMONE: CPT | Mod: HCNC

## 2020-11-19 PROCEDURE — 84445 ASSAY OF TSI GLOBULIN: CPT | Mod: HCNC

## 2020-11-20 ENCOUNTER — TELEPHONE (OUTPATIENT)
Dept: FAMILY MEDICINE | Facility: CLINIC | Age: 85
End: 2020-11-20

## 2020-11-20 NOTE — TELEPHONE ENCOUNTER
Call placed to Pt, and she states that her Insurance company sent her a letter in the mail stating that Dr. Turner Coffey is her new PCP. She wants to know if  dropped her as a Patient. Informed her that she will need to contact her Insurance to let them now that she already has a PCP. That most insurance companies   will assign you a PCP if you don't list one. She acknowledged understanding.

## 2020-11-20 NOTE — TELEPHONE ENCOUNTER
----- Message from Indiana Del Angel sent at 11/20/2020  8:28 AM CST -----  Type: Patient Call Back    Who called:self    What is the request in detail: patient would like to discuss if Dr Pena discontinue her care. Please call     Can the clinic reply by [facility]? no    Would the patient rather a call back or a response via [facility]? call    Best call back number:.[phone] (home)

## 2020-11-23 ENCOUNTER — TELEPHONE (OUTPATIENT)
Dept: ENDOCRINOLOGY | Facility: CLINIC | Age: 85
End: 2020-11-23

## 2020-11-23 LAB — TSI SER-ACNC: <0.1 IU/L

## 2020-11-23 NOTE — TELEPHONE ENCOUNTER
Called patient on on 11/23/2020 2:43 PM to review recent result.   I discussed the recent results of normal thyroid lab work  Normal TSI: r/o Graves Dx      Pt is aware at this time of finding.  All questions were answered to patient satisfaction     No further follow up needed.    Tom Gomez MD  Endocrinology  11/23/2020

## 2020-12-06 ENCOUNTER — PES CALL (OUTPATIENT)
Dept: ADMINISTRATIVE | Facility: CLINIC | Age: 85
End: 2020-12-06

## 2021-03-19 ENCOUNTER — PES CALL (OUTPATIENT)
Dept: ADMINISTRATIVE | Facility: CLINIC | Age: 86
End: 2021-03-19

## 2021-03-23 ENCOUNTER — TELEPHONE (OUTPATIENT)
Dept: FAMILY MEDICINE | Facility: CLINIC | Age: 86
End: 2021-03-23

## 2021-03-23 DIAGNOSIS — N18.32 STAGE 3B CHRONIC KIDNEY DISEASE: ICD-10-CM

## 2021-03-23 DIAGNOSIS — E78.00 PURE HYPERCHOLESTEROLEMIA: ICD-10-CM

## 2021-03-23 DIAGNOSIS — E11.319 TYPE 2 DIABETES MELLITUS WITH RETINOPATHY, WITHOUT LONG-TERM CURRENT USE OF INSULIN, MACULAR EDEMA PRESENCE UNSPECIFIED, UNSPECIFIED LATERALITY, UNSPECIFIED RETINOPATHY SEVERITY: Primary | ICD-10-CM

## 2021-03-26 DIAGNOSIS — E11.22 TYPE 2 DIABETES MELLITUS WITH STAGE 3 CHRONIC KIDNEY DISEASE, WITHOUT LONG-TERM CURRENT USE OF INSULIN: ICD-10-CM

## 2021-03-26 DIAGNOSIS — N18.30 TYPE 2 DIABETES MELLITUS WITH STAGE 3 CHRONIC KIDNEY DISEASE, WITHOUT LONG-TERM CURRENT USE OF INSULIN: ICD-10-CM

## 2021-03-26 RX ORDER — GLIPIZIDE 2.5 MG/1
2.5 TABLET, EXTENDED RELEASE ORAL
Qty: 90 TABLET | Refills: 0 | Status: SHIPPED | OUTPATIENT
Start: 2021-03-26 | End: 2021-04-12 | Stop reason: SDUPTHER

## 2021-04-07 ENCOUNTER — OFFICE VISIT (OUTPATIENT)
Dept: FAMILY MEDICINE | Facility: CLINIC | Age: 86
End: 2021-04-07
Payer: MEDICARE

## 2021-04-07 ENCOUNTER — LAB VISIT (OUTPATIENT)
Dept: LAB | Facility: HOSPITAL | Age: 86
End: 2021-04-07
Attending: INTERNAL MEDICINE
Payer: MEDICARE

## 2021-04-07 VITALS
WEIGHT: 151.88 LBS | TEMPERATURE: 98 F | DIASTOLIC BLOOD PRESSURE: 60 MMHG | OXYGEN SATURATION: 98 % | HEART RATE: 65 BPM | SYSTOLIC BLOOD PRESSURE: 126 MMHG | BODY MASS INDEX: 26.91 KG/M2 | HEIGHT: 63 IN

## 2021-04-07 DIAGNOSIS — G25.2 INTENTION TREMOR: ICD-10-CM

## 2021-04-07 DIAGNOSIS — H61.22 LEFT EAR IMPACTED CERUMEN: ICD-10-CM

## 2021-04-07 DIAGNOSIS — R42 DIZZINESS: ICD-10-CM

## 2021-04-07 DIAGNOSIS — N18.32 STAGE 3B CHRONIC KIDNEY DISEASE: ICD-10-CM

## 2021-04-07 DIAGNOSIS — N18.31 TYPE 2 DIABETES MELLITUS WITH STAGE 3A CHRONIC KIDNEY DISEASE, WITHOUT LONG-TERM CURRENT USE OF INSULIN: ICD-10-CM

## 2021-04-07 DIAGNOSIS — E78.00 PURE HYPERCHOLESTEROLEMIA: ICD-10-CM

## 2021-04-07 DIAGNOSIS — E11.22 TYPE 2 DIABETES MELLITUS WITH STAGE 3A CHRONIC KIDNEY DISEASE, WITHOUT LONG-TERM CURRENT USE OF INSULIN: ICD-10-CM

## 2021-04-07 DIAGNOSIS — Z00.00 ANNUAL PHYSICAL EXAM: Primary | ICD-10-CM

## 2021-04-07 DIAGNOSIS — E11.51 TYPE 2 DIABETES MELLITUS WITH DIABETIC PERIPHERAL ANGIOPATHY WITHOUT GANGRENE, WITHOUT LONG-TERM CURRENT USE OF INSULIN: ICD-10-CM

## 2021-04-07 DIAGNOSIS — E11.319 TYPE 2 DIABETES MELLITUS WITH RETINOPATHY, WITHOUT LONG-TERM CURRENT USE OF INSULIN, MACULAR EDEMA PRESENCE UNSPECIFIED, UNSPECIFIED LATERALITY, UNSPECIFIED RETINOPATHY SEVERITY: ICD-10-CM

## 2021-04-07 LAB
ALBUMIN SERPL BCP-MCNC: 4.2 G/DL (ref 3.5–5.2)
ALP SERPL-CCNC: 78 U/L (ref 55–135)
ALT SERPL W/O P-5'-P-CCNC: 15 U/L (ref 10–44)
ANION GAP SERPL CALC-SCNC: 13 MMOL/L (ref 8–16)
AST SERPL-CCNC: 18 U/L (ref 10–40)
BASOPHILS # BLD AUTO: 0.02 K/UL (ref 0–0.2)
BASOPHILS NFR BLD: 0.3 % (ref 0–1.9)
BILIRUB SERPL-MCNC: 0.5 MG/DL (ref 0.1–1)
BUN SERPL-MCNC: 24 MG/DL (ref 10–30)
CALCIUM SERPL-MCNC: 10.6 MG/DL (ref 8.7–10.5)
CHLORIDE SERPL-SCNC: 107 MMOL/L (ref 95–110)
CHOLEST SERPL-MCNC: 111 MG/DL (ref 120–199)
CHOLEST/HDLC SERPL: 2.3 {RATIO} (ref 2–5)
CO2 SERPL-SCNC: 22 MMOL/L (ref 23–29)
CREAT SERPL-MCNC: 1.2 MG/DL (ref 0.5–1.4)
DIFFERENTIAL METHOD: NORMAL
EOSINOPHIL # BLD AUTO: 0.1 K/UL (ref 0–0.5)
EOSINOPHIL NFR BLD: 1.9 % (ref 0–8)
ERYTHROCYTE [DISTWIDTH] IN BLOOD BY AUTOMATED COUNT: 13.6 % (ref 11.5–14.5)
EST. GFR  (AFRICAN AMERICAN): 45 ML/MIN/1.73 M^2
EST. GFR  (NON AFRICAN AMERICAN): 39 ML/MIN/1.73 M^2
GLUCOSE SERPL-MCNC: 119 MG/DL (ref 70–110)
HCT VFR BLD AUTO: 39 % (ref 37–48.5)
HDLC SERPL-MCNC: 48 MG/DL (ref 40–75)
HDLC SERPL: 43.2 % (ref 20–50)
HGB BLD-MCNC: 12.6 G/DL (ref 12–16)
IMM GRANULOCYTES # BLD AUTO: 0.01 K/UL (ref 0–0.04)
IMM GRANULOCYTES NFR BLD AUTO: 0.1 % (ref 0–0.5)
LDLC SERPL CALC-MCNC: 28 MG/DL (ref 63–159)
LYMPHOCYTES # BLD AUTO: 1.6 K/UL (ref 1–4.8)
LYMPHOCYTES NFR BLD: 22.2 % (ref 18–48)
MCH RBC QN AUTO: 29.6 PG (ref 27–31)
MCHC RBC AUTO-ENTMCNC: 32.3 G/DL (ref 32–36)
MCV RBC AUTO: 92 FL (ref 82–98)
MONOCYTES # BLD AUTO: 0.4 K/UL (ref 0.3–1)
MONOCYTES NFR BLD: 5.9 % (ref 4–15)
NEUTROPHILS # BLD AUTO: 5.1 K/UL (ref 1.8–7.7)
NEUTROPHILS NFR BLD: 69.6 % (ref 38–73)
NONHDLC SERPL-MCNC: 63 MG/DL
NRBC BLD-RTO: 0 /100 WBC
PLATELET # BLD AUTO: 196 K/UL (ref 150–450)
PMV BLD AUTO: 11.9 FL (ref 9.2–12.9)
POTASSIUM SERPL-SCNC: 4.7 MMOL/L (ref 3.5–5.1)
PROT SERPL-MCNC: 7.4 G/DL (ref 6–8.4)
RBC # BLD AUTO: 4.26 M/UL (ref 4–5.4)
SODIUM SERPL-SCNC: 142 MMOL/L (ref 136–145)
TRIGL SERPL-MCNC: 175 MG/DL (ref 30–150)
TSH SERPL DL<=0.005 MIU/L-ACNC: 0.64 UIU/ML (ref 0.4–4)
WBC # BLD AUTO: 7.3 K/UL (ref 3.9–12.7)

## 2021-04-07 PROCEDURE — 99999 PR PBB SHADOW E&M-EST. PATIENT-LVL III: ICD-10-PCS | Mod: PBBFAC,,, | Performed by: INTERNAL MEDICINE

## 2021-04-07 PROCEDURE — 1126F AMNT PAIN NOTED NONE PRSNT: CPT | Mod: S$GLB,,, | Performed by: INTERNAL MEDICINE

## 2021-04-07 PROCEDURE — 99397 PR PREVENTIVE VISIT,EST,65 & OVER: ICD-10-PCS | Mod: 25,S$GLB,, | Performed by: INTERNAL MEDICINE

## 2021-04-07 PROCEDURE — 69210 REMOVE IMPACTED EAR WAX UNI: CPT | Mod: S$GLB,,, | Performed by: INTERNAL MEDICINE

## 2021-04-07 PROCEDURE — 80053 COMPREHEN METABOLIC PANEL: CPT | Performed by: INTERNAL MEDICINE

## 2021-04-07 PROCEDURE — 80061 LIPID PANEL: CPT | Performed by: INTERNAL MEDICINE

## 2021-04-07 PROCEDURE — 84443 ASSAY THYROID STIM HORMONE: CPT | Performed by: INTERNAL MEDICINE

## 2021-04-07 PROCEDURE — 69210 PR REMOVAL IMPACTED CERUMEN REQUIRING INSTRUMENTATION, UNILATERAL: ICD-10-PCS | Mod: S$GLB,,, | Performed by: INTERNAL MEDICINE

## 2021-04-07 PROCEDURE — 83036 HEMOGLOBIN GLYCOSYLATED A1C: CPT | Performed by: INTERNAL MEDICINE

## 2021-04-07 PROCEDURE — 36415 COLL VENOUS BLD VENIPUNCTURE: CPT | Mod: PO | Performed by: INTERNAL MEDICINE

## 2021-04-07 PROCEDURE — 99999 PR PBB SHADOW E&M-EST. PATIENT-LVL III: CPT | Mod: PBBFAC,,, | Performed by: INTERNAL MEDICINE

## 2021-04-07 PROCEDURE — 99499 RISK ADDL DX/OHS AUDIT: ICD-10-PCS | Mod: S$GLB,,, | Performed by: INTERNAL MEDICINE

## 2021-04-07 PROCEDURE — 99499 UNLISTED E&M SERVICE: CPT | Mod: S$GLB,,, | Performed by: INTERNAL MEDICINE

## 2021-04-07 PROCEDURE — 85025 COMPLETE CBC W/AUTO DIFF WBC: CPT | Performed by: INTERNAL MEDICINE

## 2021-04-07 PROCEDURE — 99397 PER PM REEVAL EST PAT 65+ YR: CPT | Mod: 25,S$GLB,, | Performed by: INTERNAL MEDICINE

## 2021-04-07 PROCEDURE — 1126F PR PAIN SEVERITY QUANTIFIED, NO PAIN PRESENT: ICD-10-PCS | Mod: S$GLB,,, | Performed by: INTERNAL MEDICINE

## 2021-04-07 RX ORDER — ENALAPRIL MALEATE 20 MG/1
20 TABLET ORAL DAILY
Qty: 30 TABLET | Refills: 0 | Status: SHIPPED | OUTPATIENT
Start: 2021-04-07 | End: 2021-04-07

## 2021-04-07 RX ORDER — ENALAPRIL MALEATE 20 MG/1
20 TABLET ORAL DAILY
Qty: 90 TABLET | Refills: 3 | Status: SHIPPED | OUTPATIENT
Start: 2021-04-07 | End: 2021-04-12 | Stop reason: SDUPTHER

## 2021-04-07 RX ORDER — MECLIZINE HYDROCHLORIDE 25 MG/1
25 TABLET ORAL 3 TIMES DAILY PRN
Qty: 90 TABLET | Refills: 0 | Status: SHIPPED | OUTPATIENT
Start: 2021-04-07 | End: 2022-03-07

## 2021-04-07 RX ORDER — SODIUM FLUORIDE/POTASSIUM NIT 1.1 %-5 %
PASTE (ML) DENTAL
COMMUNITY
Start: 2021-02-14

## 2021-04-09 LAB
ESTIMATED AVG GLUCOSE: 146 MG/DL (ref 68–131)
HBA1C MFR BLD: 6.7 % (ref 4–5.6)

## 2021-04-12 DIAGNOSIS — E11.22 TYPE 2 DIABETES MELLITUS WITH STAGE 3 CHRONIC KIDNEY DISEASE, WITHOUT LONG-TERM CURRENT USE OF INSULIN: ICD-10-CM

## 2021-04-12 DIAGNOSIS — I20.89 STABLE ANGINA: ICD-10-CM

## 2021-04-12 DIAGNOSIS — E11.51 TYPE 2 DIABETES MELLITUS WITH DIABETIC PERIPHERAL ANGIOPATHY WITHOUT GANGRENE, WITHOUT LONG-TERM CURRENT USE OF INSULIN: ICD-10-CM

## 2021-04-12 DIAGNOSIS — M10.9 ACUTE GOUT OF LEFT HAND, UNSPECIFIED CAUSE: ICD-10-CM

## 2021-04-12 DIAGNOSIS — N18.30 TYPE 2 DIABETES MELLITUS WITH STAGE 3 CHRONIC KIDNEY DISEASE, WITHOUT LONG-TERM CURRENT USE OF INSULIN: ICD-10-CM

## 2021-04-12 RX ORDER — GLIPIZIDE 2.5 MG/1
2.5 TABLET, EXTENDED RELEASE ORAL
Qty: 90 TABLET | Refills: 0 | Status: SHIPPED | OUTPATIENT
Start: 2021-04-12 | End: 2022-02-23

## 2021-04-12 RX ORDER — CLOPIDOGREL BISULFATE 75 MG/1
75 TABLET ORAL DAILY
Qty: 90 TABLET | Refills: 3 | Status: SHIPPED | OUTPATIENT
Start: 2021-04-12 | End: 2022-05-17

## 2021-04-12 RX ORDER — METFORMIN HYDROCHLORIDE 750 MG/1
750 TABLET, EXTENDED RELEASE ORAL
Qty: 90 TABLET | Refills: 1 | Status: SHIPPED | OUTPATIENT
Start: 2021-04-12 | End: 2021-10-05

## 2021-04-12 RX ORDER — ISOSORBIDE MONONITRATE 60 MG/1
60 TABLET, EXTENDED RELEASE ORAL DAILY
Qty: 90 TABLET | Refills: 3 | Status: SHIPPED | OUTPATIENT
Start: 2021-04-12 | End: 2022-04-19

## 2021-04-12 RX ORDER — COLCHICINE 0.6 MG/1
TABLET ORAL
Qty: 3 TABLET | Refills: 0 | Status: SHIPPED | OUTPATIENT
Start: 2021-04-12

## 2021-04-12 RX ORDER — ATORVASTATIN CALCIUM 40 MG/1
40 TABLET, FILM COATED ORAL DAILY
Qty: 90 TABLET | Refills: 3 | Status: SHIPPED | OUTPATIENT
Start: 2021-04-12 | End: 2022-04-19

## 2021-04-12 RX ORDER — ENALAPRIL MALEATE 20 MG/1
20 TABLET ORAL DAILY
Qty: 90 TABLET | Refills: 3 | Status: SHIPPED | OUTPATIENT
Start: 2021-04-12 | End: 2021-05-05

## 2021-06-01 ENCOUNTER — TELEPHONE (OUTPATIENT)
Dept: ADMINISTRATIVE | Facility: CLINIC | Age: 86
End: 2021-06-01

## 2021-11-27 NOTE — PROGRESS NOTES
Patient's chart was reviewed.   Requested updates within Care Everywhere.  Immunizations reconciled.    Health Maintenance was updated.    
- - -

## 2022-02-21 ENCOUNTER — TELEPHONE (OUTPATIENT)
Dept: FAMILY MEDICINE | Facility: CLINIC | Age: 87
End: 2022-02-21
Payer: MEDICARE

## 2022-02-21 DIAGNOSIS — N18.30 TYPE 2 DIABETES MELLITUS WITH STAGE 3 CHRONIC KIDNEY DISEASE, WITHOUT LONG-TERM CURRENT USE OF INSULIN: ICD-10-CM

## 2022-02-21 DIAGNOSIS — E11.22 TYPE 2 DIABETES MELLITUS WITH STAGE 3 CHRONIC KIDNEY DISEASE, WITHOUT LONG-TERM CURRENT USE OF INSULIN: ICD-10-CM

## 2022-02-21 NOTE — TELEPHONE ENCOUNTER
Care Due:                  Date            Visit Type   Department     Provider  --------------------------------------------------------------------------------                                [facility]                              PRIMARY      MEDICINE/INTERN  Last Visit: 04-      CARE ([facility])   HASEEB Pena                              [facility]                              PRIMARY      MEDICINE/INTERN  Next Visit: 03-      CARE ([facility])   HASEEB Pena                                                            Last  Test          Frequency    Reason                     Performed    Due Date  --------------------------------------------------------------------------------    CBC.........  12 months..  clopidogreL..............  04- 04-    CMP.........  12 months..  atorvastatin, colchicine,   04- 04-                             enalapril, glipiZIDE,                             metFORMIN................    HBA1C.......  6 months...  glipiZIDE, metFORMIN.....  04-   10-    Lipid Panel.  12 months..  atorvastatin.............  04- 04-    Uric Acid...  12 months..  colchicine...............  Not Found    Overdue    Powered by [employer]. Reference number: [id number].   2/21/2022 3:57:06 PM CST

## 2022-02-23 RX ORDER — GLIPIZIDE 2.5 MG/1
2.5 TABLET, EXTENDED RELEASE ORAL
Qty: 30 TABLET | Refills: 0 | Status: SHIPPED | OUTPATIENT
Start: 2022-02-23 | End: 2022-03-07 | Stop reason: SDUPTHER

## 2022-02-23 RX ORDER — METFORMIN HYDROCHLORIDE 750 MG/1
750 TABLET, EXTENDED RELEASE ORAL
Qty: 30 TABLET | Refills: 0 | Status: SHIPPED | OUTPATIENT
Start: 2022-02-23 | End: 2022-03-07 | Stop reason: SDUPTHER

## 2022-03-07 ENCOUNTER — OFFICE VISIT (OUTPATIENT)
Dept: FAMILY MEDICINE | Facility: CLINIC | Age: 87
End: 2022-03-07
Payer: MEDICARE

## 2022-03-07 ENCOUNTER — LAB VISIT (OUTPATIENT)
Dept: LAB | Facility: HOSPITAL | Age: 87
End: 2022-03-07
Attending: INTERNAL MEDICINE
Payer: MEDICARE

## 2022-03-07 VITALS
SYSTOLIC BLOOD PRESSURE: 138 MMHG | OXYGEN SATURATION: 97 % | BODY MASS INDEX: 25.39 KG/M2 | WEIGHT: 143.31 LBS | HEART RATE: 67 BPM | TEMPERATURE: 98 F | HEIGHT: 63 IN | DIASTOLIC BLOOD PRESSURE: 64 MMHG

## 2022-03-07 DIAGNOSIS — N18.30 TYPE 2 DIABETES MELLITUS WITH STAGE 3 CHRONIC KIDNEY DISEASE, WITHOUT LONG-TERM CURRENT USE OF INSULIN, UNSPECIFIED WHETHER STAGE 3A OR 3B CKD: ICD-10-CM

## 2022-03-07 DIAGNOSIS — E11.51 TYPE 2 DIABETES MELLITUS WITH DIABETIC PERIPHERAL ANGIOPATHY WITHOUT GANGRENE, WITHOUT LONG-TERM CURRENT USE OF INSULIN: ICD-10-CM

## 2022-03-07 DIAGNOSIS — I25.118 ATHEROSCLEROTIC HEART DISEASE OF NATIVE CORONARY ARTERY WITH OTHER FORMS OF ANGINA PECTORIS: ICD-10-CM

## 2022-03-07 DIAGNOSIS — E11.22 TYPE 2 DIABETES MELLITUS WITH STAGE 3 CHRONIC KIDNEY DISEASE, WITHOUT LONG-TERM CURRENT USE OF INSULIN, UNSPECIFIED WHETHER STAGE 3A OR 3B CKD: ICD-10-CM

## 2022-03-07 PROCEDURE — 1159F MED LIST DOCD IN RCRD: CPT | Mod: CPTII,S$GLB,, | Performed by: INTERNAL MEDICINE

## 2022-03-07 PROCEDURE — 3288F FALL RISK ASSESSMENT DOCD: CPT | Mod: CPTII,S$GLB,, | Performed by: INTERNAL MEDICINE

## 2022-03-07 PROCEDURE — 99999 PR PBB SHADOW E&M-EST. PATIENT-LVL IV: CPT | Mod: PBBFAC,,, | Performed by: INTERNAL MEDICINE

## 2022-03-07 PROCEDURE — 82570 ASSAY OF URINE CREATININE: CPT | Performed by: INTERNAL MEDICINE

## 2022-03-07 PROCEDURE — 1159F PR MEDICATION LIST DOCUMENTED IN MEDICAL RECORD: ICD-10-PCS | Mod: CPTII,S$GLB,, | Performed by: INTERNAL MEDICINE

## 2022-03-07 PROCEDURE — 99214 OFFICE O/P EST MOD 30 MIN: CPT | Mod: S$GLB,,, | Performed by: INTERNAL MEDICINE

## 2022-03-07 PROCEDURE — 99499 UNLISTED E&M SERVICE: CPT | Mod: S$GLB,,, | Performed by: INTERNAL MEDICINE

## 2022-03-07 PROCEDURE — 1101F PT FALLS ASSESS-DOCD LE1/YR: CPT | Mod: CPTII,S$GLB,, | Performed by: INTERNAL MEDICINE

## 2022-03-07 PROCEDURE — 99214 PR OFFICE/OUTPT VISIT, EST, LEVL IV, 30-39 MIN: ICD-10-PCS | Mod: S$GLB,,, | Performed by: INTERNAL MEDICINE

## 2022-03-07 PROCEDURE — 3288F PR FALLS RISK ASSESSMENT DOCUMENTED: ICD-10-PCS | Mod: CPTII,S$GLB,, | Performed by: INTERNAL MEDICINE

## 2022-03-07 PROCEDURE — 1160F RVW MEDS BY RX/DR IN RCRD: CPT | Mod: CPTII,S$GLB,, | Performed by: INTERNAL MEDICINE

## 2022-03-07 PROCEDURE — 99999 PR PBB SHADOW E&M-EST. PATIENT-LVL IV: ICD-10-PCS | Mod: PBBFAC,,, | Performed by: INTERNAL MEDICINE

## 2022-03-07 PROCEDURE — 1160F PR REVIEW ALL MEDS BY PRESCRIBER/CLIN PHARMACIST DOCUMENTED: ICD-10-PCS | Mod: CPTII,S$GLB,, | Performed by: INTERNAL MEDICINE

## 2022-03-07 PROCEDURE — 99499 RISK ADDL DX/OHS AUDIT: ICD-10-PCS | Mod: S$GLB,,, | Performed by: INTERNAL MEDICINE

## 2022-03-07 PROCEDURE — 1101F PR PT FALLS ASSESS DOC 0-1 FALLS W/OUT INJ PAST YR: ICD-10-PCS | Mod: CPTII,S$GLB,, | Performed by: INTERNAL MEDICINE

## 2022-03-07 RX ORDER — GLIPIZIDE 2.5 MG/1
2.5 TABLET, EXTENDED RELEASE ORAL
Qty: 90 TABLET | Refills: 1 | Status: SHIPPED | OUTPATIENT
Start: 2022-03-07 | End: 2022-06-22

## 2022-03-07 RX ORDER — METFORMIN HYDROCHLORIDE 750 MG/1
750 TABLET, EXTENDED RELEASE ORAL
Qty: 90 TABLET | Refills: 1 | Status: SHIPPED | OUTPATIENT
Start: 2022-03-07 | End: 2022-07-22

## 2022-03-07 NOTE — PROGRESS NOTES
SUBJECTIVE     No chief complaint on file.      DIANE Marquez is a 89 y.o. female with multiple medical diagnoses as listed in the medical history and problem list that presents for follow-up for DM2. Pt has been doing well since her last visit except for some occasional dizziness.  She is fully compliant with meds and denies any adverse side effects.  She reports are fasting blood sugar levels are wnl usually at about 128. She is mostly compliant with an ADA diet.  Patient denies any hypoglycemic episodes.  She presents for med refills and is without any other complaints today.    PAST MEDICAL HISTORY:  Past Medical History:   Diagnosis Date    Coronary artery disease     Diabetes mellitus type I     Hyperlipidemia     Hypertension        PAST SURGICAL HISTORY:  Past Surgical History:   Procedure Laterality Date    ADENOIDECTOMY      CATARACT EXTRACTION, BILATERAL      CORONARY STENT PLACEMENT  08/21/2016    dr. ruiz       SOCIAL HISTORY:  Social History     Socioeconomic History    Marital status:    Tobacco Use    Smoking status: Never Smoker    Smokeless tobacco: Never Used       FAMILY HISTORY:  History reviewed. No pertinent family history.    ALLERGIES AND MEDICATIONS: updated and reviewed.  Review of patient's allergies indicates:   Allergen Reactions    Amoxicillin     Bactrim [sulfamethoxazole-trimethoprim]      Current Outpatient Medications   Medication Sig Dispense Refill    ascorbic acid, vitamin C, (VITAMIN C) 100 MG tablet Take 500 mg by mouth once daily.      aspirin (ECOTRIN) 81 MG EC tablet Take 81 mg by mouth once daily.      atorvastatin (LIPITOR) 40 MG tablet Take 1 tablet (40 mg total) by mouth once daily. 90 tablet 3    azelastine (ASTELIN) 137 mcg (0.1 %) nasal spray 1 spray (137 mcg total) by Nasal route 2 (two) times daily. 30 mL 3    blood sugar diagnostic Strp Check  each 3    clopidogreL (PLAVIX) 75 mg tablet Take 1 tablet (75 mg total) by  mouth once daily. 90 tablet 3    colchicine (COLCRYS) 0.6 mg tablet Take 2 tablets by mouth, then one hour later take 1 additional tablet 3 tablet 0    enalapril (VASOTEC) 20 MG tablet TAKE 1 TABLET(20 MG) BY MOUTH EVERY DAY 90 tablet 2    fluticasone propionate (FLONASE) 50 mcg/actuation nasal spray 1 spray (50 mcg total) by Each Nostril route 2 (two) times daily. 18.2 mL 3    ibuprofen (ADVIL,MOTRIN) 400 MG tablet       isosorbide mononitrate (IMDUR) 60 MG 24 hr tablet Take 1 tablet (60 mg total) by mouth once daily. 90 tablet 3    metoprolol succinate (TOPROL-XL) 25 MG 24 hr tablet Take 1 tablet (25 mg total) by mouth once daily. 90 tablet 1    nitroGLYCERIN (NITROSTAT) 0.3 MG SL tablet Place 1 tablet (0.3 mg total) under the tongue every 5 (five) minutes as needed for Chest pain. 25 tablet 1    PREVIDENT 5000 ENAMEL PROTECT 1.1-5 % Pste       senna-docusate 8.6-50 mg (PERICOLACE) 8.6-50 mg per tablet Take 1 tablet by mouth once daily.      glipiZIDE (GLUCOTROL) 2.5 MG TR24 Take 1 tablet (2.5 mg total) by mouth daily with breakfast. 90 tablet 1    metFORMIN (GLUCOPHAGE-XR) 750 MG ER 24hr tablet Take 1 tablet (750 mg total) by mouth daily with breakfast. 90 tablet 1     No current facility-administered medications for this visit.       ROS  Review of Systems   Constitutional: Negative for chills and fever.   HENT: Negative for hearing loss and sore throat.    Eyes: Negative for visual disturbance.   Respiratory: Negative for cough and shortness of breath.    Cardiovascular: Negative for chest pain, palpitations and leg swelling.   Gastrointestinal: Negative for abdominal pain, constipation, diarrhea, nausea and vomiting.   Genitourinary: Negative for dysuria, frequency and urgency.   Musculoskeletal: Negative for arthralgias, joint swelling and myalgias.   Skin: Negative for rash and wound.   Neurological: Positive for dizziness. Negative for headaches.   Psychiatric/Behavioral: Negative for agitation  "and confusion. The patient is not nervous/anxious.          OBJECTIVE     Physical Exam  Vitals:    03/07/22 1308   BP: 138/64   Pulse: 67   Temp: 97.9 °F (36.6 °C)    Body mass index is 25.38 kg/m².  Weight: 65 kg (143 lb 4.8 oz)   Height: 5' 3" (160 cm)     Physical Exam  Constitutional:       General: She is not in acute distress.     Appearance: She is well-developed.   HENT:      Head: Normocephalic and atraumatic.      Right Ear: External ear normal.      Left Ear: External ear normal.      Nose: Nose normal.   Eyes:      General: No scleral icterus.        Right eye: No discharge.         Left eye: No discharge.      Conjunctiva/sclera: Conjunctivae normal.   Neck:      Vascular: No JVD.      Trachea: No tracheal deviation.   Cardiovascular:      Rate and Rhythm: Normal rate and regular rhythm.      Heart sounds: No murmur heard.    No friction rub. No gallop.   Pulmonary:      Effort: Pulmonary effort is normal. No respiratory distress.      Breath sounds: Normal breath sounds. No wheezing.   Abdominal:      General: Bowel sounds are normal. There is no distension.      Palpations: Abdomen is soft. There is no mass.      Tenderness: There is no abdominal tenderness. There is no guarding or rebound.   Musculoskeletal:         General: No tenderness or deformity. Normal range of motion.      Cervical back: Normal range of motion and neck supple.   Skin:     General: Skin is warm and dry.      Findings: No erythema or rash.   Neurological:      Mental Status: She is alert and oriented to person, place, and time.      Motor: No abnormal muscle tone.      Coordination: Coordination normal.   Psychiatric:         Behavior: Behavior normal.         Thought Content: Thought content normal.         Judgment: Judgment normal.           Health Maintenance       Date Due Completion Date    TETANUS VACCINE 09/07/2015 9/7/2005    Shingles Vaccine (2 of 3) 09/21/2015 7/27/2015    COVID-19 Vaccine (3 - Booster for Pfizer " series) 08/10/2021 3/10/2021    Hemoglobin A1c 10/07/2021 4/7/2021    Aspirin/Antiplatelet Therapy 04/12/2022 4/12/2021            ASSESSMENT     89 y.o. female with     1. Type 2 diabetes mellitus with stage 3 chronic kidney disease, without long-term current use of insulin, unspecified whether stage 3a or 3b CKD    2. Atherosclerotic heart disease of native coronary artery with other forms of angina pectoris    3. Type 2 diabetes mellitus with diabetic peripheral angiopathy without gangrene, without long-term current use of insulin        PLAN:     1. Type 2 diabetes mellitus with stage 3 chronic kidney disease, without long-term current use of insulin  - Check labs  - metFORMIN (GLUCOPHAGE-XR) 750 MG ER 24hr tablet; Take 1 tablet (750 mg total) by mouth daily with breakfast.  Dispense: 90 tablet; Refill: 1  - glipiZIDE (GLUCOTROL) 2.5 MG TR24; Take 1 tablet (2.5 mg total) by mouth daily with breakfast.  Dispense: 90 tablet; Refill: 1  - CBC Auto Differential; Future  - Comprehensive Metabolic Panel; Future  - Hemoglobin A1C; Future    2. Atherosclerotic heart disease of native coronary artery with other forms of angina pectoris  - Stable; no acute issues    3. Type 2 diabetes mellitus with diabetic peripheral angiopathy without gangrene, without long-term current use of insulin  - Microalbumin/Creatinine Ratio, Urine; Future  - DIABETIC SHOES FOR HOME USE        RTC in 6 months     Gretchen Pena MD  03/07/2022 1:28 PM        Follow up in about 6 months (around 9/7/2022).

## 2022-03-08 PROBLEM — R80.9 TYPE 2 DIABETES MELLITUS WITH MICROALBUMINURIA: Status: ACTIVE | Noted: 2022-03-08

## 2022-03-08 PROBLEM — E11.29 TYPE 2 DIABETES MELLITUS WITH MICROALBUMINURIA: Status: ACTIVE | Noted: 2022-03-08

## 2022-03-08 LAB
ALBUMIN/CREAT UR: 35.5 UG/MG (ref 0–30)
CREAT UR-MCNC: 31 MG/DL (ref 15–325)
MICROALBUMIN UR DL<=1MG/L-MCNC: 11 UG/ML

## 2022-03-09 ENCOUNTER — TELEPHONE (OUTPATIENT)
Dept: FAMILY MEDICINE | Facility: CLINIC | Age: 87
End: 2022-03-09
Payer: MEDICARE

## 2022-04-18 DIAGNOSIS — I20.89 STABLE ANGINA: ICD-10-CM

## 2022-04-18 NOTE — TELEPHONE ENCOUNTER
No new care gaps identified.  Powered by [email or web address]. Reference number: [id number].   4/18/2022 6:56:59 PM CDT

## 2022-04-19 RX ORDER — ATORVASTATIN CALCIUM 40 MG/1
40 TABLET, FILM COATED ORAL DAILY
Qty: 90 TABLET | Refills: 3 | Status: SHIPPED | OUTPATIENT
Start: 2022-04-19 | End: 2023-02-10 | Stop reason: SDUPTHER

## 2022-04-19 RX ORDER — ISOSORBIDE MONONITRATE 60 MG/1
60 TABLET, EXTENDED RELEASE ORAL DAILY
Qty: 90 TABLET | Refills: 3 | Status: SHIPPED | OUTPATIENT
Start: 2022-04-19 | End: 2023-02-10 | Stop reason: SDUPTHER

## 2022-04-19 NOTE — TELEPHONE ENCOUNTER
Refill Routing Note   Medication(s) are not appropriate for processing by [facility] for the following reason(s):      - Required laboratory values are outdated               Medication reconciliation completed: No     Appointments  past 12m or future 3m with PCP    Date Provider   Last Visit   3/7/2022 Gretchen Pena MD   Next Visit   Visit date not found Gretchen Pena MD

## 2022-04-29 ENCOUNTER — TELEPHONE (OUTPATIENT)
Dept: ENDOCRINOLOGY | Facility: CLINIC | Age: 87
End: 2022-04-29
Payer: MEDICARE

## 2022-04-29 NOTE — TELEPHONE ENCOUNTER
----- Message from Selin Zelaya MA sent at 4/29/2022 12:41 PM CDT -----  Type: Patient Call Back    Who called:self    What is the request in detail:pt. Is asking for someone to call her in regards to diabetic teaching she is wanting .. she is asking to come in to the office and be taught instead of at home ..     Can the clinic reply by [facility]?no    Would the patient rather a call back or a response via [facility]? yes    Best call back number:[phone] (home)

## 2022-05-17 DIAGNOSIS — I20.89 STABLE ANGINA: ICD-10-CM

## 2022-05-17 DIAGNOSIS — E11.51 TYPE 2 DIABETES MELLITUS WITH DIABETIC PERIPHERAL ANGIOPATHY WITHOUT GANGRENE, WITHOUT LONG-TERM CURRENT USE OF INSULIN: ICD-10-CM

## 2022-05-17 RX ORDER — CLOPIDOGREL BISULFATE 75 MG/1
75 TABLET ORAL DAILY
Qty: 90 TABLET | Refills: 2 | Status: SHIPPED | OUTPATIENT
Start: 2022-05-17 | End: 2023-02-10 | Stop reason: SDUPTHER

## 2022-05-17 RX ORDER — ENALAPRIL MALEATE 20 MG/1
TABLET ORAL
Qty: 90 TABLET | Refills: 2 | Status: SHIPPED | OUTPATIENT
Start: 2022-05-17 | End: 2023-02-10 | Stop reason: SDUPTHER

## 2022-05-17 NOTE — TELEPHONE ENCOUNTER
Care Due:                  Date            Visit Type   Department     Provider  --------------------------------------------------------------------------------                                [facility]                              PRIMARY      MEDICINE/INTERN  Last Visit: 03-      CARE ([facility])   AL MED         Gretchen Pena  Next Visit: None Scheduled  None         None Found                                                            Last  Test          Frequency    Reason                     Performed    Due Date  --------------------------------------------------------------------------------    Lipid Panel.  12 months..  atorvastatin.............  04- 04-    Uric Acid...  12 months..  colchicine...............  Not Found    Overdue    [employer] Embedded Care Gaps. Reference number: [id number]. 5/17/2022   9:12:43 AM CDT

## 2022-05-17 NOTE — TELEPHONE ENCOUNTER
Last Office Visit Info:   The patient's last visit with Gretchen Pena MD was on 3/7/2022.    The patient's last visit in current department was on 3/7/2022.        Last CBC Results:   Lab Results   Component Value Date    WBC 7.89 03/07/2022    HGB 11.3 (L) 03/07/2022    HCT 35.7 (L) 03/07/2022     03/07/2022       Last CMP Results  Lab Results   Component Value Date     03/07/2022    K 4.9 03/07/2022     03/07/2022    CO2 21 (L) 03/07/2022    BUN 36 (H) 03/07/2022    CREATININE 1.2 03/07/2022    CALCIUM 10.0 03/07/2022    ALBUMIN 4.0 03/07/2022    AST 15 03/07/2022    ALT 14 03/07/2022       Last Lipids  Lab Results   Component Value Date    CHOL 111 (L) 04/07/2021    TRIG 175 (H) 04/07/2021    HDL 48 04/07/2021    LDLCALC 28.0 (L) 04/07/2021       Last A1C  Lab Results   Component Value Date    HGBA1C 6.6 (H) 03/07/2022       Last TSH  Lab Results   Component Value Date    TSH 0.639 04/07/2021             Current Med Refills  Medication List with Changes/Refills   Current Medications    ASCORBIC ACID, VITAMIN C, (VITAMIN C) 100 MG TABLET    Take 500 mg by mouth once daily.       Start Date: --        End Date: --    ASPIRIN (ECOTRIN) 81 MG EC TABLET    Take 81 mg by mouth once daily.       Start Date: --        End Date: --    ATORVASTATIN (LIPITOR) 40 MG TABLET    TAKE 1 TABLET (40 MG TOTAL) BY MOUTH ONCE DAILY.       Start Date: 4/19/2022 End Date: --    AZELASTINE (ASTELIN) 137 MCG (0.1 %) NASAL SPRAY    1 spray (137 mcg total) by Nasal route 2 (two) times daily.       Start Date: 8/4/2020  End Date: --    BLOOD SUGAR DIAGNOSTIC STRP    Check BID       Start Date: 6/12/2017 End Date: --    CLOPIDOGREL (PLAVIX) 75 MG TABLET    Take 1 tablet (75 mg total) by mouth once daily.       Start Date: 4/12/2021 End Date: --    COLCHICINE (COLCRYS) 0.6 MG TABLET    Take 2 tablets by mouth, then one hour later take 1 additional tablet       Start Date: 4/12/2021 End Date: --    ENALAPRIL (VASOTEC) 20  MG TABLET    TAKE 1 TABLET(20 MG) BY MOUTH EVERY DAY       Start Date: 5/5/2021  End Date: --    FLUTICASONE PROPIONATE (FLONASE) 50 MCG/ACTUATION NASAL SPRAY    1 spray (50 mcg total) by Each Nostril route 2 (two) times daily.       Start Date: 8/4/2020  End Date: --    GLIPIZIDE (GLUCOTROL) 2.5 MG TR24    Take 1 tablet (2.5 mg total) by mouth daily with breakfast.       Start Date: 3/7/2022  End Date: --    IBUPROFEN (ADVIL,MOTRIN) 400 MG TABLET           Start Date: 11/7/2017 End Date: --    ISOSORBIDE MONONITRATE (IMDUR) 60 MG 24 HR TABLET    TAKE 1 TABLET (60 MG TOTAL) BY MOUTH ONCE DAILY.       Start Date: 4/19/2022 End Date: --    METFORMIN (GLUCOPHAGE-XR) 750 MG ER 24HR TABLET    Take 1 tablet (750 mg total) by mouth daily with breakfast.       Start Date: 3/7/2022  End Date: 3/7/2023    METOPROLOL SUCCINATE (TOPROL-XL) 25 MG 24 HR TABLET    Take 1 tablet (25 mg total) by mouth once daily.       Start Date: 10/26/2020End Date: --    NITROGLYCERIN (NITROSTAT) 0.3 MG SL TABLET    Place 1 tablet (0.3 mg total) under the tongue every 5 (five) minutes as needed for Chest pain.       Start Date: 6/12/2017 End Date: --    PREVIDENT 5000 ENAMEL PROTECT 1.1-5 % PSTE           Start Date: 2/14/2021 End Date: --    SENNA-DOCUSATE 8.6-50 MG (PERICOLACE) 8.6-50 MG PER TABLET    Take 1 tablet by mouth once daily.       Start Date: --        End Date: --

## 2022-06-21 DIAGNOSIS — E11.22 TYPE 2 DIABETES MELLITUS WITH STAGE 3 CHRONIC KIDNEY DISEASE, WITHOUT LONG-TERM CURRENT USE OF INSULIN, UNSPECIFIED WHETHER STAGE 3A OR 3B CKD: ICD-10-CM

## 2022-06-21 DIAGNOSIS — N18.30 TYPE 2 DIABETES MELLITUS WITH STAGE 3 CHRONIC KIDNEY DISEASE, WITHOUT LONG-TERM CURRENT USE OF INSULIN, UNSPECIFIED WHETHER STAGE 3A OR 3B CKD: ICD-10-CM

## 2022-06-21 NOTE — TELEPHONE ENCOUNTER
Care Due:                  Date            Visit Type   Department     Provider  --------------------------------------------------------------------------------                                [facility]                              PRIMARY      MEDICINE/INTERN  Last Visit: 03-      CARE ([facility])   AL MED         Gretchen Pena  Next Visit: None Scheduled  None         None Found                                                            Last  Test          Frequency    Reason                     Performed    Due Date  --------------------------------------------------------------------------------    HBA1C.......  6 months...  glipiZIDE, metFORMIN.....  03- 09-    [facility] Embedded Care Gaps. Reference number: [id number]. 6/21/2022   2:58:39 PM CDT

## 2022-06-21 NOTE — TELEPHONE ENCOUNTER
Refill Routing Note   Medication(s) are not appropriate for processing by [facility] for the following reason(s):      - Required laboratory values are abnormal    [facility] action(s):  Defer Medication-related problems identified: Requires labs        Medication reconciliation completed: No     Appointments  past 12m or future 3m with PCP    Date Provider   Last Visit   3/7/2022 Gretchen Pena MD   Next Visit   Visit date not found Gretchen Pena MD   ED visits in past 90 days: 0        Note composed:6:38 PM 06/21/2022

## 2022-06-22 RX ORDER — GLIPIZIDE 2.5 MG/1
2.5 TABLET, EXTENDED RELEASE ORAL
Qty: 90 TABLET | Refills: 0 | Status: SHIPPED | OUTPATIENT
Start: 2022-06-22 | End: 2022-07-22

## 2022-07-08 LAB
LEFT EYE DM RETINOPATHY: NEGATIVE
RIGHT EYE DM RETINOPATHY: NEGATIVE

## 2022-07-11 ENCOUNTER — PATIENT OUTREACH (OUTPATIENT)
Dept: ADMINISTRATIVE | Facility: HOSPITAL | Age: 87
End: 2022-07-11
Payer: MEDICARE

## 2022-07-20 ENCOUNTER — LAB VISIT (OUTPATIENT)
Dept: LAB | Facility: HOSPITAL | Age: 87
End: 2022-07-20
Attending: NURSE PRACTITIONER
Payer: MEDICARE

## 2022-07-20 ENCOUNTER — OFFICE VISIT (OUTPATIENT)
Dept: ENDOCRINOLOGY | Facility: CLINIC | Age: 87
End: 2022-07-20
Payer: MEDICARE

## 2022-07-20 VITALS
HEART RATE: 81 BPM | TEMPERATURE: 98 F | DIASTOLIC BLOOD PRESSURE: 82 MMHG | SYSTOLIC BLOOD PRESSURE: 139 MMHG | WEIGHT: 139.81 LBS | BODY MASS INDEX: 24.76 KG/M2

## 2022-07-20 DIAGNOSIS — N18.31 TYPE 2 DIABETES MELLITUS WITH STAGE 3A CHRONIC KIDNEY DISEASE, WITHOUT LONG-TERM CURRENT USE OF INSULIN: ICD-10-CM

## 2022-07-20 DIAGNOSIS — E78.5 HYPERLIPIDEMIA, UNSPECIFIED HYPERLIPIDEMIA TYPE: ICD-10-CM

## 2022-07-20 DIAGNOSIS — N18.31 TYPE 2 DIABETES MELLITUS WITH STAGE 3A CHRONIC KIDNEY DISEASE, WITHOUT LONG-TERM CURRENT USE OF INSULIN: Primary | ICD-10-CM

## 2022-07-20 DIAGNOSIS — E11.51 TYPE 2 DIABETES MELLITUS WITH DIABETIC PERIPHERAL ANGIOPATHY WITHOUT GANGRENE, WITHOUT LONG-TERM CURRENT USE OF INSULIN: ICD-10-CM

## 2022-07-20 DIAGNOSIS — I10 HYPERTENSION, UNSPECIFIED TYPE: ICD-10-CM

## 2022-07-20 DIAGNOSIS — E11.22 TYPE 2 DIABETES MELLITUS WITH STAGE 3A CHRONIC KIDNEY DISEASE, WITHOUT LONG-TERM CURRENT USE OF INSULIN: ICD-10-CM

## 2022-07-20 DIAGNOSIS — E11.22 TYPE 2 DIABETES MELLITUS WITH STAGE 3A CHRONIC KIDNEY DISEASE, WITHOUT LONG-TERM CURRENT USE OF INSULIN: Primary | ICD-10-CM

## 2022-07-20 LAB
ESTIMATED AVG GLUCOSE: 137 MG/DL (ref 68–131)
HBA1C MFR BLD: 6.4 % (ref 4–5.6)

## 2022-07-20 PROCEDURE — 1160F RVW MEDS BY RX/DR IN RCRD: CPT | Mod: CPTII,S$GLB,, | Performed by: NURSE PRACTITIONER

## 2022-07-20 PROCEDURE — 99499 RISK ADDL DX/OHS AUDIT: ICD-10-PCS | Mod: S$GLB,,, | Performed by: NURSE PRACTITIONER

## 2022-07-20 PROCEDURE — 1160F PR REVIEW ALL MEDS BY PRESCRIBER/CLIN PHARMACIST DOCUMENTED: ICD-10-PCS | Mod: CPTII,S$GLB,, | Performed by: NURSE PRACTITIONER

## 2022-07-20 PROCEDURE — 99999 PR PBB SHADOW E&M-EST. PATIENT-LVL IV: ICD-10-PCS | Mod: PBBFAC,,, | Performed by: NURSE PRACTITIONER

## 2022-07-20 PROCEDURE — 1159F MED LIST DOCD IN RCRD: CPT | Mod: CPTII,S$GLB,, | Performed by: NURSE PRACTITIONER

## 2022-07-20 PROCEDURE — 99214 OFFICE O/P EST MOD 30 MIN: CPT | Mod: S$GLB,,, | Performed by: NURSE PRACTITIONER

## 2022-07-20 PROCEDURE — 99214 PR OFFICE/OUTPT VISIT, EST, LEVL IV, 30-39 MIN: ICD-10-PCS | Mod: S$GLB,,, | Performed by: NURSE PRACTITIONER

## 2022-07-20 PROCEDURE — 99499 UNLISTED E&M SERVICE: CPT | Mod: S$GLB,,, | Performed by: NURSE PRACTITIONER

## 2022-07-20 PROCEDURE — 99999 PR PBB SHADOW E&M-EST. PATIENT-LVL IV: CPT | Mod: PBBFAC,,, | Performed by: NURSE PRACTITIONER

## 2022-07-20 PROCEDURE — 36415 COLL VENOUS BLD VENIPUNCTURE: CPT | Performed by: NURSE PRACTITIONER

## 2022-07-20 PROCEDURE — 83036 HEMOGLOBIN GLYCOSYLATED A1C: CPT | Performed by: NURSE PRACTITIONER

## 2022-07-20 PROCEDURE — 1126F AMNT PAIN NOTED NONE PRSNT: CPT | Mod: CPTII,S$GLB,, | Performed by: NURSE PRACTITIONER

## 2022-07-20 PROCEDURE — 1126F PR PAIN SEVERITY QUANTIFIED, NO PAIN PRESENT: ICD-10-PCS | Mod: CPTII,S$GLB,, | Performed by: NURSE PRACTITIONER

## 2022-07-20 PROCEDURE — 1159F PR MEDICATION LIST DOCUMENTED IN MEDICAL RECORD: ICD-10-PCS | Mod: CPTII,S$GLB,, | Performed by: NURSE PRACTITIONER

## 2022-07-20 RX ORDER — METOPROLOL SUCCINATE 50 MG/1
TABLET, EXTENDED RELEASE ORAL
COMMUNITY
Start: 2022-06-27 | End: 2023-02-10

## 2022-07-20 NOTE — PROGRESS NOTES
CC: This 89 y.o. White female  is here for evaluation of  T2DM along with comorbidities indicated in the Visit Diagnosis section of this encounter.    HPI: Tonya Marquez was diagnosed with T2DM in her 70s.   Dr. Byers - cardiologist     DM COMPLICATIONS: nephropathy and cardiovascular disease  Medical hx: MI, CKD stage 3     Last seen by Dr. Gomez in 11/2020 for abnormal thyroid lab work. His workup was ultimately reassuring.   She returns today to Endocrine for diabetes and to get personal CGM.   She is stressed out because of financial and legal issues with her bank. She would like to return to [place] to settle matters this year.     LAST DIABETES EDUCATION: 6/2020    SIGNIFICANT DIABETES MED HISTORY: denies     PRESCRIBED DIABETES MEDICATIONS:   metformin  mg once daily in AM with breakfast   Glipizide XL 2.5 mg once daily in AM with breakfast     Misses medication doses - No        SELF MONITORING BLOOD GLUCOSE: Checks blood glucose at home with true metrix meter. Resumed testing 4 days ago when she learned how to change her battery. FBGs have been 130s     HYPOGLYCEMIC EPISODES: rare      CURRENT DIET: eats 3 meals/day.     CURRENT EXERCISE: goes to class at [employer] 2x/week       /82   Pulse 81   Temp 98.2 °F (36.8 °C)   Wt 63.4 kg (139 lb 12.8 oz)   BMI 24.76 kg/m²     ROS:   CONSTITUTIONAL: Appetite good, denies fatigue  GI: No nausea, vomiting, or diarrhea  NEURO: + dizziness   OTHER: n/a      PHYSICAL EXAM:  GENERAL: Well developed, well nourished. No acute distress.   PSYCH: AAOx3, appropriate mood and affect, conversant, well-groomed. Judgement and insight good.   NEURO: Cranial nerves grossly intact. Speech clear, no tremor.   CHEST: Respirations even and unlabored.        Hemoglobin A1C   Date Value Ref Range Status   03/07/2022 6.6 (H) 4.0 - 5.6 % Final     Comment:     ADA Screening Guidelines:  5.7-6.4%  Consistent with prediabetes  >or=6.5%  Consistent with  diabetes    High levels of fetal hemoglobin interfere with the HbA1C  assay. Heterozygous hemoglobin variants (HbS, HgC, etc)do  not significantly interfere with this assay.   However, presence of multiple variants may affect accuracy.     04/07/2021 6.7 (H) 4.0 - 5.6 % Final     Comment:     ADA Screening Guidelines:  5.7-6.4%  Consistent with prediabetes  >or=6.5%  Consistent with diabetes    High levels of fetal hemoglobin interfere with the HbA1C  assay. Heterozygous hemoglobin variants (HbS, HgC, etc)do  not significantly interfere with this assay.   However, presence of multiple variants may affect accuracy.     10/01/2020 6.8 (H) 4.0 - 5.6 % Final     Comment:     ADA Screening Guidelines:  5.7-6.4%  Consistent with prediabetes  >or=6.5%  Consistent with diabetes  High levels of fetal hemoglobin interfere with the HbA1C  assay. Heterozygous hemoglobin variants (HbS, HgC, etc)do  not significantly interfere with this assay.   However, presence of multiple variants may affect accuracy.         No results found for: CPEPTIDE, GLUTAMICACID, ISLETCELLANT, FRUCTOSAMINE     Lab Results   Component Value Date    CHOL 111 (L) 04/07/2021    CHOL 125 06/05/2020    CHOL 121 03/25/2019     Lab Results   Component Value Date    HDL 48 04/07/2021    HDL 46 06/05/2020    HDL 50 03/25/2019     Lab Results   Component Value Date    LDLCALC 28.0 (L) 04/07/2021    LDLCALC 47.4 (L) 06/05/2020    LDLCALC 37.2 (L) 03/25/2019     Lab Results   Component Value Date    TRIG 175 (H) 04/07/2021    TRIG 158 (H) 06/05/2020    TRIG 169 (H) 03/25/2019     Lab Results   Component Value Date    CHOLHDL 43.2 04/07/2021    CHOLHDL 36.8 06/05/2020    CHOLHDL 41.3 03/25/2019         Chemistry        Component Value Date/Time     03/07/2022 1330    K 4.9 03/07/2022 1330     03/07/2022 1330    CO2 21 (L) 03/07/2022 1330    BUN 36 (H) 03/07/2022 1330    CREATININE 1.2 03/07/2022 1330     (H) 03/07/2022 1330        Component Value  Date/Time    CALCIUM 10.0 03/07/2022 1330    ALKPHOS 88 03/07/2022 1330    AST 15 03/07/2022 1330    ALT 14 03/07/2022 1330    BILITOT 0.4 03/07/2022 1330    ESTGFRAFRICA 45 (A) 03/07/2022 1330    EGFRNONAA 39 (A) 03/07/2022 1330              Lab Results   Component Value Date    LABMICR 11.0 03/07/2022    CREATRANDUR 31.0 03/07/2022    MICALBCREAT 35.5 (H) 03/07/2022             ASSESSMENT and PLAN:    A1C GOAL:     1. Type 2 diabetes mellitus with stage 3a chronic kidney disease, without long-term current use of insulin  Pt declines Randal d/t cost.   Discussed risks of current medications - lactic acidosis with metformin and hypoglycemia with glipizide, especially in light of advanced age and kidney disease.   Stop metformin and glipizide.   Start Januvia at renal dose of 50 mg once daily.   A1c today.     Test glucose 1x/day - fasting or predinner/bedtime.   A1c again 3 months.   Return to clinic in 3 months after a1c.     Hemoglobin A1C   2. Hyperlipidemia, unspecified hyperlipidemia type  Lipid Panel   3. Hypertension, unspecified type  Controlled    4.  dizziness See Neurology, as rec'd by Cardiology        Orders Placed This Encounter   Procedures    Hemoglobin A1C     Standing Status:   Future     Standing Expiration Date:   9/18/2023    Lipid Panel     Standing Status:   Future     Standing Expiration Date:   7/20/2023    Hemoglobin A1C     Standing Status:   Future     Standing Expiration Date:   9/18/2023        Follow up in about 3 months (around 10/20/2022).     Thank you very much for allowing me to participate in [facility]'s care.

## 2022-07-20 NOTE — PATIENT INSTRUCTIONS
Dmitry meng/t cost.   Discussed risks of current medications - lactic acidosis with metformin and hypoglycemia with glipizide, especially in light of advanced age and kidney disease.   Stop metformin and glipizide.   Start Januvia at renal dose of 50 mg once daily.   Test glucose 1x/day - fasting or predinner/bedtime.   A1c today.   A1c again 3 months.   Return to clinic in 3 months after a1c.

## 2022-07-22 ENCOUNTER — TELEPHONE (OUTPATIENT)
Dept: ENDOCRINOLOGY | Facility: CLINIC | Age: 87
End: 2022-07-22
Payer: MEDICARE

## 2022-07-22 DIAGNOSIS — E11.22 TYPE 2 DIABETES MELLITUS WITH STAGE 3A CHRONIC KIDNEY DISEASE, WITHOUT LONG-TERM CURRENT USE OF INSULIN: Primary | ICD-10-CM

## 2022-07-22 DIAGNOSIS — N18.31 TYPE 2 DIABETES MELLITUS WITH STAGE 3A CHRONIC KIDNEY DISEASE, WITHOUT LONG-TERM CURRENT USE OF INSULIN: Primary | ICD-10-CM

## 2022-07-22 NOTE — TELEPHONE ENCOUNTER
----- Message from Krista Osuna NP sent at 7/21/2022  8:14 AM CDT -----  A1c is even lower now at 6.4% which shows average glucoses have been around 137. Please stop metformin and glipizide and start Januvia as previously discussed.

## 2022-07-22 NOTE — TELEPHONE ENCOUNTER
----- Message from Ceci Ray sent at 7/22/2022  1:18 PM CDT -----  Regarding: self  .Type: Patient Call Back    Who called: self     What is the request in detail: states she has questions regarding meds.     Can the clinic reply by [facility]? No     Would the patient rather a call back or a response via [facility]?  Call     Best call back number: .[phone]

## 2022-08-26 ENCOUNTER — PES CALL (OUTPATIENT)
Dept: ADMINISTRATIVE | Facility: CLINIC | Age: 87
End: 2022-08-26
Payer: MEDICARE

## 2022-10-17 DIAGNOSIS — E11.22 TYPE 2 DIABETES MELLITUS WITH STAGE 3A CHRONIC KIDNEY DISEASE, WITHOUT LONG-TERM CURRENT USE OF INSULIN: ICD-10-CM

## 2022-10-17 DIAGNOSIS — N18.31 TYPE 2 DIABETES MELLITUS WITH STAGE 3A CHRONIC KIDNEY DISEASE, WITHOUT LONG-TERM CURRENT USE OF INSULIN: ICD-10-CM

## 2022-10-17 RX ORDER — SITAGLIPTIN 50 MG/1
TABLET, FILM COATED ORAL
Qty: 90 TABLET | Refills: 0 | Status: SHIPPED | OUTPATIENT
Start: 2022-10-17 | End: 2023-01-19 | Stop reason: SDUPTHER

## 2022-10-17 NOTE — TELEPHONE ENCOUNTER
"Patient states that she does not want to make a follow up appointment at this time because she was waiting on three separate occasions for someone to go to her house for diabetic teaching and they never showed up so she would like to go back to her primary care provider to "start from scratch"  "

## 2023-02-10 PROBLEM — E78.5 HYPERLIPIDEMIA ASSOCIATED WITH TYPE 2 DIABETES MELLITUS: Status: ACTIVE | Noted: 2017-06-02

## 2023-02-10 PROBLEM — E11.69 HYPERLIPIDEMIA ASSOCIATED WITH TYPE 2 DIABETES MELLITUS: Status: ACTIVE | Noted: 2017-06-02

## 2023-02-13 ENCOUNTER — TELEPHONE (OUTPATIENT)
Dept: FAMILY MEDICINE | Facility: CLINIC | Age: 88
End: 2023-02-13
Payer: MEDICARE

## 2023-02-13 NOTE — TELEPHONE ENCOUNTER
----- Message from Ana San sent at 2/13/2023  2:03 PM CST -----  Regarding: [phone]  Type: Patient Call Back    Who called: self     What is the request in detail: pt went to try to get labs drawn but they were unable to get her veins. She is not feeling well right now and would like to know if its ok to go next week.    Can the clinic reply by [facility]? no    Would the patient rather a call back or a response via [facility]? Call back     Best call back number: [phone]

## 2023-02-13 NOTE — TELEPHONE ENCOUNTER
Call returned. Current medication list placed at  for patient . Patient states they were unable to find a vein last time she attempted to have labs drawn. She says she will complete soon.

## 2023-02-13 NOTE — TELEPHONE ENCOUNTER
Call returned. Patient advised it will be okay if she completed her labs next week. Current medication list requested by patient mailed instead of leaving up front per patient request.

## 2023-02-13 NOTE — TELEPHONE ENCOUNTER
----- Message from Bonnie Ferraro sent at 2/13/2023 12:35 PM CST -----  Regarding: pt [phone]  Type: Patient Call Back    Who called:pt     What is the request in detail:patient says that she was trying to get lab work done but the lab could not find a vein and she was told that she can try another location but the pt states that she was exposed to covid and would like a call back on what she should do     Can the clinic reply by [facility]?    Would the patient rather a call back or a response via [facility]?    Best call back number: [phone] land     Additional Information:

## 2023-02-15 ENCOUNTER — LAB VISIT (OUTPATIENT)
Dept: LAB | Facility: HOSPITAL | Age: 88
End: 2023-02-15
Attending: INTERNAL MEDICINE
Payer: MEDICARE

## 2023-02-15 DIAGNOSIS — E78.5 HYPERLIPIDEMIA ASSOCIATED WITH TYPE 2 DIABETES MELLITUS: ICD-10-CM

## 2023-02-15 DIAGNOSIS — E11.22 TYPE 2 DIABETES MELLITUS WITH STAGE 3A CHRONIC KIDNEY DISEASE, WITHOUT LONG-TERM CURRENT USE OF INSULIN: ICD-10-CM

## 2023-02-15 DIAGNOSIS — E11.69 HYPERLIPIDEMIA ASSOCIATED WITH TYPE 2 DIABETES MELLITUS: ICD-10-CM

## 2023-02-15 DIAGNOSIS — N18.31 TYPE 2 DIABETES MELLITUS WITH STAGE 3A CHRONIC KIDNEY DISEASE, WITHOUT LONG-TERM CURRENT USE OF INSULIN: ICD-10-CM

## 2023-02-15 LAB
ALBUMIN SERPL BCP-MCNC: 3.4 G/DL (ref 3.5–5.2)
ALP SERPL-CCNC: 88 U/L (ref 55–135)
ALT SERPL W/O P-5'-P-CCNC: 15 U/L (ref 10–44)
ANION GAP SERPL CALC-SCNC: 9 MMOL/L (ref 8–16)
AST SERPL-CCNC: 18 U/L (ref 10–40)
BASOPHILS # BLD AUTO: 0.01 K/UL (ref 0–0.2)
BASOPHILS NFR BLD: 0.2 % (ref 0–1.9)
BILIRUB SERPL-MCNC: 0.5 MG/DL (ref 0.1–1)
BUN SERPL-MCNC: 22 MG/DL (ref 10–30)
CALCIUM SERPL-MCNC: 9.4 MG/DL (ref 8.7–10.5)
CHLORIDE SERPL-SCNC: 100 MMOL/L (ref 95–110)
CHOLEST SERPL-MCNC: 94 MG/DL (ref 120–199)
CHOLEST/HDLC SERPL: 2.1 {RATIO} (ref 2–5)
CO2 SERPL-SCNC: 26 MMOL/L (ref 23–29)
CREAT SERPL-MCNC: 1.2 MG/DL (ref 0.5–1.4)
DIFFERENTIAL METHOD: ABNORMAL
EOSINOPHIL # BLD AUTO: 0 K/UL (ref 0–0.5)
EOSINOPHIL NFR BLD: 0.6 % (ref 0–8)
ERYTHROCYTE [DISTWIDTH] IN BLOOD BY AUTOMATED COUNT: 13.8 % (ref 11.5–14.5)
EST. GFR  (NO RACE VARIABLE): 42 ML/MIN/1.73 M^2
ESTIMATED AVG GLUCOSE: 166 MG/DL (ref 68–131)
GLUCOSE SERPL-MCNC: 157 MG/DL (ref 70–110)
HBA1C MFR BLD: 7.4 % (ref 4–5.6)
HCT VFR BLD AUTO: 31.7 % (ref 37–48.5)
HDLC SERPL-MCNC: 44 MG/DL (ref 40–75)
HDLC SERPL: 46.8 % (ref 20–50)
HGB BLD-MCNC: 10.7 G/DL (ref 12–16)
IMM GRANULOCYTES # BLD AUTO: 0.01 K/UL (ref 0–0.04)
IMM GRANULOCYTES NFR BLD AUTO: 0.2 % (ref 0–0.5)
LDLC SERPL CALC-MCNC: 33.4 MG/DL (ref 63–159)
LYMPHOCYTES # BLD AUTO: 1.1 K/UL (ref 1–4.8)
LYMPHOCYTES NFR BLD: 16.2 % (ref 18–48)
MCH RBC QN AUTO: 28.5 PG (ref 27–31)
MCHC RBC AUTO-ENTMCNC: 33.8 G/DL (ref 32–36)
MCV RBC AUTO: 84 FL (ref 82–98)
MONOCYTES # BLD AUTO: 0.6 K/UL (ref 0.3–1)
MONOCYTES NFR BLD: 9.6 % (ref 4–15)
NEUTROPHILS # BLD AUTO: 4.8 K/UL (ref 1.8–7.7)
NEUTROPHILS NFR BLD: 73.2 % (ref 38–73)
NONHDLC SERPL-MCNC: 50 MG/DL
NRBC BLD-RTO: 0 /100 WBC
PLATELET # BLD AUTO: 193 K/UL (ref 150–450)
PMV BLD AUTO: 10.7 FL (ref 9.2–12.9)
POTASSIUM SERPL-SCNC: 4.7 MMOL/L (ref 3.5–5.1)
PROT SERPL-MCNC: 6.9 G/DL (ref 6–8.4)
RBC # BLD AUTO: 3.76 M/UL (ref 4–5.4)
SODIUM SERPL-SCNC: 135 MMOL/L (ref 136–145)
TRIGL SERPL-MCNC: 83 MG/DL (ref 30–150)
TSH SERPL DL<=0.005 MIU/L-ACNC: 0.58 UIU/ML (ref 0.4–4)
WBC # BLD AUTO: 6.54 K/UL (ref 3.9–12.7)

## 2023-02-15 PROCEDURE — 36415 COLL VENOUS BLD VENIPUNCTURE: CPT | Mod: HCNC | Performed by: INTERNAL MEDICINE

## 2023-02-15 PROCEDURE — 84443 ASSAY THYROID STIM HORMONE: CPT | Mod: HCNC | Performed by: INTERNAL MEDICINE

## 2023-02-15 PROCEDURE — 83036 HEMOGLOBIN GLYCOSYLATED A1C: CPT | Mod: HCNC | Performed by: INTERNAL MEDICINE

## 2023-02-15 PROCEDURE — 80053 COMPREHEN METABOLIC PANEL: CPT | Mod: HCNC | Performed by: INTERNAL MEDICINE

## 2023-02-15 PROCEDURE — 80061 LIPID PANEL: CPT | Mod: HCNC | Performed by: INTERNAL MEDICINE

## 2023-02-15 PROCEDURE — 85025 COMPLETE CBC W/AUTO DIFF WBC: CPT | Mod: HCNC | Performed by: INTERNAL MEDICINE

## 2023-05-16 ENCOUNTER — PES CALL (OUTPATIENT)
Dept: ADMINISTRATIVE | Facility: CLINIC | Age: 88
End: 2023-05-16
Payer: MEDICARE

## 2023-07-14 ENCOUNTER — OFFICE VISIT (OUTPATIENT)
Dept: HOME HEALTH SERVICES | Facility: CLINIC | Age: 88
End: 2023-07-14
Payer: MEDICARE

## 2023-07-14 VITALS
DIASTOLIC BLOOD PRESSURE: 62 MMHG | HEIGHT: 64 IN | WEIGHT: 135 LBS | HEART RATE: 60 BPM | SYSTOLIC BLOOD PRESSURE: 131 MMHG | BODY MASS INDEX: 23.05 KG/M2

## 2023-07-14 DIAGNOSIS — I25.118 ATHEROSCLEROTIC HEART DISEASE OF NATIVE CORONARY ARTERY WITH OTHER FORMS OF ANGINA PECTORIS: ICD-10-CM

## 2023-07-14 DIAGNOSIS — N18.32 TYPE 2 DIABETES MELLITUS WITH STAGE 3B CHRONIC KIDNEY DISEASE, WITHOUT LONG-TERM CURRENT USE OF INSULIN: ICD-10-CM

## 2023-07-14 DIAGNOSIS — E78.5 HYPERLIPIDEMIA ASSOCIATED WITH TYPE 2 DIABETES MELLITUS: ICD-10-CM

## 2023-07-14 DIAGNOSIS — E11.69 HYPERLIPIDEMIA ASSOCIATED WITH TYPE 2 DIABETES MELLITUS: ICD-10-CM

## 2023-07-14 DIAGNOSIS — E11.22 TYPE 2 DIABETES MELLITUS WITH STAGE 3B CHRONIC KIDNEY DISEASE, WITHOUT LONG-TERM CURRENT USE OF INSULIN: ICD-10-CM

## 2023-07-14 DIAGNOSIS — Z00.00 ENCOUNTER FOR PREVENTIVE HEALTH EXAMINATION: Primary | ICD-10-CM

## 2023-07-14 DIAGNOSIS — N18.32 STAGE 3B CHRONIC KIDNEY DISEASE: ICD-10-CM

## 2023-07-14 DIAGNOSIS — I25.2 OLD MI (MYOCARDIAL INFARCTION): ICD-10-CM

## 2023-07-14 DIAGNOSIS — Z74.09 OTHER REDUCED MOBILITY: ICD-10-CM

## 2023-07-14 PROCEDURE — 1160F PR REVIEW ALL MEDS BY PRESCRIBER/CLIN PHARMACIST DOCUMENTED: ICD-10-PCS | Mod: CPTII,S$GLB,, | Performed by: NURSE PRACTITIONER

## 2023-07-14 PROCEDURE — 1125F AMNT PAIN NOTED PAIN PRSNT: CPT | Mod: CPTII,S$GLB,, | Performed by: NURSE PRACTITIONER

## 2023-07-14 PROCEDURE — 1159F PR MEDICATION LIST DOCUMENTED IN MEDICAL RECORD: ICD-10-PCS | Mod: CPTII,S$GLB,, | Performed by: NURSE PRACTITIONER

## 2023-07-14 PROCEDURE — 3288F PR FALLS RISK ASSESSMENT DOCUMENTED: ICD-10-PCS | Mod: CPTII,S$GLB,, | Performed by: NURSE PRACTITIONER

## 2023-07-14 PROCEDURE — G0439 PPPS, SUBSEQ VISIT: HCPCS | Mod: S$GLB,,, | Performed by: NURSE PRACTITIONER

## 2023-07-14 PROCEDURE — 1125F PR PAIN SEVERITY QUANTIFIED, PAIN PRESENT: ICD-10-PCS | Mod: CPTII,S$GLB,, | Performed by: NURSE PRACTITIONER

## 2023-07-14 PROCEDURE — 1101F PR PT FALLS ASSESS DOC 0-1 FALLS W/OUT INJ PAST YR: ICD-10-PCS | Mod: CPTII,S$GLB,, | Performed by: NURSE PRACTITIONER

## 2023-07-14 PROCEDURE — 1159F MED LIST DOCD IN RCRD: CPT | Mod: CPTII,S$GLB,, | Performed by: NURSE PRACTITIONER

## 2023-07-14 PROCEDURE — 1160F RVW MEDS BY RX/DR IN RCRD: CPT | Mod: CPTII,S$GLB,, | Performed by: NURSE PRACTITIONER

## 2023-07-14 PROCEDURE — G0439 PR MEDICARE ANNUAL WELLNESS SUBSEQUENT VISIT: ICD-10-PCS | Mod: S$GLB,,, | Performed by: NURSE PRACTITIONER

## 2023-07-14 PROCEDURE — 3288F FALL RISK ASSESSMENT DOCD: CPT | Mod: CPTII,S$GLB,, | Performed by: NURSE PRACTITIONER

## 2023-07-14 PROCEDURE — 1101F PT FALLS ASSESS-DOCD LE1/YR: CPT | Mod: CPTII,S$GLB,, | Performed by: NURSE PRACTITIONER

## 2023-07-14 NOTE — PROGRESS NOTES
"  Tonya Marquez presented for a  Medicare AWV and comprehensive Health Risk Assessment today. The following components were reviewed and updated:    Medical history  Family History  Social history  Allergies and Current Medications  Health Risk Assessment  Health Maintenance  Care Team         ** See Completed Assessments for Annual Wellness Visit within the encounter summary.**         The following assessments were completed:  Living Situation  CAGE  Depression Screening  Timed Get Up and Go  Whisper Test  Cognitive Function Screening      Nutrition Screening  ADL Screening  PAQ Screening        Vitals:    07/14/23 0957   BP: 131/62   Pulse: 60   Weight: 61.2 kg (135 lb)   Height: 5' 4" (1.626 m)     Body mass index is 23.17 kg/m².  Physical Exam  Constitutional:       Appearance: Normal appearance.   HENT:      Head: Normocephalic and atraumatic.      Nose: Nose normal.      Mouth/Throat:      Mouth: Mucous membranes are moist.   Eyes:      Extraocular Movements: Extraocular movements intact.   Cardiovascular:      Rate and Rhythm: Normal rate and regular rhythm.      Heart sounds: Normal heart sounds.   Pulmonary:      Effort: Pulmonary effort is normal. No respiratory distress.      Breath sounds: Normal breath sounds.   Abdominal:      General: Bowel sounds are normal. There is no distension.      Palpations: Abdomen is soft.   Musculoskeletal:         General: No swelling. Normal range of motion.      Cervical back: Normal range of motion.   Skin:     General: Skin is warm.   Neurological:      General: No focal deficit present.      Mental Status: She is alert and oriented to person, place, and time.   Psychiatric:         Mood and Affect: Mood normal.         Behavior: Behavior normal.             Diagnoses and health risks identified today and associated recommendations/orders:    1. Encounter for preventive health examination  Assessments completed. Preventive measures and health maintenance reviewed " with patient.  Review for opioid screening: Patient does not have any prescriptions for opioids.  Review for substance use disorder: Patient does not use any substances.    2. Type 2 diabetes mellitus with stage 3b chronic kidney disease, without long-term current use of insulin  Stable, patient on Januvia. Followed by PCP and Endocrinology.    3. Hyperlipidemia associated with type 2 diabetes mellitus  Stable, patient on Lipitor. Followed by PCP.    4. Atherosclerotic heart disease of native coronary artery with other forms of angina pectoris  Stable, patient on Aspirin and has Nitro prn. Followed by PCP and Cardiology.    5. Stage 3b chronic kidney disease  Stable, followed by PCP.    6. Old MI (myocardial infarction)  Stable, patient on Plavix. Followed by PCP and Cardiology.    7. Other reduced mobility  Stable, patient has cane and uses as needed. Followed by PCP.      Provided Tonya with a 5-10 year written screening schedule and personal prevention plan. Recommendations were developed using the USPSTF age appropriate recommendations. Education, counseling, and referrals were provided as needed. After Visit Summary printed and given to patient which includes a list of additional screenings\tests needed.    Follow up in about 1 year (around 7/14/2024) for your next annual wellness visit.    Emeli Espinoza, MARIO  I offered to discuss advanced care planning, including how to pick a person who would make decisions for you if you were unable to make them for yourself, called a health care power of , and what kind of decisions you might make such as use of life sustaining treatments such as ventilators and tube feeding when faced with a life limiting illness recorded on a living will that they will need to know. (How you want to be cared for as you near the end of your natural life)     X Patient is interested in learning more about how to make advanced directives.  I provided them paperwork and offered to  discuss this with them.

## 2023-07-14 NOTE — PATIENT INSTRUCTIONS
Counseling and Referral of Other Preventative  (Italic type indicates deductible and co-insurance are waived)    Patient Name: Tonya Marquez  Today's Date: 7/14/2023    Health Maintenance       Date Due Completion Date    TETANUS VACCINE 09/07/2015 9/7/2005    Shingles Vaccine (2 of 3) 09/21/2015 7/27/2015    COVID-19 Vaccine (3 - Pfizer series) 05/05/2021 3/10/2021    Hemoglobin A1c 08/15/2023 2/15/2023    Influenza Vaccine (1) 09/01/2023 10/26/2022    Aspirin/Antiplatelet Therapy 02/10/2024 2/10/2023        No orders of the defined types were placed in this encounter.    The following information is provided to all patients.  This information is to help you find resources for any of the problems found today that may be affecting your health:                Living healthy guide: www.[facility].[place].gov      Understanding Diabetes: www.diabetes.org      Eating healthy: www.cdc.gov/healthyweight      [facility] home safety checklist: www.cdc.gov/steadi/patient.html      Agency on Aging: www.goea.[place].[facility]      Alcoholics anonymous (AA): www.aa.org      Physical Activity: www.lori.nih.gov/gh2hkbl      Tobacco use: www.quitwithusla.org

## 2023-09-28 ENCOUNTER — TELEPHONE (OUTPATIENT)
Dept: ENDOCRINOLOGY | Facility: CLINIC | Age: 88
End: 2023-09-28
Payer: MEDICARE

## 2023-09-28 NOTE — TELEPHONE ENCOUNTER
----- Message from Celine Julio sent at 9/28/2023 11:00 AM CDT -----  .Type: Patient Call Back    Who called:self     What is the request in detail: requesting a callback in regards to diabetes and making a appointment     Can the clinic reply by [facility]? call    Would the patient rather a call back or a response via [facility]? call    Best call back number: .[phone]     Additional Information:

## 2023-09-29 ENCOUNTER — TELEPHONE (OUTPATIENT)
Dept: PHARMACY | Facility: CLINIC | Age: 88
End: 2023-09-29
Payer: MEDICARE

## 2023-09-29 ENCOUNTER — OFFICE VISIT (OUTPATIENT)
Dept: ENDOCRINOLOGY | Facility: CLINIC | Age: 88
End: 2023-09-29
Payer: MEDICARE

## 2023-09-29 VITALS
HEART RATE: 63 BPM | TEMPERATURE: 99 F | WEIGHT: 141 LBS | DIASTOLIC BLOOD PRESSURE: 56 MMHG | SYSTOLIC BLOOD PRESSURE: 136 MMHG | BODY MASS INDEX: 24.2 KG/M2

## 2023-09-29 DIAGNOSIS — I10 HYPERTENSION, UNSPECIFIED TYPE: ICD-10-CM

## 2023-09-29 DIAGNOSIS — N18.31 TYPE 2 DIABETES MELLITUS WITH STAGE 3A CHRONIC KIDNEY DISEASE, WITHOUT LONG-TERM CURRENT USE OF INSULIN: Primary | ICD-10-CM

## 2023-09-29 DIAGNOSIS — E11.22 TYPE 2 DIABETES MELLITUS WITH STAGE 3A CHRONIC KIDNEY DISEASE, WITHOUT LONG-TERM CURRENT USE OF INSULIN: Primary | ICD-10-CM

## 2023-09-29 LAB — GLUCOSE SERPL-MCNC: 211 MG/DL (ref 70–110)

## 2023-09-29 PROCEDURE — 82962 GLUCOSE BLOOD TEST: CPT | Mod: HCNC,S$GLB,, | Performed by: NURSE PRACTITIONER

## 2023-09-29 PROCEDURE — 99999 PR PBB SHADOW E&M-EST. PATIENT-LVL IV: ICD-10-PCS | Mod: PBBFAC,HCNC,, | Performed by: NURSE PRACTITIONER

## 2023-09-29 PROCEDURE — 1159F MED LIST DOCD IN RCRD: CPT | Mod: HCNC,CPTII,S$GLB, | Performed by: NURSE PRACTITIONER

## 2023-09-29 PROCEDURE — 1160F RVW MEDS BY RX/DR IN RCRD: CPT | Mod: HCNC,CPTII,S$GLB, | Performed by: NURSE PRACTITIONER

## 2023-09-29 PROCEDURE — 99214 PR OFFICE/OUTPT VISIT, EST, LEVL IV, 30-39 MIN: ICD-10-PCS | Mod: HCNC,S$GLB,, | Performed by: NURSE PRACTITIONER

## 2023-09-29 PROCEDURE — 82962 POCT GLUCOSE, HAND-HELD DEVICE: ICD-10-PCS | Mod: HCNC,S$GLB,, | Performed by: NURSE PRACTITIONER

## 2023-09-29 PROCEDURE — 1160F PR REVIEW ALL MEDS BY PRESCRIBER/CLIN PHARMACIST DOCUMENTED: ICD-10-PCS | Mod: HCNC,CPTII,S$GLB, | Performed by: NURSE PRACTITIONER

## 2023-09-29 PROCEDURE — 99999 PR PBB SHADOW E&M-EST. PATIENT-LVL IV: CPT | Mod: PBBFAC,HCNC,, | Performed by: NURSE PRACTITIONER

## 2023-09-29 PROCEDURE — 99214 OFFICE O/P EST MOD 30 MIN: CPT | Mod: HCNC,S$GLB,, | Performed by: NURSE PRACTITIONER

## 2023-09-29 PROCEDURE — 1159F PR MEDICATION LIST DOCUMENTED IN MEDICAL RECORD: ICD-10-PCS | Mod: HCNC,CPTII,S$GLB, | Performed by: NURSE PRACTITIONER

## 2023-09-29 RX ORDER — LINAGLIPTIN 5 MG/1
5 TABLET, FILM COATED ORAL DAILY
Qty: 30 TABLET | Refills: 5 | Status: SHIPPED | OUTPATIENT
Start: 2023-09-29 | End: 2024-02-15

## 2023-09-29 NOTE — TELEPHONE ENCOUNTER
I have reached out to Tonya Marquez to inform her of the [employer] [facility] application process for Tradjenta and whats required to apply.  Tonya Marquez did not answer. I left a voicemail and mailed a letter introducing her to the pharmacy patient assistance program. I will follow up in 5 business days.

## 2023-09-29 NOTE — PROGRESS NOTES
CC: This 89 y.o. White female  is here for evaluation of  T2DM along with comorbidities indicated in the Visit Diagnosis section of this encounter.    HPI: Tonya Marquez was diagnosed with T2DM in her 70s.   Dr. Byers - cardiologist     DM COMPLICATIONS: nephropathy and cardiovascular disease  Medical hx: MI, CKD stage 3       Initial visit 7/2022  Last seen by Dr. Gomez in 11/2020 for abnormal thyroid lab work. His workup was ultimately reassuring.   She returns today to Endocrine for diabetes and to get personal CGM.   She is stressed out because of financial and legal issues with her bank. She would like to return to [place] to settle matters this year.   Plan Pt declines Randal d/t cost.   Discussed risks of current medications - lactic acidosis with metformin and hypoglycemia with glipizide, especially in light of advanced age and kidney disease.   Stop metformin and glipizide.   Start Januvia at renal dose of 50 mg once daily. A1c today.   Test glucose 1x/day - fasting or predinner/bedtime. A1c again 3 months. Return to clinic in 3 months after a1c.       Interval hx  Pt last seen by me July 2022. A1c is up from 6.4% at lov to 7.2% in August last month. She has been taking Januvia. Now cannot afford Januvia - price has gone up to $430 for 3 month supply.         LAST DIABETES EDUCATION: 6/2020    SIGNIFICANT DIABETES MED HISTORY: denies     PRESCRIBED DIABETES MEDICATIONS:   Januvia 50 mg once daily     Misses medication doses - No Januvia x 2 weeks         SELF MONITORING BLOOD GLUCOSE: Checks blood glucose at home with true metrix meter, about 1x/week. BGs 140-160s     POCT glucose 211, 4 hours after breakfast     HYPOGLYCEMIC EPISODES: rare      CURRENT DIET: eats 3 meals/day.  Drinks water, tea with AS    Breakfast was 1 slice of toast and oatmeal with AS.       CURRENT EXERCISE: goes to class at [employer] 2x/week       BP (!) 136/56   Pulse 63   Temp 98.9 °F (37.2 °C)   Wt 64 kg (141 lb)   " BMI 24.20 kg/m²     ROS:   CONSTITUTIONAL: Appetite good, denies fatigue  GI: No nausea, vomiting, or diarrhea  OTHER: n/a      PHYSICAL EXAM:  GENERAL: Well developed, well nourished. No acute distress.   PSYCH: AAOx3, appropriate mood and affect, conversant, well-groomed. Judgement and insight good.   NEURO: Cranial nerves grossly intact. Speech clear, no tremor.   CHEST: Respirations even and unlabored.        Hemoglobin A1C   Date Value Ref Range Status   08/04/2023 7.2 (H) <5.7 % of total Hgb Final     Comment:     For someone without known diabetes, a hemoglobin A1c  value of 6.5% or greater indicates that they may have   diabetes and this should be confirmed with a follow-up   test.     For someone with known diabetes, a value <7% indicates   that their diabetes is well controlled and a value   greater than or equal to 7% indicates suboptimal   control. A1c targets should be individualized based on   duration of diabetes, age, comorbid conditions, and   other considerations.     Currently, no consensus exists regarding use of  hemoglobin A1c for diagnosis of diabetes for children.         02/15/2023 7.4 (H) 4.0 - 5.6 % Final     Comment:     ADA Screening Guidelines:  5.7-6.4%  Consistent with prediabetes  >or=6.5%  Consistent with diabetes    High levels of fetal hemoglobin interfere with the HbA1C  assay. Heterozygous hemoglobin variants (HbS, HgC, etc)do  not significantly interfere with this assay.   However, presence of multiple variants may affect accuracy.     07/20/2022 6.4 (H) 4.0 - 5.6 % Final     Comment:     ADA Screening Guidelines:  5.7-6.4%  Consistent with prediabetes  >or=6.5%  Consistent with diabetes    High levels of fetal hemoglobin interfere with the HbA1C  assay. Heterozygous hemoglobin variants (HbS, HgC, etc)do  not significantly interfere with this assay.   However, presence of multiple variants may affect accuracy.         No results found for: "CPEPTIDE", "GLUTAMICACID", " ""ISLETCELLANT", "FRUCTOSAMINE"     Lab Results   Component Value Date    CHOL 94 (L) 02/15/2023    CHOL 111 (L) 04/07/2021    CHOL 125 06/05/2020     Lab Results   Component Value Date    HDL 44 02/15/2023    HDL 48 04/07/2021    HDL 46 06/05/2020     Lab Results   Component Value Date    LDLCALC 33.4 (L) 02/15/2023    LDLCALC 28.0 (L) 04/07/2021    LDLCALC 47.4 (L) 06/05/2020     Lab Results   Component Value Date    TRIG 83 02/15/2023    TRIG 175 (H) 04/07/2021    TRIG 158 (H) 06/05/2020     Lab Results   Component Value Date    CHOLHDL 46.8 02/15/2023    CHOLHDL 43.2 04/07/2021    CHOLHDL 36.8 06/05/2020         Component Value Date/Time     08/04/2023 1410    K 5.1 08/04/2023 1410     08/04/2023 1410    CO2 23 08/04/2023 1410    BUN 33 (H) 08/04/2023 1410    CREATININE 1.35 (H) 08/04/2023 1410     08/04/2023 1410    CALCIUM 10.0 08/04/2023 1410    ALKPHOS 88 02/15/2023 0952    AST 15 08/04/2023 1410    ALT 16 08/04/2023 1410    BILITOT 0.6 08/04/2023 1410    EGFRNORACEVR 36 (L) 08/04/2023 1410    ESTGFRAFRICA 45 (A) 03/07/2022 1330              Lab Results   Component Value Date    LABMICR 23.0 02/10/2023    CREATRANDUR 22.0 02/10/2023    MICALBCREAT 104.5 (H) 02/10/2023             ASSESSMENT and PLAN:    A1C GOAL: < 8 %     1. Type 2 diabetes mellitus with stage 3a chronic kidney disease, without long-term current use of insulin  Medication options for diabetes are limited d/t low kidney function and cost of medications.     Advised pt:   Switch from Januvia to Tradjenta, which is an equivalent medication but with easier financial aid application process.     A referral has been placed for Pharmacy Assistance to help apply for free TRADJENTA.     In the meantime, you can choose to  a 30 day supply of Tradjenta from [employer] if affordable.     Test blood sugar a few times a week before or 2 hours after meals.   Premeal blood sugars should be no more than mid 100s. Blood sugars after " meals should not be more than mid 200s.     Return to clinic in 3 months with labs prior.                  Ambulatory referral/consult to Pharmacy Assistance    POCT Glucose, Hand-Held Device    Hemoglobin A1C      2. Hypertension, unspecified type  Controlled.         Orders Placed This Encounter   Procedures    Hemoglobin A1C     Standing Status:   Future     Standing Expiration Date:   11/27/2024    Ambulatory referral/consult to Pharmacy Assistance     Standing Status:   Future     Standing Expiration Date:   10/29/2024     Referral Priority:   Routine     Referral Type:   Consultation     Referral Reason:   Specialty Services Required     Number of Visits Requested:   1    POCT Glucose, Hand-Held Device        Follow up in about 3 months (around 12/29/2023).

## 2023-09-29 NOTE — LETTER
November 2, 2023    Tonya Marquez  [address]  [place] [address]             [facility] El[facility] - Pharmacy Assistance  [address]  [facility] [address]  Fax: [phone] Dear Tonya Marquez,    You have been denied enrollment in [facility] Patient Assistance Program       Denial Date: 11/02/2023  Denial Reason:   Does not meet Program Coverage Guidelines             If you have any questions or concerns please give me a call at [phone]    Thank you for choosing [facility]    Sincerly  Dalila Suh   Phone# [phone]  Fax# [phone]  Email: [email or web address]

## 2023-09-29 NOTE — LETTER
September 29, 2023    Tonya Marquez  [address]  [place] [address]             [facility] Robins - Pharmacy Assistance  [address]  [facility] [address]  Fax: [phone]   Dear Ms. Marquez,    My Name is Dalila Suh. I am a Pharmacy Technician reaching out on behalf of [facility]s Pharmacy Patient Assistance Team after receiving a referral from your provider inquiring about assistance with your medications. I tried to call you on 9/29/2023 but was unable to reach you. Our goal is to assist qualified patients with financial assistance for their medications to better help you achieve your health goals!    Please note that enrollment and eligibility into available support may require the following documents:    Proof of household Income( such as social security statement, 1099 form, pension statement or 3 consecutive pay stubs  Copy of all insurance cards (front and back)  Print out from your insurance or pharmacy showing how much you have spent on prescriptions this year  Signed and dated HIPAA /Patient Information Forms   (These forms will be sent to you once you contact us)     Please reach out to my phone number below if you are still in need of assistance with your medications. We will attempt to reach out to you through [employer] or via phone call again in 5 business days. We look forward to hearing from you soon!    [facility] you [facility]    Sincerely  Dalila Suh  Phone# [phone]  Fax# [phone]  Email: [email or web address]

## 2023-09-29 NOTE — PATIENT INSTRUCTIONS
Switch from Januvia to Tradjenta, which is an equivalent medication but with easier financial aid application process.     A referral has been placed for Pharmacy Assistance to help apply for free TRADJENTA.   You will need to provide the following documents:   - Proof of household Income( such as social security statement, 1099 form, pension statement or 3 consecutive pay stubs  - Copy of all Insurance cards( front and back)  - Signed & dated OCCP/ Patient Authorization Forms (provided during visit)    Someone will be contacting you from Pharmacy Assistance.     In the meantime, you can choose to  a 30 day supply of Tradjenta from [employer] if affordable.     Test blood sugar a few times a week before or 2 hours after meals.   Premeal blood sugars should be no more than mid 100s. Blood sugars after meals should not be more than mid 200s.     Return to clinic in 3 months with labs prior.

## 2023-10-30 NOTE — TELEPHONE ENCOUNTER
Hello,       A Patient Assistance Application for Tonya Marquez - MRN  [id number] was faxed to your office @[phone]. Please have Krista Osuna review the application to ensure the prescription is correct. If correct, sign and fax the application back to the Pharmacy Patient Assistance Team @[phone].      If changes need to be made to this application, please let me know so corrections can be made, and the application will be faxed back to you for approval and signature.

## 2023-11-02 NOTE — TELEPHONE ENCOUNTER
Tonya Jimmy has been denied by [facility] Patient Assistance Program.    Denial Date: 11/02/2023  Denial Reason:   Does Not Meet Program Coverage Guidelines        Patient has been notified of this decision via LETTER

## 2024-01-08 ENCOUNTER — TELEPHONE (OUTPATIENT)
Dept: ENDOCRINOLOGY | Facility: CLINIC | Age: 89
End: 2024-01-08
Payer: MEDICARE

## 2024-01-08 NOTE — TELEPHONE ENCOUNTER
----- Message from Jace Richards sent at 1/8/2024 11:02 AM CST -----  Regarding: self  Type: Patient Call Back    Who called:self    What is the request in detail:need to get appointment before march, wants to be seen sooner, stated     Can the clinic reply by [facility]?no    Would the patient rather a call back or a response via [facility]? Callback     Best call back number:[phone]    Additional Information:pt is deft just stating what she told me

## 2024-01-09 ENCOUNTER — LAB VISIT (OUTPATIENT)
Dept: LAB | Facility: HOSPITAL | Age: 89
End: 2024-01-09
Payer: MEDICARE

## 2024-01-09 DIAGNOSIS — E11.22 TYPE 2 DIABETES MELLITUS WITH STAGE 3A CHRONIC KIDNEY DISEASE, WITHOUT LONG-TERM CURRENT USE OF INSULIN: ICD-10-CM

## 2024-01-09 DIAGNOSIS — N18.31 TYPE 2 DIABETES MELLITUS WITH STAGE 3A CHRONIC KIDNEY DISEASE, WITHOUT LONG-TERM CURRENT USE OF INSULIN: ICD-10-CM

## 2024-01-09 LAB
ESTIMATED AVG GLUCOSE: 163 MG/DL (ref 68–131)
HBA1C MFR BLD: 7.3 % (ref 4–5.6)

## 2024-01-09 PROCEDURE — 83036 HEMOGLOBIN GLYCOSYLATED A1C: CPT | Mod: HCNC | Performed by: NURSE PRACTITIONER

## 2024-01-09 PROCEDURE — 36415 COLL VENOUS BLD VENIPUNCTURE: CPT | Mod: HCNC,PO | Performed by: NURSE PRACTITIONER

## 2024-02-12 ENCOUNTER — TELEPHONE (OUTPATIENT)
Dept: OTOLARYNGOLOGY | Facility: CLINIC | Age: 89
End: 2024-02-12
Payer: MEDICARE

## 2024-02-15 RX ORDER — LINAGLIPTIN 5 MG/1
5 TABLET, FILM COATED ORAL
Qty: 30 TABLET | Refills: 5 | Status: SHIPPED | OUTPATIENT
Start: 2024-02-15 | End: 2024-02-22 | Stop reason: SDUPTHER

## 2024-02-19 ENCOUNTER — TELEPHONE (OUTPATIENT)
Dept: FAMILY MEDICINE | Facility: CLINIC | Age: 89
End: 2024-02-19
Payer: MEDICARE

## 2024-02-19 NOTE — TELEPHONE ENCOUNTER
Patient informed she is scheduled for an appointment with Dr. Pena tomorrow, 2/20/2024 at 2:00 pm at [facility].       She has an upcoming appointment with Krista Osuna NP on 7/10/2024 at 2:30 pm. Patient reports she was told she would have to see MD Patricia. Will forward message to Endocrinology for further assistance.

## 2024-02-19 NOTE — TELEPHONE ENCOUNTER
----- Message from Tanya Randhawa sent at 2/19/2024 12:44 PM CST -----  Regarding: patient call back  Type: Patient Call Back    Who called: Self     What is the request in detail: asked for a call back from the nurse     Can the clinic reply by [facility]?    Would the patient rather a call back or a response via [facility]? Call     Best call back number: .[phone]

## 2024-02-20 PROBLEM — G25.2 INTENTION TREMOR: Status: ACTIVE | Noted: 2024-02-20

## 2024-02-22 RX ORDER — LINAGLIPTIN 5 MG/1
5 TABLET, FILM COATED ORAL DAILY
Qty: 30 TABLET | Refills: 5 | Status: SHIPPED | OUTPATIENT
Start: 2024-02-22

## 2024-02-22 NOTE — TELEPHONE ENCOUNTER
----- Message from Marcinabbi Loyd sent at 2/22/2024 12:57 PM CST -----  Regarding: Tonya  Type:Patient Callback     Who called: Tonya     What is the request in detail: Pt stated she only has one pill left and she sent a message earlier about her diabetic medication and she has not heard from the office. Please reach out to her     Can the clinic reply by [facility]? No     Would the patient rather a call back or a response via [facility]? Callback     Best call back number: [phone]      Additional Information:Needs it refilled today

## 2024-02-22 NOTE — TELEPHONE ENCOUNTER
----- Message from Yulisa Morrell sent at 2/22/2024  9:27 AM CST -----  Type: RX Refill Request    Who Called: pt requesting a call from the nurse. Call pt     Have you contacted your pharmacy:    Refill or New Rx:TRADJENTA 5 mg Tab tablet      RX Name and Strength:    How is the patient currently taking it? (ex. 1XDay):    Is this a 30 day or 90 day RX:    Preferred Pharmacy with phone number:    [id number] - [place] [address] [place] [address]  Phone: [phone] Fax: [phone]        Local or Mail Order:    Ordering Provider:    Would the patient rather a call back or a response via [facility]? call    Best Call Back Number:[phone] (home)       Additional Information:

## 2024-02-22 NOTE — TELEPHONE ENCOUNTER
Informed pt that medication was sent to provider and she'll get her medicine today from [facility]. Understanding verbalized.

## 2024-06-28 ENCOUNTER — TELEPHONE (OUTPATIENT)
Dept: FAMILY MEDICINE | Facility: CLINIC | Age: 89
End: 2024-06-28
Payer: MEDICARE

## 2024-06-28 DIAGNOSIS — J32.9 CHRONIC SINUSITIS, UNSPECIFIED LOCATION: Primary | ICD-10-CM

## 2024-06-28 NOTE — TELEPHONE ENCOUNTER
----- Message from Valorie Ybarra sent at 6/28/2024 11:27 AM CDT -----  Type: Patient Call Back    Who called:Self    What is the request in detail:In regards to getting a referral    Can the clinic reply by [facility]?No    Would the patient rather a call back or a response via [facility]? Call    Best call back number:.[phone] (home)       Additional Information:

## 2024-07-01 ENCOUNTER — TELEPHONE (OUTPATIENT)
Dept: FAMILY MEDICINE | Facility: CLINIC | Age: 89
End: 2024-07-01
Payer: MEDICARE

## 2024-07-01 NOTE — TELEPHONE ENCOUNTER
----- Message from Kina Guaman sent at 7/1/2024 12:05 PM CDT -----  Regarding: Self .[phone]  Type: Patient Call Back     What is the request in detail: Please refax pts referral to Dr Larsen office , Pt called the office and stated they didn't have the referral in yet     Can the clinic reply by [facility]? No     Would the patient rather a call back or a response via [facility]? Call back    Best call back number: .[phone]      Additional Information:    Thank you.

## 2024-07-01 NOTE — TELEPHONE ENCOUNTER
Referral re-faxed to:      Robert Blackwell II, MD   [address] [place] [address]   [id number]   [place] [address]   Phone: [phone]   Fax: [phone]

## 2024-07-10 ENCOUNTER — OFFICE VISIT (OUTPATIENT)
Dept: ENDOCRINOLOGY | Facility: CLINIC | Age: 89
End: 2024-07-10
Payer: MEDICARE

## 2024-07-10 ENCOUNTER — LAB VISIT (OUTPATIENT)
Dept: LAB | Facility: HOSPITAL | Age: 89
End: 2024-07-10
Attending: NURSE PRACTITIONER
Payer: MEDICARE

## 2024-07-10 VITALS
TEMPERATURE: 99 F | DIASTOLIC BLOOD PRESSURE: 56 MMHG | SYSTOLIC BLOOD PRESSURE: 144 MMHG | BODY MASS INDEX: 24.2 KG/M2 | HEART RATE: 62 BPM | WEIGHT: 141 LBS

## 2024-07-10 DIAGNOSIS — N18.31 TYPE 2 DIABETES MELLITUS WITH STAGE 3A CHRONIC KIDNEY DISEASE, WITHOUT LONG-TERM CURRENT USE OF INSULIN: ICD-10-CM

## 2024-07-10 DIAGNOSIS — N18.31 TYPE 2 DIABETES MELLITUS WITH STAGE 3A CHRONIC KIDNEY DISEASE, WITHOUT LONG-TERM CURRENT USE OF INSULIN: Primary | ICD-10-CM

## 2024-07-10 DIAGNOSIS — E11.22 TYPE 2 DIABETES MELLITUS WITH STAGE 3A CHRONIC KIDNEY DISEASE, WITHOUT LONG-TERM CURRENT USE OF INSULIN: ICD-10-CM

## 2024-07-10 DIAGNOSIS — E11.22 TYPE 2 DIABETES MELLITUS WITH STAGE 3A CHRONIC KIDNEY DISEASE, WITHOUT LONG-TERM CURRENT USE OF INSULIN: Primary | ICD-10-CM

## 2024-07-10 PROCEDURE — 36415 COLL VENOUS BLD VENIPUNCTURE: CPT | Mod: HCNC | Performed by: NURSE PRACTITIONER

## 2024-07-10 PROCEDURE — 1160F RVW MEDS BY RX/DR IN RCRD: CPT | Mod: HCNC,CPTII,S$GLB, | Performed by: NURSE PRACTITIONER

## 2024-07-10 PROCEDURE — 83036 HEMOGLOBIN GLYCOSYLATED A1C: CPT | Mod: HCNC | Performed by: NURSE PRACTITIONER

## 2024-07-10 PROCEDURE — 1159F MED LIST DOCD IN RCRD: CPT | Mod: HCNC,CPTII,S$GLB, | Performed by: NURSE PRACTITIONER

## 2024-07-10 PROCEDURE — 99214 OFFICE O/P EST MOD 30 MIN: CPT | Mod: HCNC,S$GLB,, | Performed by: NURSE PRACTITIONER

## 2024-07-10 PROCEDURE — 99999 PR PBB SHADOW E&M-EST. PATIENT-LVL III: CPT | Mod: PBBFAC,HCNC,, | Performed by: NURSE PRACTITIONER

## 2024-07-10 RX ORDER — LINAGLIPTIN 5 MG/1
5 TABLET, FILM COATED ORAL DAILY
Qty: 30 TABLET | Refills: 5 | Status: SHIPPED | OUTPATIENT
Start: 2024-07-10

## 2024-07-10 NOTE — PROGRESS NOTES
CC: This 89 y.o. White female  is here for evaluation of  T2DM along with comorbidities indicated in the Visit Diagnosis section of this encounter.    HPI: Tonya Marquez was diagnosed with T2DM in her 70s.   Dr. Byers - cardiologist     DM COMPLICATIONS: nephropathy and cardiovascular disease  Medical hx: MI, CKD stage 3         Prior visit 9/2023  Pt last seen by me July 2022. A1c is up from 6.4% at lov to 7.2% in August last month. She has been taking Januvia. Now cannot afford Januvia - price has gone up to $430 for 3 month supply.   Plan Medication options for diabetes are limited d/t low kidney function and cost of medications.   Advised pt:   Switch from Januvia to Tradjenta, which is an equivalent medication but with easier financial aid application process.   A referral has been placed for Pharmacy Assistance to help apply for free TRADJENTA.   In the meantime, you can choose to  a 30 day supply of Tradjenta from [employer] if affordable.   Test blood sugar a few times a week before or 2 hours after meals.   Premeal blood sugars should be no more than mid 100s. Blood sugars after meals should not be more than mid 200s.   Return to clinic in 3 months with labs prior.     Interval hx  Pt last seen several months ago. Last A1c was 7.3% in Jan.       LAST DIABETES EDUCATION: 6/2020    SIGNIFICANT DIABETES MED HISTORY:   Glipizide and CKD stopped d/t CKD in 2022     PRESCRIBED DIABETES MEDICATIONS: pt does not qualify Tradjenta for financial assistance   Tradjenta 5 mg once daily     Misses medication doses - No       SELF MONITORING BLOOD GLUCOSE: Checks blood glucose at home with true metrix meter, every 3 days. BGs about 2 hours after breakfast range 130-140s    HYPOGLYCEMIC EPISODES: rare      CURRENT DIET: eats 3 meals/day.  Drinks water, tea with AS      Diet recall: breakfast was 2 slices of toast. Lunch was tomato soup. Dinner was chicken and vegetables (broccoli and carrots)       CURRENT  EXERCISE: goes to class at [employer] 2x/week       BP (!) 144/56   Pulse 62   Temp 99.2 °F (37.3 °C)   Wt 64 kg (141 lb)   BMI 24.20 kg/m²     ROS:   CONSTITUTIONAL: Appetite good, denies fatigue  OTHER: denies urinary frequency and polydipsia       PHYSICAL EXAM:  GENERAL: Well developed, well nourished. No acute distress.   PSYCH: AAOx3, appropriate mood and affect, conversant, well-groomed. Judgement and insight good.   NEURO: Cranial nerves grossly intact. Speech clear, no tremor.   CHEST: Respirations even and unlabored.        Hemoglobin A1C   Date Value Ref Range Status   01/09/2024 7.3 (H) 4.0 - 5.6 % Final     Comment:     ADA Screening Guidelines:  5.7-6.4%  Consistent with prediabetes  >or=6.5%  Consistent with diabetes    High levels of fetal hemoglobin interfere with the HbA1C  assay. Heterozygous hemoglobin variants (HbS, HgC, etc)do  not significantly interfere with this assay.   However, presence of multiple variants may affect accuracy.     08/04/2023 7.2 (H) <5.7 % of total Hgb Final     Comment:     For someone without known diabetes, a hemoglobin A1c  value of 6.5% or greater indicates that they may have   diabetes and this should be confirmed with a follow-up   test.     For someone with known diabetes, a value <7% indicates   that their diabetes is well controlled and a value   greater than or equal to 7% indicates suboptimal   control. A1c targets should be individualized based on   duration of diabetes, age, comorbid conditions, and   other considerations.     Currently, no consensus exists regarding use of  hemoglobin A1c for diagnosis of diabetes for children.         02/15/2023 7.4 (H) 4.0 - 5.6 % Final     Comment:     ADA Screening Guidelines:  5.7-6.4%  Consistent with prediabetes  >or=6.5%  Consistent with diabetes    High levels of fetal hemoglobin interfere with the HbA1C  assay. Heterozygous hemoglobin variants (HbS, HgC, etc)do  not significantly interfere with this  "assay.   However, presence of multiple variants may affect accuracy.         No results found for: "CPEPTIDE", "GLUTAMICACID", "ISLETCELLANT", "FRUCTOSAMINE"     Lab Results   Component Value Date    CHOL 94 (L) 02/15/2023    CHOL 111 (L) 04/07/2021    CHOL 125 06/05/2020     Lab Results   Component Value Date    HDL 44 02/15/2023    HDL 48 04/07/2021    HDL 46 06/05/2020     Lab Results   Component Value Date    LDLCALC 33.4 (L) 02/15/2023    LDLCALC 28.0 (L) 04/07/2021    LDLCALC 47.4 (L) 06/05/2020     Lab Results   Component Value Date    TRIG 83 02/15/2023    TRIG 175 (H) 04/07/2021    TRIG 158 (H) 06/05/2020     Lab Results   Component Value Date    CHOLHDL 46.8 02/15/2023    CHOLHDL 43.2 04/07/2021    CHOLHDL 36.8 06/05/2020         Component Value Date/Time     08/04/2023 1410    K 5.1 08/04/2023 1410     08/04/2023 1410    CO2 23 08/04/2023 1410    BUN 33 (H) 08/04/2023 1410    CREATININE 1.35 (H) 08/04/2023 1410     08/04/2023 1410    CALCIUM 10.0 08/04/2023 1410    ALKPHOS 88 02/15/2023 0952    AST 15 08/04/2023 1410    ALT 16 08/04/2023 1410    BILITOT 0.6 08/04/2023 1410    EGFRNORACEVR 36 (L) 08/04/2023 1410    ESTGFRAFRICA 45 (A) 03/07/2022 1330              Lab Results   Component Value Date    LABMICR 23.0 02/10/2023    CREATRANDUR 22.0 02/10/2023    MICALBCREAT 104.5 (H) 02/10/2023             ASSESSMENT and PLAN:    A1C GOAL: < 8 %     1. Type 2 diabetes mellitus with stage 3a chronic kidney disease, without long-term current use of insulin  Reported glucoses are at goal.   A1c today and will transfer pt back to Primary if it remains < 8%.     Hemoglobin A1C             Orders Placed This Encounter   Procedures    Hemoglobin A1C     Standing Status:   Future     Standing Expiration Date:   9/8/2025        No follow-ups on file.           "

## 2024-07-11 ENCOUNTER — TELEPHONE (OUTPATIENT)
Dept: ENDOCRINOLOGY | Facility: CLINIC | Age: 89
End: 2024-07-11
Payer: MEDICARE

## 2024-07-11 LAB
ESTIMATED AVG GLUCOSE: 166 MG/DL (ref 68–131)
HBA1C MFR BLD: 7.4 % (ref 4–5.6)

## 2024-07-11 NOTE — TELEPHONE ENCOUNTER
----- Message from Krista Osuna NP sent at 7/11/2024  2:11 PM CDT -----  A1c continues to indicate good glucose control. Continue Tradjenta. Please f/u with Dr. Pena for chronic diabetes management. Rtc prn.

## 2024-08-13 ENCOUNTER — OFFICE VISIT (OUTPATIENT)
Dept: FAMILY MEDICINE | Facility: CLINIC | Age: 89
End: 2024-08-13
Payer: MEDICARE

## 2024-08-13 VITALS
TEMPERATURE: 97 F | HEIGHT: 64 IN | WEIGHT: 142.44 LBS | BODY MASS INDEX: 24.32 KG/M2 | SYSTOLIC BLOOD PRESSURE: 162 MMHG | DIASTOLIC BLOOD PRESSURE: 50 MMHG | OXYGEN SATURATION: 97 % | HEART RATE: 69 BPM

## 2024-08-13 DIAGNOSIS — E11.22 TYPE 2 DIABETES MELLITUS WITH STAGE 3B CHRONIC KIDNEY DISEASE, WITHOUT LONG-TERM CURRENT USE OF INSULIN: Primary | ICD-10-CM

## 2024-08-13 DIAGNOSIS — I10 HYPERTENSION, ESSENTIAL, BENIGN: ICD-10-CM

## 2024-08-13 DIAGNOSIS — M25.512 CHRONIC LEFT SHOULDER PAIN: ICD-10-CM

## 2024-08-13 DIAGNOSIS — G89.29 CHRONIC LEFT SHOULDER PAIN: ICD-10-CM

## 2024-08-13 DIAGNOSIS — N18.32 TYPE 2 DIABETES MELLITUS WITH STAGE 3B CHRONIC KIDNEY DISEASE, WITHOUT LONG-TERM CURRENT USE OF INSULIN: Primary | ICD-10-CM

## 2024-08-13 PROBLEM — N18.30 CHRONIC KIDNEY DISEASE (CKD), STAGE III (MODERATE): Status: RESOLVED | Noted: 2017-06-12 | Resolved: 2024-08-13

## 2024-08-13 PROCEDURE — 99999 PR PBB SHADOW E&M-EST. PATIENT-LVL IV: CPT | Mod: PBBFAC,HCNC,, | Performed by: INTERNAL MEDICINE

## 2024-08-13 PROCEDURE — 99214 OFFICE O/P EST MOD 30 MIN: CPT | Mod: HCNC,S$GLB,, | Performed by: INTERNAL MEDICINE

## 2024-08-13 PROCEDURE — 1125F AMNT PAIN NOTED PAIN PRSNT: CPT | Mod: HCNC,CPTII,S$GLB, | Performed by: INTERNAL MEDICINE

## 2024-08-13 PROCEDURE — 1159F MED LIST DOCD IN RCRD: CPT | Mod: HCNC,CPTII,S$GLB, | Performed by: INTERNAL MEDICINE

## 2024-08-13 PROCEDURE — 3288F FALL RISK ASSESSMENT DOCD: CPT | Mod: HCNC,CPTII,S$GLB, | Performed by: INTERNAL MEDICINE

## 2024-08-13 PROCEDURE — 1160F RVW MEDS BY RX/DR IN RCRD: CPT | Mod: HCNC,CPTII,S$GLB, | Performed by: INTERNAL MEDICINE

## 2024-08-13 PROCEDURE — G2211 COMPLEX E/M VISIT ADD ON: HCPCS | Mod: HCNC,S$GLB,, | Performed by: INTERNAL MEDICINE

## 2024-08-13 PROCEDURE — 1101F PT FALLS ASSESS-DOCD LE1/YR: CPT | Mod: HCNC,CPTII,S$GLB, | Performed by: INTERNAL MEDICINE

## 2024-08-13 RX ORDER — LEVOCETIRIZINE DIHYDROCHLORIDE 5 MG/1
1 TABLET, FILM COATED ORAL NIGHTLY
COMMUNITY
Start: 2024-08-06 | End: 2025-08-06

## 2024-08-13 RX ORDER — SENNOSIDES 8.6 MG/1
8.6 TABLET ORAL
COMMUNITY

## 2024-08-13 RX ORDER — ENALAPRIL MALEATE 20 MG/1
1 TABLET ORAL 2 TIMES DAILY
COMMUNITY
Start: 2024-05-16

## 2024-08-13 RX ORDER — BUDESONIDE 0.5 MG/2ML
INHALANT ORAL
COMMUNITY

## 2024-08-13 RX ORDER — DAPAGLIFLOZIN 10 MG/1
10 TABLET, FILM COATED ORAL DAILY
Qty: 90 TABLET | Refills: 0 | Status: SHIPPED | OUTPATIENT
Start: 2024-08-13 | End: 2024-08-14

## 2024-08-13 NOTE — PROGRESS NOTES
SUBJECTIVE     Chief Complaint   Patient presents with    Diabetes    Dizziness    Back Pain       HPI  Tonya Marquez is a 89 y.o. female with multiple medical diagnoses as listed in the medical history and problem list that presents for evaluation for DM2. Pt has been doing  okay  since last visit, but has some dizziness and L shoulder pain. she is fully compliant with meds and denies any adverse side effects. Pt is  mostly compliant  with an ADA diet and does exercise twice weekly in the gym. Pt does not check her blood sugar levels at home as she has some issues with tremors. Pt denies any hypoglycemia since last visit. Pt presents for med refills today and is without any other complaints.     PAST MEDICAL HISTORY:  Past Medical History:   Diagnosis Date    Coronary artery disease     Diabetes mellitus type I     Hyperlipidemia     Hypertension        PAST SURGICAL HISTORY:  Past Surgical History:   Procedure Laterality Date    ADENOIDECTOMY      CATARACT EXTRACTION, BILATERAL      CORONARY STENT PLACEMENT  08/21/2016    dr. ruiz       SOCIAL HISTORY:  Social History     Socioeconomic History    Marital status:    Tobacco Use    Smoking status: Never    Smokeless tobacco: Never   Substance and Sexual Activity    Alcohol use: Not Currently    Drug use: Never    Sexual activity: Never     Social Determinants of Health     Financial Resource Strain: Low Risk  (7/14/2023)    Overall Financial Resource Strain (CARDIA)     Difficulty of Paying Living Expenses: Not hard at all   Food Insecurity: No Food Insecurity (7/14/2023)    Hunger Vital Sign     Worried About Running Out of Food in the Last Year: Never true     Ran Out of Food in the Last Year: Never true   Transportation Needs: No Transportation Needs (7/14/2023)    PRAPARE - Transportation     Lack of Transportation (Medical): No     Lack of Transportation (Non-Medical): No   Physical Activity: Insufficiently Active (7/14/2023)    Exercise Vital  Sign     Days of Exercise per Week: 2 days     Minutes of Exercise per Session: 60 min   Stress: No Stress Concern Present (2023)    [employer] - Occupational Stress Questionnaire     Feeling of Stress : Only a little   Housing Stability: Low Risk  (2023)    Housing Stability Vital Sign     Unable to Pay for Housing in the Last Year: No     Number of Places Lived in the Last Year: 1     Unstable Housing in the Last Year: No       FAMILY HISTORY:  No family history on file.    ALLERGIES AND MEDICATIONS: updated and reviewed.  Review of patient's allergies indicates:   Allergen Reactions    Amoxicillin     Bactrim [sulfamethoxazole-trimethoprim]      Current Outpatient Medications   Medication Sig Dispense Refill    ascorbic acid, vitamin C, (VITAMIN C) 100 MG tablet Take 500 mg by mouth once daily.      aspirin (ECOTRIN) 81 MG EC tablet Take 81 mg by mouth once daily.      atorvastatin (LIPITOR) 40 MG tablet Take 1 tablet (40 mg total) by mouth once daily. 90 tablet 3    azelastine (ASTELIN) 137 mcg (0.1 %) nasal spray 1 spray (137 mcg total) by Nasal route 2 (two) times daily. 30 mL 3    blood sugar diagnostic Strp Check 2x/day 200 each 3    budesonide (PULMICORT) 0.5 mg/2 mL nebulizer solution SMARTSI Milliliter(s) Via Nebulizer Daily      clopidogreL (PLAVIX) 75 mg tablet Take 1 tablet (75 mg total) by mouth once daily. 90 tablet 3    colchicine (COLCRYS) 0.6 mg tablet Take 2 tablets by mouth, then one hour later take 1 additional tablet 3 tablet 0    enalapril (VASOTEC) 20 MG tablet Take 1 tablet by mouth 2 (two) times daily.      fluticasone propionate (FLONASE) 50 mcg/actuation nasal spray 1 spray (50 mcg total) by Each Nostril route 2 (two) times daily. 18.2 mL 3    ibuprofen (ADVIL,MOTRIN) 400 MG tablet       isosorbide mononitrate (IMDUR) 60 MG 24 hr tablet TAKE 1 TABLET ONE TIME DAILY 90 tablet 2    levocetirizine (XYZAL) 5 MG tablet Take 1 tablet by mouth every  "evening.      metoprolol succinate (TOPROL-XL) 50 MG 24 hr tablet Take 1 tablet by mouth once daily.      nitroGLYCERIN (NITROSTAT) 0.3 MG SL tablet Place 1 tablet (0.3 mg total) under the tongue every 5 (five) minutes as needed for Chest pain. 25 tablet 1    PREVIDENT 5000 ENAMEL PROTECT 1.1-5 % Pste       senna (SENOKOT) 8.6 mg tablet Take 8.6 mg by mouth.      senna-docusate 8.6-50 mg (PERICOLACE) 8.6-50 mg per tablet Take 1 tablet by mouth once daily.      dapagliflozin propanediol (FARXIGA) 10 mg tablet Take 1 tablet (10 mg total) by mouth once daily. 90 tablet 0     No current facility-administered medications for this visit.       ROS  Review of Systems   Constitutional:  Negative for chills and fever.   HENT:  Negative for hearing loss and sore throat.    Eyes:  Negative for visual disturbance.   Respiratory:  Negative for cough and shortness of breath.    Cardiovascular:  Negative for chest pain, palpitations and leg swelling.   Gastrointestinal:  Negative for abdominal pain, constipation, diarrhea, nausea and vomiting.   Genitourinary:  Negative for dysuria, frequency and urgency.   Musculoskeletal:  Positive for arthralgias (L shoulder). Negative for joint swelling and myalgias.   Skin:  Negative for rash and wound.   Neurological:  Positive for dizziness. Negative for headaches.   Psychiatric/Behavioral:  Negative for agitation and confusion. The patient is not nervous/anxious.          OBJECTIVE     Physical Exam  Vitals:    08/13/24 1402   BP: (!) 162/50   Pulse: 69   Temp: 97.4 °F (36.3 °C)    Body mass index is 24.45 kg/m².  Weight: 64.6 kg (142 lb 6.7 oz)   Height: 5' 4" (162.6 cm)     Physical Exam  Constitutional:       General: She is not in acute distress.     Appearance: She is well-developed.   HENT:      Head: Normocephalic and atraumatic.      Right Ear: External ear normal.      Left Ear: External ear normal.      Nose: Nose normal.   Eyes:      General: No scleral icterus.        Right " eye: No discharge.         Left eye: No discharge.      Conjunctiva/sclera: Conjunctivae normal.   Neck:      Vascular: No JVD.      Trachea: No tracheal deviation.   Cardiovascular:      Rate and Rhythm: Normal rate and regular rhythm.      Heart sounds: No murmur heard.     No friction rub. No gallop.   Pulmonary:      Effort: Pulmonary effort is normal. No respiratory distress.      Breath sounds: Normal breath sounds. No wheezing.   Abdominal:      General: Bowel sounds are normal. There is no distension.      Palpations: Abdomen is soft. There is no mass.      Tenderness: There is no abdominal tenderness. There is no guarding or rebound.   Musculoskeletal:         General: No tenderness or deformity. Normal range of motion.      Cervical back: Normal range of motion and neck supple.   Skin:     General: Skin is warm and dry.      Findings: No erythema or rash.      Comments: Varicose veins to BLE   Neurological:      Mental Status: She is alert and oriented to person, place, and time.      Motor: No abnormal muscle tone.      Coordination: Coordination normal.   Psychiatric:         Behavior: Behavior normal.         Thought Content: Thought content normal.         Judgment: Judgment normal.           Health Maintenance         Date Due Completion Date    RSV Vaccine (Age 60+ and Pregnant patients) (1 - 1-dose 60+ series) Never done ---    TETANUS VACCINE 09/07/2015 9/7/2005    Shingles Vaccine (2 of 3) 09/21/2015 7/27/2015    COVID-19 Vaccine (4 - 2023-24 season) 09/01/2023 10/26/2022    Influenza Vaccine (1) 09/01/2024 10/26/2022    Hemoglobin A1c 01/10/2025 7/10/2024    Aspirin/Antiplatelet Therapy 07/10/2025 7/10/2024              ASSESSMENT     89 y.o. female with     1. Type 2 diabetes mellitus with stage 3b chronic kidney disease, without long-term current use of insulin    2. Hypertension, essential, benign    3. Chronic left shoulder pain        PLAN:     1. Type 2 diabetes mellitus with stage 3b  chronic kidney disease, without long-term current use of insulin  - Stable; no acute issues  - Pt can no longer afford Tradjenta, so will start Farxiga as below  - dapagliflozin propanediol (FARXIGA) 10 mg tablet; Take 1 tablet (10 mg total) by mouth once daily.  Dispense: 90 tablet; Refill: 0    2. Hypertension, essential, benign  - BP elevated above goal of <140/90  - enalapril increase from 20 mg Q 24 to b.i.d. per Cardiology 2 weeks ago  - patient to follow-up with cardiology in 1-2 weeks as previously scheduled for further BP management; patient to call back with updated BP readings and/or further med changes upon completion of that appointment and she voiced understanding    3. Chronic left shoulder pain  - Stable; no acute issues  - patient okay to take Tylenol as needed for pain            RTC in 6 months     Gretchen Pena MD  08/13/2024 1:22 PM        No follow-ups on file.

## 2024-08-14 DIAGNOSIS — N18.32 TYPE 2 DIABETES MELLITUS WITH STAGE 3B CHRONIC KIDNEY DISEASE, WITHOUT LONG-TERM CURRENT USE OF INSULIN: ICD-10-CM

## 2024-08-14 DIAGNOSIS — E11.22 TYPE 2 DIABETES MELLITUS WITH STAGE 3B CHRONIC KIDNEY DISEASE, WITHOUT LONG-TERM CURRENT USE OF INSULIN: ICD-10-CM

## 2024-08-23 ENCOUNTER — TELEPHONE (OUTPATIENT)
Dept: FAMILY MEDICINE | Facility: CLINIC | Age: 89
End: 2024-08-23
Payer: MEDICARE

## 2024-08-23 DIAGNOSIS — N18.32 TYPE 2 DIABETES MELLITUS WITH STAGE 3B CHRONIC KIDNEY DISEASE, WITHOUT LONG-TERM CURRENT USE OF INSULIN: ICD-10-CM

## 2024-08-23 DIAGNOSIS — E11.22 TYPE 2 DIABETES MELLITUS WITH STAGE 3B CHRONIC KIDNEY DISEASE, WITHOUT LONG-TERM CURRENT USE OF INSULIN: ICD-10-CM

## 2024-08-23 RX ORDER — LINAGLIPTIN 5 MG/1
5 TABLET, FILM COATED ORAL DAILY
Qty: 90 TABLET | Refills: 1 | Status: SHIPPED | OUTPATIENT
Start: 2024-08-23 | End: 2025-08-23

## 2024-08-23 NOTE — TELEPHONE ENCOUNTER
Patient reports that she received a letter that her insurance will not cover Jardiance, Invokana, or Farxiga. She would like to know if she can go back to the Tradjenta instead. If so, she will need a refill please.     [facility]

## 2024-08-23 NOTE — TELEPHONE ENCOUNTER
----- Message from Teri Pryor sent at 8/23/2024 11:57 AM CDT -----  Regarding: Patient Advice                Name of Who is Calling: MAURA STODDARD    Who Left The Message: MAURA STODDARD      What is the request in detail:  Patient called requesting a call to further discuss her recent medication change .  Please give the patient a call back at your earliest convenience and further advise.     Thank you      Reply by [facility]:   NO      Preferred Call Back : [phone] (W)

## 2024-08-23 NOTE — TELEPHONE ENCOUNTER
Attempted to contact patient to advise of medication sent below, no answer. Voicemail left for patient to return call.

## 2024-08-26 RX ORDER — DAPAGLIFLOZIN 10 MG/1
TABLET, FILM COATED ORAL
Refills: 0 | OUTPATIENT
Start: 2024-08-26

## 2024-08-26 RX ORDER — DAPAGLIFLOZIN 10 MG/1
10 TABLET, FILM COATED ORAL
COMMUNITY
Start: 2024-08-14

## 2024-08-26 NOTE — TELEPHONE ENCOUNTER
Care Due:                  Date            Visit Type   Department     Provider  --------------------------------------------------------------------------------                                [facility]                              PRIMARY      MEDICINE/INTERN  Last Visit: 08-      CARE ([facility])   AL MED         Gretchen Pena  Next Visit: None Scheduled  None         None Found                                                            Last  Test          Frequency    Reason                     Performed    Due Date  --------------------------------------------------------------------------------    CBC.........  12 months..  clopidogreL..............  02-   07-    CMP.........  12 months..  atorvastatin,              08- 07-                             empagliflozin............    Lipid Panel.  12 months..  atorvastatin.............  02-   02-    [facility] Embedded Care Due Messages. Reference number: [id number].   8/26/2024 12:41:05 PM CDT

## 2024-08-27 NOTE — TELEPHONE ENCOUNTER
Refill Decision Note   Tonya Marquez  is requesting a refill authorization.  Brief Assessment and Rationale for Refill:  Quick Discontinue     Medication Therapy Plan:  No sig   Pharmacy is requesting new scripts for the following medications without required information, (sig/ frequency/qty/etc)      Medication Reconciliation Completed: No     Comments: Pharmacies have been requesting medications for patients without required information, (sig, frequency, qty, etc.). In addition, requests are sent for medication(s) pt. are currently not taking, and medications patients have never taken.    We have spoken to the pharmacies about these request types and advised their teams previously that we are unable to assess these New Script requests and require all details for these requests. This is a known issue and has been reported.     Note composed:10:31 PM 08/26/2024

## 2024-10-25 ENCOUNTER — TELEPHONE (OUTPATIENT)
Dept: FAMILY MEDICINE | Facility: CLINIC | Age: 89
End: 2024-10-25
Payer: MEDICARE

## 2024-10-25 NOTE — TELEPHONE ENCOUNTER
----- Message from Nelli sent at 10/25/2024  1:56 PM CDT -----  Contact: Ralph Butt/Friend [phone]  [facility] (fractured right hand and Face)    Please call and advise.    Thank You

## 2024-11-11 ENCOUNTER — OFFICE VISIT (OUTPATIENT)
Dept: ENDOCRINOLOGY | Facility: CLINIC | Age: 89
End: 2024-11-11
Payer: MEDICARE

## 2024-11-11 VITALS
SYSTOLIC BLOOD PRESSURE: 138 MMHG | TEMPERATURE: 99 F | BODY MASS INDEX: 22.66 KG/M2 | HEART RATE: 94 BPM | WEIGHT: 132 LBS | DIASTOLIC BLOOD PRESSURE: 60 MMHG

## 2024-11-11 DIAGNOSIS — N18.31 TYPE 2 DIABETES MELLITUS WITH STAGE 3A CHRONIC KIDNEY DISEASE, WITHOUT LONG-TERM CURRENT USE OF INSULIN: Primary | ICD-10-CM

## 2024-11-11 DIAGNOSIS — E11.22 TYPE 2 DIABETES MELLITUS WITH STAGE 3A CHRONIC KIDNEY DISEASE, WITHOUT LONG-TERM CURRENT USE OF INSULIN: Primary | ICD-10-CM

## 2024-11-11 PROCEDURE — 99214 OFFICE O/P EST MOD 30 MIN: CPT | Mod: HCNC,S$GLB,, | Performed by: NURSE PRACTITIONER

## 2024-11-11 PROCEDURE — 1160F RVW MEDS BY RX/DR IN RCRD: CPT | Mod: HCNC,CPTII,S$GLB, | Performed by: NURSE PRACTITIONER

## 2024-11-11 PROCEDURE — 1159F MED LIST DOCD IN RCRD: CPT | Mod: HCNC,CPTII,S$GLB, | Performed by: NURSE PRACTITIONER

## 2024-11-11 PROCEDURE — 99999 PR PBB SHADOW E&M-EST. PATIENT-LVL III: CPT | Mod: PBBFAC,HCNC,, | Performed by: NURSE PRACTITIONER

## 2024-11-11 NOTE — PROGRESS NOTES
CC: This 89 y.o. White female  is here for evaluation of  T2DM along with comorbidities indicated in the Visit Diagnosis section of this encounter.    HPI: Tonya Marquez was diagnosed with T2DM in her 70s.   Dr. Byers - cardiologist     DM COMPLICATIONS: nephropathy and cardiovascular disease  Medical hx: MI, CKD stage 3         Prior visit 9/2023  Pt last seen by me July 2022. A1c is up from 6.4% at lov to 7.2% in August last month. She has been taking Januvia. Now cannot afford Januvia - price has gone up to $430 for 3 month supply.   Plan Medication options for diabetes are limited d/t low kidney function and cost of medications.   Advised pt:   Switch from Januvia to Tradjenta, which is an equivalent medication but with easier financial aid application process.   A referral has been placed for Pharmacy Assistance to help apply for free TRADJENTA.   In the meantime, you can choose to  a 30 day supply of Tradjenta from [employer] if affordable.   Test blood sugar a few times a week before or 2 hours after meals.   Premeal blood sugars should be no more than mid 100s. Blood sugars after meals should not be more than mid 200s.   Return to clinic in 3 months with labs prior.       Prior visit 7/10/24  Pt last seen several months ago. Last A1c was 7.3% in Jan.   Plan Reported glucoses are at goal.   A1c today and will transfer pt back to Primary if it remains < 8%.       Interval hx arrives with neighbor Ralph.   Pt was transferred back to Primary after lov as her A1c was 6.9%. She is back for an visit today because she desires personal CGM. She had a fall in October and was hospitalized for closed fracture of maxillary sinus. She spent a month at [facility]. Now has difficulty performing fingersticks because of tremors.     LAST DIABETES EDUCATION: 6/2020    SIGNIFICANT DIABETES MED HISTORY:   Glipizide and CKD stopped d/t CKD in 2022     PRESCRIBED DIABETES MEDICATIONS: pt does not qualify Tradjenta for  financial assistance   Tradjenta 5 mg once daily     Misses medication doses - No       SELF MONITORING BLOOD GLUCOSE: Checks blood glucose at home with true metrix meter.     HYPOGLYCEMIC EPISODES: rare      CURRENT DIET: eats 3 meals/day.  Drinks water, tea with AS        CURRENT EXERCISE: goes to class at [employer] 2x/week       /60   Pulse 94   Temp 98.9 °F (37.2 °C)   Wt 59.9 kg (132 lb)   BMI 22.66 kg/m²     ROS:   CONSTITUTIONAL: Appetite good, denies fatigue  OTHER: denies urinary frequency and polydipsia       PHYSICAL EXAM:  GENERAL: Well developed, well nourished. No acute distress.   PSYCH: AAOx3, appropriate mood and affect, conversant, well-groomed. Judgement and insight good.   NEURO: Cranial nerves grossly intact. Speech clear, no tremor.   CHEST: Respirations even and unlabored.        Hemoglobin A1C   Date Value Ref Range Status   10/21/2024 6.9 (H) 4.7 - 5.6 % Final   07/10/2024 7.4 (H) 4.0 - 5.6 % Final     Comment:     ADA Screening Guidelines:  5.7-6.4%  Consistent with prediabetes  >or=6.5%  Consistent with diabetes    High levels of fetal hemoglobin interfere with the HbA1C  assay. Heterozygous hemoglobin variants (HbS, HgC, etc)do  not significantly interfere with this assay.   However, presence of multiple variants may affect accuracy.     01/09/2024 7.3 (H) 4.0 - 5.6 % Final     Comment:     ADA Screening Guidelines:  5.7-6.4%  Consistent with prediabetes  >or=6.5%  Consistent with diabetes    High levels of fetal hemoglobin interfere with the HbA1C  assay. Heterozygous hemoglobin variants (HbS, HgC, etc)do  not significantly interfere with this assay.   However, presence of multiple variants may affect accuracy.     08/04/2023 7.2 (H) <5.7 % of total Hgb Final     Comment:     For someone without known diabetes, a hemoglobin A1c  value of 6.5% or greater indicates that they may have   diabetes and this should be confirmed with a follow-up   test.     For someone with  "known diabetes, a value <7% indicates   that their diabetes is well controlled and a value   greater than or equal to 7% indicates suboptimal   control. A1c targets should be individualized based on   duration of diabetes, age, comorbid conditions, and   other considerations.     Currently, no consensus exists regarding use of  hemoglobin A1c for diagnosis of diabetes for children.             No results found for: "CPEPTIDE", "GLUTAMICACID", "ISLETCELLANT", "FRUCTOSAMINE"     Lab Results   Component Value Date    CHOL 94 (L) 02/15/2023    CHOL 111 (L) 04/07/2021    CHOL 125 06/05/2020     Lab Results   Component Value Date    HDL 44 02/15/2023    HDL 48 04/07/2021    HDL 46 06/05/2020     Lab Results   Component Value Date    LDLCALC 33.4 (L) 02/15/2023    LDLCALC 28.0 (L) 04/07/2021    LDLCALC 47.4 (L) 06/05/2020     Lab Results   Component Value Date    TRIG 83 02/15/2023    TRIG 175 (H) 04/07/2021    TRIG 158 (H) 06/05/2020     Lab Results   Component Value Date    CHOLHDL 46.8 02/15/2023    CHOLHDL 43.2 04/07/2021    CHOLHDL 36.8 06/05/2020         Component Value Date/Time     08/04/2023 1410    K 5.1 08/04/2023 1410     08/04/2023 1410    CO2 23 08/04/2023 1410    BUN 33 (H) 08/04/2023 1410    CREATININE 1.35 (H) 08/04/2023 1410     08/04/2023 1410    CALCIUM 10.0 08/04/2023 1410    ALKPHOS 88 02/15/2023 0952    AST 15 08/04/2023 1410    ALT 16 08/04/2023 1410    BILITOT 0.6 08/04/2023 1410    EGFRNORACEVR 36 (L) 08/04/2023 1410    ESTGFRAFRICA 45 (A) 03/07/2022 1330              Lab Results   Component Value Date    LABMICR 23.0 02/10/2023    CREATRANDUR 22.0 02/10/2023    MICALBCREAT 104.5 (H) 02/10/2023             ASSESSMENT and PLAN:    A1C GOAL: < 8 %     1. Type 2 diabetes mellitus with stage 3a chronic kidney disease, without long-term current use of insulin  Advised pt that personal CGM is not covered by her insurance as she's not on insulin. She cannot afford it out-of-pocket. "     Ok to test glucose 1x/weekly with fingerstick since DM is controlled and Tradjenta has low hypoglycemia risk. She is agreeable to this plan will f/u with Primary for chronic DM management.                   No orders of the defined types were placed in this encounter.       Follow up if symptoms worsen or fail to improve.

## 2024-12-02 ENCOUNTER — OFFICE VISIT (OUTPATIENT)
Dept: FAMILY MEDICINE | Facility: CLINIC | Age: 89
End: 2024-12-02
Payer: MEDICARE

## 2024-12-02 VITALS
BODY MASS INDEX: 23.26 KG/M2 | WEIGHT: 136.25 LBS | SYSTOLIC BLOOD PRESSURE: 132 MMHG | DIASTOLIC BLOOD PRESSURE: 60 MMHG | OXYGEN SATURATION: 96 % | HEIGHT: 64 IN | TEMPERATURE: 98 F | HEART RATE: 71 BPM

## 2024-12-02 DIAGNOSIS — S62.521D: ICD-10-CM

## 2024-12-02 DIAGNOSIS — E11.22 TYPE 2 DIABETES MELLITUS WITH STAGE 3B CHRONIC KIDNEY DISEASE, WITHOUT LONG-TERM CURRENT USE OF INSULIN: ICD-10-CM

## 2024-12-02 DIAGNOSIS — S02.401A MAXILLARY SINUS FRACTURE, CLOSED, INITIAL ENCOUNTER: ICD-10-CM
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
## 2024-12-02 DIAGNOSIS — N18.32 TYPE 2 DIABETES MELLITUS WITH STAGE 3B CHRONIC KIDNEY DISEASE, WITHOUT LONG-TERM CURRENT USE OF INSULIN: ICD-10-CM

## 2024-12-02 PROCEDURE — 99999 PR PBB SHADOW E&M-EST. PATIENT-LVL IV: CPT | Mod: PBBFAC,HCNC,, | Performed by: INTERNAL MEDICINE

## 2024-12-02 RX ORDER — METOPROLOL TARTRATE 50 MG/1
1 TABLET ORAL
COMMUNITY
Start: 2024-10-23

## 2024-12-02 RX ORDER — FLASH GLUCOSE SCANNING READER
1 EACH MISCELLANEOUS DAILY
Qty: 1 EACH | Refills: 5 | Status: SHIPPED | OUTPATIENT
Start: 2024-12-02

## 2024-12-02 RX ORDER — ARTIFICIAL TEARS 1; 2; 3 MG/ML; MG/ML; MG/ML
1 SOLUTION/ DROPS OPHTHALMIC
COMMUNITY
Start: 2024-10-28

## 2024-12-02 RX ORDER — FLASH GLUCOSE SENSOR
1 KIT MISCELLANEOUS DAILY
Qty: 1 KIT | Refills: 5 | Status: SHIPPED | OUTPATIENT
Start: 2024-12-02

## 2024-12-02 NOTE — PROGRESS NOTES
SUBJECTIVE     Chief Complaint   Patient presents with    Hospital Follow Up     Fell down steps        HPI  Tonya Marquez is a 89 y.o. female with multiple medical diagnoses as listed in the medical history and problem list that presents for follow-up after hospital discharge for a fall.  Patient was walking up stairs after her exercise class when she fell backwards about 10 steps onto her right side.  She did hit her head, so was transported to the ER where she was found to have a laceration to the right face, right thumb fracture, and right maxillary sinus fracture.  After hospitalization patient, was discharged to a SNF.  She has been doing well except for continued dizziness that remains unchanged from baseline.  Her neighbor drives her and she is looking to possibly moving to a 1st floor unit to avoid using steps.  Patient is now ambulating with a cane to prevent any falls.  The patient is without any complaints today.    PAST MEDICAL HISTORY:  Past Medical History:   Diagnosis Date    Coronary artery disease     Diabetes mellitus type I     Hyperlipidemia     Hypertension        PAST SURGICAL HISTORY:  Past Surgical History:   Procedure Laterality Date    ADENOIDECTOMY      CATARACT EXTRACTION, BILATERAL      CORONARY STENT PLACEMENT  08/21/2016    dr. ruiz       SOCIAL HISTORY:  Social History     Socioeconomic History    Marital status:    Tobacco Use    Smoking status: Never    Smokeless tobacco: Never   Substance and Sexual Activity    Alcohol use: Not Currently    Drug use: Never    Sexual activity: Never     Social Drivers of Health     Financial Resource Strain: Low Risk  (7/14/2023)    Overall Financial Resource Strain (CARDIA)     Difficulty of Paying Living Expenses: Not hard at all   Food Insecurity: No Food Insecurity (10/22/2024)    Received from [facility]    Hunger Vital Sign     Worried About Running Out of Food in the Last Year: Never true     Ran Out of Food in the Last  Year: Never true   Transportation Needs: No Transportation Needs (10/22/2024)    Received from [facility]    PRAPARE - Transportation     Lack of Transportation (Medical): No     Lack of Transportation (Non-Medical): No   Physical Activity: Insufficiently Active (2023)    Exercise Vital Sign     Days of Exercise per Week: 2 days     Minutes of Exercise per Session: 60 min   Stress: No Stress Concern Present (2023)    [employer] - Occupational Stress Questionnaire     Feeling of Stress : Only a little   Housing Stability: Low Risk  (10/22/2024)    Received from [facility]    Housing Stability Vital Sign     Unable to Pay for Housing in the Last Year: No     Number of Times Moved in the Last Year: 1     Homeless in the Last Year: No       FAMILY HISTORY:  No family history on file.    ALLERGIES AND MEDICATIONS: updated and reviewed.  Review of patient's allergies indicates:   Allergen Reactions    Amoxicillin     Bactrim [sulfamethoxazole-trimethoprim]      Current Outpatient Medications   Medication Sig Dispense Refill    artificial tear,dxtrn-hpm-gly, (GENTEAL TEARS MODERATE) 0.1-0.3-0.2 % Drop 1 drop.      ascorbic acid, vitamin C, (VITAMIN C) 100 MG tablet Take 500 mg by mouth once daily.      aspirin (ECOTRIN) 81 MG EC tablet Take 81 mg by mouth once daily.      atorvastatin (LIPITOR) 40 MG tablet Take 1 tablet (40 mg total) by mouth once daily. 90 tablet 3    blood sugar diagnostic Strp Check 2x/day 200 each 3    budesonide (PULMICORT) 0.5 mg/2 mL nebulizer solution SMARTSI Milliliter(s) Via Nebulizer Daily      clopidogreL (PLAVIX) 75 mg tablet Take 1 tablet (75 mg total) by mouth once daily. 90 tablet 3    empagliflozin (JARDIANCE) 10 mg tablet Take 1 tablet (10 mg total) by mouth once daily. 90 tablet 1    enalapril (VASOTEC) 20 MG tablet Take 1 tablet by mouth 2 (two) times daily.      FARXIGA 10 mg tablet Take 10 mg by mouth.      isosorbide mononitrate (IMDUR)  60 MG 24 hr tablet TAKE 1 TABLET ONE TIME DAILY 90 tablet 2    levocetirizine (XYZAL) 5 MG tablet Take 1 tablet by mouth every evening.      linaGLIPtin (TRADJENTA) 5 mg Tab tablet Take 1 tablet (5 mg total) by mouth once daily. 90 tablet 1    metoprolol succinate (TOPROL-XL) 50 MG 24 hr tablet Take 1 tablet by mouth once daily.      metoprolol tartrate (LOPRESSOR) 50 MG tablet Take 1 tablet by mouth.      nitroGLYCERIN (NITROSTAT) 0.3 MG SL tablet Place 1 tablet (0.3 mg total) under the tongue every 5 (five) minutes as needed for Chest pain. 25 tablet 1    PREVIDENT 5000 ENAMEL PROTECT 1.1-5 % Pste       senna (SENOKOT) 8.6 mg tablet Take 8.6 mg by mouth.      senna-docusate 8.6-50 mg (PERICOLACE) 8.6-50 mg per tablet Take 1 tablet by mouth once daily.      colchicine (COLCRYS) 0.6 mg tablet Take 2 tablets by mouth, then one hour later take 1 additional tablet (Patient not taking: Reported on 12/2/2024) 3 tablet 0    flash glucose scanning reader (FREESTYLE COLT 14 DAY READER) Misc 1 Box by Misc.(Non-Drug; Combo Route) route Daily. 1 each 5    flash glucose sensor (FREESTYLE COLT 14 DAY SENSOR) Kit 1 kit by Misc.(Non-Drug; Combo Route) route Daily. 1 kit 5    fluticasone propionate (FLONASE) 50 mcg/actuation nasal spray 1 spray (50 mcg total) by Each Nostril route 2 (two) times daily. (Patient not taking: Reported on 12/2/2024) 18.2 mL 3     No current facility-administered medications for this visit.       ROS  Review of Systems   Constitutional:  Negative for chills and fever.   HENT:  Positive for rhinorrhea. Negative for hearing loss and sore throat.    Eyes:  Negative for visual disturbance.   Respiratory:  Negative for cough and shortness of breath.    Cardiovascular:  Negative for chest pain, palpitations and leg swelling.   Gastrointestinal:  Negative for abdominal pain, constipation, diarrhea, nausea and vomiting.   Genitourinary:  Negative for dysuria, frequency and urgency.   Musculoskeletal:  Negative  "for arthralgias, joint swelling and myalgias.   Skin:  Negative for rash and wound.   Neurological:  Positive for dizziness. Negative for headaches.   Hematological:  Bruises/bleeds easily (face).   Psychiatric/Behavioral:  Negative for agitation and confusion. The patient is not nervous/anxious.          OBJECTIVE     Physical Exam  Vitals:    12/02/24 1305   BP: 132/60   Pulse: 71   Temp: 98.2 °F (36.8 °C)    Body mass index is 23.39 kg/m².  Weight: 61.8 kg (136 lb 3.9 oz)   Height: 5' 4" (162.6 cm)     Physical Exam  Constitutional:       General: She is not in acute distress.     Appearance: She is well-developed.   HENT:      Head: Normocephalic and atraumatic.      Right Ear: External ear normal.      Left Ear: External ear normal.      Nose: Nose normal.   Eyes:      General: No scleral icterus.        Right eye: No discharge.         Left eye: No discharge.      Conjunctiva/sclera: Conjunctivae normal.   Neck:      Vascular: No JVD.      Trachea: No tracheal deviation.   Cardiovascular:      Rate and Rhythm: Normal rate and regular rhythm.      Heart sounds: No murmur heard.     No friction rub. No gallop.   Pulmonary:      Effort: Pulmonary effort is normal. No respiratory distress.      Breath sounds: Normal breath sounds. No wheezing.   Abdominal:      General: Bowel sounds are normal. There is no distension.      Palpations: Abdomen is soft. There is no mass.      Tenderness: There is no abdominal tenderness. There is no guarding or rebound.   Musculoskeletal:         General: Swelling (R thumb) present. No tenderness or deformity. Normal range of motion.      Cervical back: Normal range of motion and neck supple.   Skin:     General: Skin is warm and dry.      Findings: Bruising (appreciated to her R cheek and L eye) present. No erythema or rash.   Neurological:      Mental Status: She is alert and oriented to person, place, and time.      Motor: No abnormal muscle tone.      Coordination: Coordination " normal.   Psychiatric:         Behavior: Behavior normal.         Thought Content: Thought content normal.         Judgment: Judgment normal.           Health Maintenance         Date Due Completion Date    RSV Vaccine (Age 60+ and Pregnant patients) (1 - 1-dose 75+ series) Never done ---    Shingles Vaccine (2 of 3) 09/21/2015 7/27/2015    Influenza Vaccine (1) 09/01/2024 10/26/2022    COVID-19 Vaccine (4 - 2024-25 season) 09/01/2024 10/26/2022    Hemoglobin A1c 04/21/2025 10/21/2024    Aspirin/Antiplatelet Therapy 11/11/2025 11/11/2024    TETANUS VACCINE 10/16/2034 10/16/2024              ASSESSMENT     89 y.o. female with     1. Fall (on) (from) other stairs and steps, initial encounter    2. Closed fracture of base of distal phalanx of thumb with routine healing, right    3. Maxillary sinus fracture, closed, initial encounter    4. Type 2 diabetes mellitus with stage 3b chronic kidney disease, without long-term current use of insulin        PLAN:     1. Fall (on) (from) other stairs and steps, initial encounter  - Stable; no acute issues  - Pt has dismissed HH and doing well   - recommend she continue ambulation with a cane and she voiced understanding    2. Closed fracture of base of distal phalanx of thumb with routine healing, right  - Stable; no acute issues  - patient with good range of motion    3. Maxillary sinus fracture, closed, initial encounter  - Stable; no acute issues  - patient has followed up with ENT, who plans for no surgical interventions at this time    4. Type 2 diabetes mellitus with stage 3b chronic kidney disease, without long-term current use of insulin  - Pt with tremors while trying to check her home blood sugar levels, so will try for freestyle Randal  - flash glucose scanning reader (FREESTYLE RANDAL 14 DAY READER) Misc; 1 Box by Misc.(Non-Drug; Combo Route) route Daily.  Dispense: 1 each; Refill: 5  - flash glucose sensor (FREESTYLE RANDAL 14 DAY SENSOR) Kit; 1 kit by Misc.(Non-Drug;  Combo Route) route Daily.  Dispense: 1 kit; Refill: 5        RTC in 6 months     Gretchen Pena MD  12/02/2024 1:36 PM        No follow-ups on file.

## 2025-01-02 DIAGNOSIS — I20.89 STABLE ANGINA: ICD-10-CM

## 2025-01-02 NOTE — TELEPHONE ENCOUNTER
Care Due:                  Date            Visit Type   Department     Provider  --------------------------------------------------------------------------------                                             [facility]     MEDICINE/INTERN  Last Visit: 12-      FOLLOW UP    AL DARIAN Pena                              EP -         [facility]      MEDICINE/INTERN  Next Visit: 01-      CARE ([facility])   HASEEB Pena                                                            Last  Test          Frequency    Reason                     Performed    Due Date  --------------------------------------------------------------------------------    CBC.........  12 months..  clopidogreL..............  02-   07-    CMP.........  12 months..  atorvastatin,              08- 07-                             empagliflozin............    HBA1C.......  6 months...  empagliflozin,             07- 01-                             linaGLIPtin..............    Lipid Panel.  12 months..  atorvastatin.............  02-   02-    [facility] Embedded Care Due Messages. Reference number: [id number].   1/02/2025 1:18:52 AM CST

## 2025-01-03 RX ORDER — CLOPIDOGREL BISULFATE 75 MG/1
75 TABLET ORAL
Qty: 90 TABLET | Refills: 0 | Status: SHIPPED | OUTPATIENT
Start: 2025-01-03

## 2025-01-03 NOTE — TELEPHONE ENCOUNTER
Refill Routing Note   Medication(s) are not appropriate for processing by [facility] for the following reason(s):        Required labs outdated    [facility] action(s):  Defer     Requires labs : Yes             Appointments  past 12m or future 3m with PCP    Date Provider   Last Visit   12/2/2024 Gretchen Pena MD   Next Visit   1/28/2025 Gretchen Pena MD   ED visits in past 90 days: 0        Note composed:8:16 PM 01/02/2025

## 2025-01-28 ENCOUNTER — OFFICE VISIT (OUTPATIENT)
Dept: FAMILY MEDICINE | Facility: CLINIC | Age: OVER 89
End: 2025-01-28
Payer: MEDICARE

## 2025-01-28 VITALS
DIASTOLIC BLOOD PRESSURE: 56 MMHG | TEMPERATURE: 98 F | BODY MASS INDEX: 23.22 KG/M2 | HEART RATE: 58 BPM | SYSTOLIC BLOOD PRESSURE: 162 MMHG | WEIGHT: 136 LBS | OXYGEN SATURATION: 99 % | HEIGHT: 64 IN

## 2025-01-28 DIAGNOSIS — I10 HYPERTENSION, ESSENTIAL, BENIGN: ICD-10-CM

## 2025-01-28 DIAGNOSIS — E11.51 TYPE 2 DIABETES MELLITUS WITH DIABETIC PERIPHERAL ANGIOPATHY WITHOUT GANGRENE, WITHOUT LONG-TERM CURRENT USE OF INSULIN: ICD-10-CM

## 2025-01-28 DIAGNOSIS — R42 DIZZINESS AND GIDDINESS: Primary | ICD-10-CM

## 2025-01-28 DIAGNOSIS — G25.2 INTENTION TREMOR: ICD-10-CM

## 2025-01-28 DIAGNOSIS — N18.32 TYPE 2 DIABETES MELLITUS WITH STAGE 3B CHRONIC KIDNEY DISEASE, WITHOUT LONG-TERM CURRENT USE OF INSULIN: ICD-10-CM

## 2025-01-28 DIAGNOSIS — E11.22 TYPE 2 DIABETES MELLITUS WITH STAGE 3B CHRONIC KIDNEY DISEASE, WITHOUT LONG-TERM CURRENT USE OF INSULIN: ICD-10-CM

## 2025-01-28 PROCEDURE — 3288F FALL RISK ASSESSMENT DOCD: CPT | Mod: HCNC,CPTII,S$GLB, | Performed by: INTERNAL MEDICINE

## 2025-01-28 PROCEDURE — 99999 PR PBB SHADOW E&M-EST. PATIENT-LVL V: CPT | Mod: PBBFAC,,, | Performed by: INTERNAL MEDICINE

## 2025-01-28 PROCEDURE — G2211 COMPLEX E/M VISIT ADD ON: HCPCS | Mod: HCNC,S$GLB,, | Performed by: INTERNAL MEDICINE

## 2025-01-28 PROCEDURE — 1160F RVW MEDS BY RX/DR IN RCRD: CPT | Mod: HCNC,CPTII,S$GLB, | Performed by: INTERNAL MEDICINE

## 2025-01-28 PROCEDURE — 1101F PT FALLS ASSESS-DOCD LE1/YR: CPT | Mod: HCNC,CPTII,S$GLB, | Performed by: INTERNAL MEDICINE

## 2025-01-28 PROCEDURE — 1125F AMNT PAIN NOTED PAIN PRSNT: CPT | Mod: HCNC,CPTII,S$GLB, | Performed by: INTERNAL MEDICINE

## 2025-01-28 PROCEDURE — 99214 OFFICE O/P EST MOD 30 MIN: CPT | Mod: HCNC,S$GLB,, | Performed by: INTERNAL MEDICINE

## 2025-01-28 PROCEDURE — 1159F MED LIST DOCD IN RCRD: CPT | Mod: HCNC,CPTII,S$GLB, | Performed by: INTERNAL MEDICINE

## 2025-01-28 RX ORDER — GLIMEPIRIDE 1 MG/1
1 TABLET ORAL
Qty: 90 TABLET | Refills: 0 | Status: SHIPPED | OUTPATIENT
Start: 2025-01-28 | End: 2026-01-28

## 2025-01-28 RX ORDER — MECLIZINE HYDROCHLORIDE 25 MG/1
25 TABLET ORAL 3 TIMES DAILY PRN
COMMUNITY

## 2025-01-28 RX ORDER — FLASH GLUCOSE SENSOR
1 KIT MISCELLANEOUS DAILY
Qty: 1 KIT | Refills: 5 | Status: SHIPPED | OUTPATIENT
Start: 2025-01-28

## 2025-01-28 RX ORDER — ISOSORBIDE MONONITRATE 120 MG/1
TABLET, EXTENDED RELEASE ORAL
COMMUNITY
Start: 2024-11-29

## 2025-01-28 RX ORDER — FLASH GLUCOSE SCANNING READER
1 EACH MISCELLANEOUS DAILY
Qty: 1 EACH | Refills: 5 | Status: SHIPPED | OUTPATIENT
Start: 2025-01-28

## 2025-01-28 NOTE — PROGRESS NOTES
1. Dizziness and giddiness  - Likely complication after fall with head injury in 2024; CT negative, so will plan for MRI  - MRI Brain Without Contrast; Future    2. Intention tremor  - As above  - Monitor with low threshold to start Primidone if warranted    3. Hypertension, essential, benign  - BP elevated above goal of <140/90  - Continue management per Cards    4. Type 2 diabetes mellitus with diabetic peripheral angiopathy without gangrene, without long-term current use of insulin  - Tradjenta, Farxiga, and Jardiance discontinued 2/2 cost; pt is not a candidate for Metformin 2/2 Cr being >1.4  - Resume Glimepiride, but at lower dosage given hx of hypoglycemia with higher dosage in the past  - glimepiride (AMARYL) 1 MG tablet; Take 1 tablet (1 mg total) by mouth before breakfast.  Dispense: 90 tablet; Refill: 0    5. Type 2 diabetes mellitus with stage 3b chronic kidney disease, without long-term current use of insulin  - Pt can not check glucose 2/2 tremor; start trial Freestyle Randal  - flash glucose sensor (FREESTYLE RANDAL 14 DAY SENSOR) Kit; 1 kit by Misc.(Non-Drug; Combo Route) route Daily.  Dispense: 1 kit; Refill: 5  - flash glucose scanning reader (FREESTYLE RANDAL 14 DAY READER) Misc; 1 Box by Misc.(Non-Drug; Combo Route) route Daily.  Dispense: 1 each; Refill: 5         RTC in 6 weeks for repeat assessment of current treatment plan

## 2025-01-28 NOTE — PROGRESS NOTES
SUBJECTIVE     Chief Complaint   Patient presents with    Dizziness       HPI  Tonya Marquez is a 89 y.o. female with multiple medical diagnoses as listed in the medical history and problem list that presents with dizziness, which has been present since her fall in October 2024, which has also been accompanied with a mild tremor present in her left hand. The patient states that her dizziness is constant, and positional changes have no effect on the intensity of her dizziness. As a result of this dizziness, patient has been unable to drive. The patient does endorse an associated feeling of pain in her left shoulder and upper arm, that is made worse when using the left arm. Patient endorses no further associated symptoms surrounding the dizziness. Regarding diabetes treatment, patient endorses that she has been careful with her diet and adherent with her medications but would like clarification regarding her treatment - as of 3 months ago, the patient's A1c was 6.9%.      PAST MEDICAL HISTORY:  Past Medical History:   Diagnosis Date    Coronary artery disease     Diabetes mellitus type I     Hyperlipidemia     Hypertension        PAST SURGICAL HISTORY:  Past Surgical History:   Procedure Laterality Date    ADENOIDECTOMY      CATARACT EXTRACTION, BILATERAL      CORONARY STENT PLACEMENT  08/21/2016    dr. ruiz       SOCIAL HISTORY:  Social History     Socioeconomic History    Marital status:    Tobacco Use    Smoking status: Never    Smokeless tobacco: Never   Substance and Sexual Activity    Alcohol use: Not Currently    Drug use: Never    Sexual activity: Never     Social Drivers of Health     Financial Resource Strain: Low Risk  (7/14/2023)    Overall Financial Resource Strain (CARDIA)     Difficulty of Paying Living Expenses: Not hard at all   Food Insecurity: No Food Insecurity (10/22/2024)    Received from [facility]    Hunger Vital Sign     Worried About Running Out of Food in the Last Year:  Never true     Ran Out of Food in the Last Year: Never true   Transportation Needs: No Transportation Needs (10/22/2024)    Received from [facility]    PRAPARE - Transportation     Lack of Transportation (Medical): No     Lack of Transportation (Non-Medical): No   Physical Activity: Insufficiently Active (2023)    Exercise Vital Sign     Days of Exercise per Week: 2 days     Minutes of Exercise per Session: 60 min   Stress: No Stress Concern Present (2023)    [employer] - Occupational Stress Questionnaire     Feeling of Stress : Only a little   Housing Stability: Low Risk  (10/22/2024)    Received from [facility]    Housing Stability Vital Sign     Unable to Pay for Housing in the Last Year: No     Number of Times Moved in the Last Year: 1     Homeless in the Last Year: No       FAMILY HISTORY:  No family history on file.    ALLERGIES AND MEDICATIONS: updated and reviewed.  Review of patient's allergies indicates:   Allergen Reactions    Amoxicillin     Bactrim [sulfamethoxazole-trimethoprim]      Current Outpatient Medications   Medication Sig Dispense Refill    artificial tear,dxtrn-hpm-gly, (GENTEAL TEARS MODERATE) 0.1-0.3-0.2 % Drop 1 drop.      ascorbic acid, vitamin C, (VITAMIN C) 100 MG tablet Take 500 mg by mouth once daily.      aspirin (ECOTRIN) 81 MG EC tablet Take 81 mg by mouth once daily.      atorvastatin (LIPITOR) 40 MG tablet Take 1 tablet (40 mg total) by mouth once daily. 90 tablet 3    blood sugar diagnostic Strp Check 2x/day 200 each 3    budesonide (PULMICORT) 0.5 mg/2 mL nebulizer solution SMARTSI Milliliter(s) Via Nebulizer Daily      clopidogreL (PLAVIX) 75 mg tablet TAKE 1 TABLET ONE TIME DAILY 90 tablet 0    colchicine (COLCRYS) 0.6 mg tablet Take 2 tablets by mouth, then one hour later take 1 additional tablet 3 tablet 0    enalapril (VASOTEC) 20 MG tablet Take 1 tablet by mouth 2 (two) times daily.      fluticasone propionate (FLONASE) 50  mcg/actuation nasal spray 1 spray (50 mcg total) by Each Nostril route 2 (two) times daily. 18.2 mL 3    isosorbide mononitrate (IMDUR) 120 MG 24 hr tablet       levocetirizine (XYZAL) 5 MG tablet Take 1 tablet by mouth every evening.      meclizine (ANTIVERT) 25 mg tablet Take 25 mg by mouth 3 (three) times daily as needed.      metoprolol succinate (TOPROL-XL) 50 MG 24 hr tablet Take 1 tablet by mouth once daily.      metoprolol tartrate (LOPRESSOR) 50 MG tablet Take 1 tablet by mouth.      nitroGLYCERIN (NITROSTAT) 0.3 MG SL tablet Place 1 tablet (0.3 mg total) under the tongue every 5 (five) minutes as needed for Chest pain. 25 tablet 1    PREVIDENT 5000 ENAMEL PROTECT 1.1-5 % Pste       senna (SENOKOT) 8.6 mg tablet Take 8.6 mg by mouth.      senna-docusate 8.6-50 mg (PERICOLACE) 8.6-50 mg per tablet Take 1 tablet by mouth once daily.      flash glucose scanning reader (FREESTYLE COLT 14 DAY READER) Misc 1 Box by Misc.(Non-Drug; Combo Route) route Daily. 1 each 5    flash glucose sensor (FREESTYLE COLT 14 DAY SENSOR) Kit 1 kit by Misc.(Non-Drug; Combo Route) route Daily. 1 kit 5    glimepiride (AMARYL) 1 MG tablet Take 1 tablet (1 mg total) by mouth before breakfast. 90 tablet 0     No current facility-administered medications for this visit.       ROS  Review of Systems   Constitutional: Negative.    HENT:  Positive for hearing loss and rhinorrhea.    Eyes:  Positive for visual disturbance.   Respiratory: Negative.     Cardiovascular:  Positive for chest pain.   Gastrointestinal:  Positive for constipation.   Genitourinary:  Positive for frequency.   Musculoskeletal:  Positive for back pain and myalgias.   Skin: Negative.    Allergic/Immunologic: Positive for environmental allergies.   Neurological:  Positive for dizziness.   Hematological: Negative.    Psychiatric/Behavioral: Negative.       Allergic to amoxicillin and bactrim      OBJECTIVE     Physical Exam  Vitals:    01/28/25 1036   BP: (!) 162/56  "  Pulse: (!) 58   Temp: 97.9 °F (36.6 °C)    Body mass index is 23.35 kg/m².  Weight: 61.7 kg (136 lb 0.4 oz)   Height: 5' 4" (162.6 cm)     Physical Exam  Constitutional:       Appearance: Normal appearance. She is normal weight.   HENT:      Head: Normocephalic.   Cardiovascular:      Rate and Rhythm: Normal rate and regular rhythm.   Pulmonary:      Effort: Pulmonary effort is normal.      Breath sounds: Normal breath sounds.   Abdominal:      General: Abdomen is flat. Bowel sounds are normal.      Palpations: Abdomen is soft.   Musculoskeletal:      Cervical back: Normal range of motion.   Skin:     General: Skin is warm and dry.   Neurological:      General: No focal deficit present.      Mental Status: She is alert and oriented to person, place, and time.   Psychiatric:         Mood and Affect: Mood normal.         Behavior: Behavior normal.         Thought Content: Thought content normal.         Judgment: Judgment normal.       Health Maintenance         Date Due Completion Date    RSV Vaccine (Age 60+ and Pregnant patients) (1 - 1-dose 75+ series) Never done ---    Shingles Vaccine (2 of 3) 09/21/2015 7/27/2015    Influenza Vaccine (1) 09/01/2024 10/26/2022    COVID-19 Vaccine (4 - 2024-25 season) 09/01/2024 10/26/2022    Hemoglobin A1c 04/21/2025 10/21/2024    Aspirin/Antiplatelet Therapy 01/03/2026 1/3/2025    TETANUS VACCINE 10/16/2034 10/16/2024              ASSESSMENT     89 y.o. female with     1. Dizziness and giddiness    2. Intention tremor    3. Hypertension, essential, benign    4. Type 2 diabetes mellitus with diabetic peripheral angiopathy without gangrene, without long-term current use of insulin    5. Type 2 diabetes mellitus with stage 3b chronic kidney disease, without long-term current use of insulin        PLAN:     1. Dizziness and giddiness  - MRI Brain Without Contrast; Future    2. Intention tremor  - Continue current non-pharmacologic strategies    3. Hypertension, essential, " benign  - Uncontrolled, continue with enalapril 20 mg BID  - Patient to follow up with cardiology    4. Type 2 diabetes mellitus with diabetic peripheral angiopathy without gangrene, without long-term current use of insulin  - glimepiride (AMARYL) 1 MG tablet; Take 1 tablet (1 mg total) by mouth before breakfast.  Dispense: 90 tablet; Refill: 0    5. Type 2 diabetes mellitus with stage 3b chronic kidney disease, without long-term current use of insulin  - flash glucose sensor (FREESTYLE COLT 14 DAY SENSOR) Kit; 1 kit by Misc.(Non-Drug; Combo Route) route Daily.  Dispense: 1 kit; Refill: 5  - flash glucose scanning reader (FREESTYLE COLT 14 DAY READER) Misc; 1 Box by Misc.(Non-Drug; Combo Route) route Daily.  Dispense: 1 each; Refill: 5        RTC in 2 weeks for results of MRI     Oswald Renata, MS4  01/28/2025 10:46 AM        No follow-ups on file.

## 2025-02-01 ENCOUNTER — HOSPITAL ENCOUNTER (OUTPATIENT)
Dept: RADIOLOGY | Facility: HOSPITAL | Age: OVER 89
Discharge: HOME | End: 2025-02-01
Attending: INTERNAL MEDICINE
Payer: MEDICARE

## 2025-02-01 DIAGNOSIS — R42 DIZZINESS AND GIDDINESS: ICD-10-CM

## 2025-02-01 PROCEDURE — 70551 MRI BRAIN STEM W/O DYE: CPT | Mod: TC,HCNC

## 2025-02-01 PROCEDURE — 70551 MRI BRAIN STEM W/O DYE: CPT | Mod: 26,HCNC,, | Performed by: RADIOLOGY

## 2025-02-04 ENCOUNTER — TELEPHONE (OUTPATIENT)
Dept: FAMILY MEDICINE | Facility: CLINIC | Age: OVER 89
End: 2025-02-04
Payer: MEDICARE

## 2025-02-04 DIAGNOSIS — E11.22 TYPE 2 DIABETES MELLITUS WITH STAGE 3B CHRONIC KIDNEY DISEASE, WITHOUT LONG-TERM CURRENT USE OF INSULIN: ICD-10-CM

## 2025-02-04 DIAGNOSIS — N18.32 TYPE 2 DIABETES MELLITUS WITH STAGE 3B CHRONIC KIDNEY DISEASE, WITHOUT LONG-TERM CURRENT USE OF INSULIN: ICD-10-CM

## 2025-02-04 NOTE — TELEPHONE ENCOUNTER
----- Message from Marcin sent at 2/4/2025  2:56 PM CST -----  Regarding: Elba with [facility]  Name of Caller: Elba     Pharmacy Name: [facility]     Prescription Name: flash glucose sensor (FREESTYLE COLT 14 DAY SENSOR) Kit    What do they need to clarify? stated that this one has been discontinued by the  and they would like to know if they can change it to the 2 or the 3. Please reach back out to them.     Can you be contacted via [facility]? No     Best Call Back Number: .  [facility] - [place], [place] - [address]  [address] [address]  Phone: [phone] Fax: [phone]    Additional Information

## 2025-02-04 NOTE — TELEPHONE ENCOUNTER
Spoke with the pharmacy [facility] who needed a verbal to change the Randal to either 2 or 3. Notified to change to Randal 3 as long as the insurance will cover this kit.   Spoke with the pharmacist to provided another verbal

## 2025-02-22 DIAGNOSIS — Z00.00 ENCOUNTER FOR MEDICARE ANNUAL WELLNESS EXAM: ICD-10-CM

## 2025-03-25 ENCOUNTER — TELEPHONE (OUTPATIENT)
Dept: ENDOCRINOLOGY | Facility: CLINIC | Age: OVER 89
End: 2025-03-25
Payer: MEDICARE

## 2025-03-25 NOTE — TELEPHONE ENCOUNTER
----- Message from Med Assistant Leonard sent at 3/25/2025 10:34 AM CDT -----  Who called:Pt What is the request in detail:Discuss instructions on using [employer] COLT Can the clinic reply by [facility]? NoNo Would the patient rather a call back or a response via [facility]? Call backCallback Best call back number: [phone] Information:Thank you.

## 2025-03-27 ENCOUNTER — TELEPHONE (OUTPATIENT)
Dept: FAMILY MEDICINE | Facility: CLINIC | Age: OVER 89
End: 2025-03-27
Payer: MEDICARE

## 2025-03-27 DIAGNOSIS — R32 URINARY INCONTINENCE, UNSPECIFIED TYPE: Primary | ICD-10-CM

## 2025-03-27 NOTE — TELEPHONE ENCOUNTER
Ninfa Mathias ([facility])  Caller: [phone] (Today,  1:31 PM)  Caller is requesting an earlier appointment then we can schedule.  Caller is requesting a message be sent to the provider.    If this is for urgent care symptoms, did you offer other providers at this location, providers at other locations, or [facility]? (yes, no, n/a):  no    If this is for the patients physical, did you offer to schedule next available and put on wait list, or to see NP or PA for their physical?  (yes, no, n/a):  no    When is the next available appointment with their provider:  April 30th    Reason for the appointment:  pt took a fall and was admitted to [facility], pt would now like to fu with provider and needs a referral to see urologist    Patient preference of timeframe to be scheduled:      Would the patient like a call back, or a response through their [facility] portal?:   call back    Patient would like to get a referral.  Referral to what specialty:  Urology ([facility])  Does the patient want the referral with a specific physician:   phone: [phone]  Is the specialist an [facility] or [facility]:    Reason (be specific):    Does the patient already have the specialty clinic appointment scheduled:    If yes, what date is the appointment scheduled:    Is the insurance listed in Epic correct? (this is important for a referral):  yes  Advised patient that once provider approves this either a nurse or  will return their call?:  Would the patient like a call back, or a response through their [facility] portal?:   Call back

## 2025-03-27 NOTE — TELEPHONE ENCOUNTER
Call returned to patient, she declined needing to schedule an appointment at this time. She says she is is need of a referral to Urology/Dr. Dexter with [facility] for urinary incontinence please.    Advised I will forward referral request to Dr. Pena for review.    Clear bilaterally, pupils equal, round and reactive to light.

## 2025-03-28 NOTE — TELEPHONE ENCOUNTER
Attempted to contact patient with message below, no answer. Detailed message left notifying patient of referral placed. Referral faxed to Dr. Dexter's office as requested.

## 2025-03-28 NOTE — TELEPHONE ENCOUNTER
Urology referral placed.  If the patient is reporting her same fall from several months ago, another appointment isn't needed.  Otherwise, if she has had another fall since her last visit with me feel free to override the a schedule and get her in for a sooner appointment.  Please note the patient does not need an appointment just to have a urology referral placed as the order has been signed.

## 2025-04-16 ENCOUNTER — HOSPITAL ENCOUNTER (INPATIENT)
Facility: HOSPITAL | Age: OVER 89
LOS: 2 days | Discharge: HOME | DRG: 229 | End: 2025-04-18
Attending: EMERGENCY MEDICINE | Admitting: INTERNAL MEDICINE
Payer: MEDICARE

## 2025-04-16 DIAGNOSIS — I44.2 CHB (COMPLETE HEART BLOCK): ICD-10-CM

## 2025-04-16 DIAGNOSIS — R00.1 BRADYCARDIA: ICD-10-CM

## 2025-04-16 DIAGNOSIS — R07.9 CHEST PAIN: ICD-10-CM

## 2025-04-16 DIAGNOSIS — I45.9 HEART BLOCK: Primary | ICD-10-CM

## 2025-04-16 DIAGNOSIS — M25.512 SHOULDER PAIN, LEFT: ICD-10-CM

## 2025-04-16 LAB
ABSOLUTE EOSINOPHIL (OHS): 0.13 K/UL
ABSOLUTE MONOCYTE (OHS): 0.61 K/UL (ref 0.3–1)
ABSOLUTE NEUTROPHIL COUNT (OHS): 5.13 K/UL (ref 1.8–7.7)
ALBUMIN SERPL BCP-MCNC: 3.6 G/DL (ref 3.5–5.2)
ALP SERPL-CCNC: 83 UNIT/L (ref 40–150)
ALT SERPL W/O P-5'-P-CCNC: 15 UNIT/L (ref 10–44)
ANION GAP (OHS): 10 MMOL/L (ref 8–16)
AST SERPL-CCNC: 16 UNIT/L (ref 11–45)
BASOPHILS # BLD AUTO: 0.02 K/UL
BASOPHILS NFR BLD AUTO: 0.3 %
BILIRUB SERPL-MCNC: 0.3 MG/DL (ref 0.1–1)
BUN SERPL-MCNC: 29 MG/DL (ref 10–30)
CALCIUM SERPL-MCNC: 9.7 MG/DL (ref 8.7–10.5)
CHLORIDE SERPL-SCNC: 107 MMOL/L (ref 95–110)
CO2 SERPL-SCNC: 19 MMOL/L (ref 23–29)
CREAT SERPL-MCNC: 1.2 MG/DL (ref 0.5–1.4)
EAG (OHS): 143 MG/DL (ref 68–131)
ERYTHROCYTE [DISTWIDTH] IN BLOOD BY AUTOMATED COUNT: 13.1 % (ref 11.5–14.5)
GFR SERPLBLD CREATININE-BSD FMLA CKD-EPI: 42 ML/MIN/1.73/M2
GLUCOSE SERPL-MCNC: 116 MG/DL (ref 70–110)
HBA1C MFR BLD: 6.6 % (ref 4–5.6)
HCT VFR BLD AUTO: 34 % (ref 37–48.5)
HGB BLD-MCNC: 11.2 GM/DL (ref 12–16)
IMM GRANULOCYTES # BLD AUTO: 0.03 K/UL (ref 0–0.04)
IMM GRANULOCYTES NFR BLD AUTO: 0.4 % (ref 0–0.5)
LACTATE SERPL-SCNC: 1 MMOL/L (ref 0.5–2.2)
LYMPHOCYTES # BLD AUTO: 1.64 K/UL (ref 1–4.8)
MCH RBC QN AUTO: 28.4 PG (ref 27–31)
MCHC RBC AUTO-ENTMCNC: 32.9 G/DL (ref 32–36)
MCV RBC AUTO: 86 FL (ref 82–98)
NUCLEATED RBC (/100WBC) (OHS): 0 /100 WBC
OHS QRS DURATION: 132 MS
OHS QRS DURATION: 136 MS
OHS QTC CALCULATION: 411 MS
OHS QTC CALCULATION: 456 MS
PLATELET # BLD AUTO: 199 K/UL (ref 150–450)
PMV BLD AUTO: 11.6 FL (ref 9.2–12.9)
POCT GLUCOSE: 122 MG/DL (ref 70–110)
POTASSIUM SERPL-SCNC: 4.2 MMOL/L (ref 3.5–5.1)
PROT SERPL-MCNC: 6.2 GM/DL (ref 6–8.4)
RBC # BLD AUTO: 3.95 M/UL (ref 4–5.4)
RELATIVE EOSINOPHIL (OHS): 1.7 %
RELATIVE LYMPHOCYTE (OHS): 21.7 % (ref 18–48)
RELATIVE MONOCYTE (OHS): 8.1 % (ref 4–15)
RELATIVE NEUTROPHIL (OHS): 67.8 % (ref 38–73)
SODIUM SERPL-SCNC: 136 MMOL/L (ref 136–145)
TROPONIN I SERPL HS-MCNC: 11 NG/L
WBC # BLD AUTO: 7.56 K/UL (ref 3.9–12.7)

## 2025-04-16 PROCEDURE — 83605 ASSAY OF LACTIC ACID: CPT | Mod: HCNC

## 2025-04-16 PROCEDURE — 93005 ELECTROCARDIOGRAM TRACING: CPT

## 2025-04-16 PROCEDURE — 80053 COMPREHEN METABOLIC PANEL: CPT | Mod: HCNC

## 2025-04-16 PROCEDURE — 99285 EMERGENCY DEPT VISIT HI MDM: CPT | Mod: 25

## 2025-04-16 PROCEDURE — 63600175 PHARM REV CODE 636 W HCPCS: Mod: HCNC

## 2025-04-16 PROCEDURE — 85025 COMPLETE CBC W/AUTO DIFF WBC: CPT | Mod: HCNC

## 2025-04-16 PROCEDURE — 93005 ELECTROCARDIOGRAM TRACING: CPT | Mod: HCNC

## 2025-04-16 PROCEDURE — 93010 ELECTROCARDIOGRAM REPORT: CPT | Mod: HCNC,,, | Performed by: INTERNAL MEDICINE

## 2025-04-16 PROCEDURE — 99222 1ST HOSP IP/OBS MODERATE 55: CPT | Mod: HCNC,,, | Performed by: INTERNAL MEDICINE

## 2025-04-16 PROCEDURE — 25000003 PHARM REV CODE 250: Mod: HCNC

## 2025-04-16 PROCEDURE — 25000003 PHARM REV CODE 250: Mod: HCNC | Performed by: STUDENT IN AN ORGANIZED HEALTH CARE EDUCATION/TRAINING PROGRAM

## 2025-04-16 PROCEDURE — 63600175 PHARM REV CODE 636 W HCPCS: Mod: HCNC | Performed by: STUDENT IN AN ORGANIZED HEALTH CARE EDUCATION/TRAINING PROGRAM

## 2025-04-16 PROCEDURE — 83036 HEMOGLOBIN GLYCOSYLATED A1C: CPT | Mod: HCNC | Performed by: EMERGENCY MEDICINE

## 2025-04-16 PROCEDURE — 93010 ELECTROCARDIOGRAM REPORT: CPT | Mod: HCNC,76,, | Performed by: INTERNAL MEDICINE

## 2025-04-16 PROCEDURE — 20000000 HC ICU ROOM: Mod: HCNC

## 2025-04-16 PROCEDURE — 84484 ASSAY OF TROPONIN QUANT: CPT | Mod: HCNC

## 2025-04-16 PROCEDURE — 5A1223Z PERFORMANCE OF CARDIAC PACING, CONTINUOUS: ICD-10-PCS | Performed by: INTERNAL MEDICINE

## 2025-04-16 RX ORDER — FAMOTIDINE 20 MG/1
20 TABLET, FILM COATED ORAL 2 TIMES DAILY
Status: DISCONTINUED | OUTPATIENT
Start: 2025-04-16 | End: 2025-04-17

## 2025-04-16 RX ORDER — HYDROCODONE BITARTRATE AND ACETAMINOPHEN 5; 325 MG/1; MG/1
1 TABLET ORAL EVERY 4 HOURS PRN
Refills: 0 | Status: DISCONTINUED | OUTPATIENT
Start: 2025-04-16 | End: 2025-04-18 | Stop reason: HOSPADM

## 2025-04-16 RX ORDER — IBUPROFEN 200 MG
16 TABLET ORAL
Status: DISCONTINUED | OUTPATIENT
Start: 2025-04-16 | End: 2025-04-18 | Stop reason: HOSPADM

## 2025-04-16 RX ORDER — GLUCAGON 1 MG
1 KIT INJECTION
Status: DISCONTINUED | OUTPATIENT
Start: 2025-04-16 | End: 2025-04-18 | Stop reason: HOSPADM

## 2025-04-16 RX ORDER — HYDRALAZINE HYDROCHLORIDE 20 MG/ML
10 INJECTION INTRAMUSCULAR; INTRAVENOUS
Status: COMPLETED | OUTPATIENT
Start: 2025-04-16 | End: 2025-04-16

## 2025-04-16 RX ORDER — ASPIRIN 81 MG/1
81 TABLET ORAL DAILY
Status: DISCONTINUED | OUTPATIENT
Start: 2025-04-17 | End: 2025-04-18 | Stop reason: HOSPADM

## 2025-04-16 RX ORDER — IBUPROFEN 200 MG
24 TABLET ORAL
Status: DISCONTINUED | OUTPATIENT
Start: 2025-04-16 | End: 2025-04-18 | Stop reason: HOSPADM

## 2025-04-16 RX ORDER — ATORVASTATIN CALCIUM 40 MG/1
40 TABLET, FILM COATED ORAL DAILY
Status: DISCONTINUED | OUTPATIENT
Start: 2025-04-17 | End: 2025-04-18 | Stop reason: HOSPADM

## 2025-04-16 RX ORDER — SODIUM CHLORIDE 0.9 % (FLUSH) 0.9 %
10 SYRINGE (ML) INJECTION
Status: DISCONTINUED | OUTPATIENT
Start: 2025-04-16 | End: 2025-04-18 | Stop reason: HOSPADM

## 2025-04-16 RX ORDER — OXYCODONE HYDROCHLORIDE 5 MG/1
5 TABLET ORAL
Refills: 0 | Status: COMPLETED | OUTPATIENT
Start: 2025-04-16 | End: 2025-04-16

## 2025-04-16 RX ORDER — LISINOPRIL 20 MG/1
20 TABLET ORAL DAILY
Status: DISCONTINUED | OUTPATIENT
Start: 2025-04-16 | End: 2025-04-18

## 2025-04-16 RX ORDER — ACETAMINOPHEN 325 MG/1
650 TABLET ORAL EVERY 4 HOURS PRN
Status: DISCONTINUED | OUTPATIENT
Start: 2025-04-16 | End: 2025-04-18 | Stop reason: HOSPADM

## 2025-04-16 RX ORDER — LIDOCAINE HYDROCHLORIDE 10 MG/ML
10 INJECTION, SOLUTION EPIDURAL; INFILTRATION; INTRACAUDAL; PERINEURAL
Status: COMPLETED | OUTPATIENT
Start: 2025-04-16 | End: 2025-04-16

## 2025-04-16 RX ORDER — INSULIN ASPART 100 [IU]/ML
0-5 INJECTION, SOLUTION INTRAVENOUS; SUBCUTANEOUS
Status: DISCONTINUED | OUTPATIENT
Start: 2025-04-16 | End: 2025-04-18 | Stop reason: HOSPADM

## 2025-04-16 RX ORDER — HYDROMORPHONE HYDROCHLORIDE 2 MG/ML
0.2 INJECTION, SOLUTION INTRAMUSCULAR; INTRAVENOUS; SUBCUTANEOUS ONCE
Status: COMPLETED | OUTPATIENT
Start: 2025-04-16 | End: 2025-04-16

## 2025-04-16 RX ADMIN — LIDOCAINE HYDROCHLORIDE 100 MG: 10 SOLUTION INTRAVENOUS at 05:04

## 2025-04-16 RX ADMIN — HYDROMORPHONE HYDROCHLORIDE 0.2 MG: 2 INJECTION, SOLUTION INTRAMUSCULAR; INTRAVENOUS; SUBCUTANEOUS at 11:04

## 2025-04-16 RX ADMIN — OXYCODONE 5 MG: 5 TABLET ORAL at 03:04

## 2025-04-16 RX ADMIN — LISINOPRIL 20 MG: 20 TABLET ORAL at 10:04

## 2025-04-16 RX ADMIN — HYDRALAZINE HYDROCHLORIDE 10 MG: 20 INJECTION, SOLUTION INTRAMUSCULAR; INTRAVENOUS at 09:04

## 2025-04-16 RX ADMIN — FAMOTIDINE 20 MG: 20 TABLET, FILM COATED ORAL at 10:04

## 2025-04-16 RX ADMIN — HYDROCODONE BITARTRATE AND ACETAMINOPHEN 1 TABLET: 5; 325 TABLET ORAL at 10:04

## 2025-04-16 NOTE — CARE UPDATE
Ms. Marquez is a pleasant 89 y/o W with below history as per outpatient cards:       Coronary artery disease of native artery of native heart with stable angina pectoris (    2. History of coronary artery stent placement     3. Ischemic cardiomyopathy     4. Left bundle branch block     5. Shortness of breath     6. Closed head injury, subsequent encounter     7. Closed fracture of right side of maxilla with routine healing   8. Dizziness       Presented to the ED with left shoulder pain. While awaiting work up rhythm change noted on telemetry with ECG showing rates in the 38 with complete heart block. ECG on arrival SR with old LBBB as per outpatient cardiologist note as we do not have prior ECGs. Patient hypertensive and asymptomatic. However admits recent decreased memory after a fall with closed head injury, facial fracture, and thumb fracture 10/16/2024. Per notes questionable syncope. Per Dr. KEENAN Byers patient had denied any syncopal event with the fall. Per note she did not seem to have orthostatic type symptoms and her dizziness was likely related to her closed head injury.   She is AAOx3 and fully independent. Retired cardiac nurse. Would like to go back to [place] from she is from. No family present and no POA.         Discussed with patient regarding code status. Reviewed ECG with EP fellow who recs admission to CCU, TVP placement and most likely PPM. She is chronically on toprol succinate 50mg. Last taken on 4/15/2025 at about 3 pm. Discussed with patient and she would like to think about it as she understands she is 89. Patient wanted opinion of outpatient cardiologist. Unfortunately, I discussed with her that it would not be safe for her to to go home and that we would not recommend discharge with current rhythm.     Discussed with ED team as well. If patient decides to proceed with all therapies. She will require CCU admission, TVP placement and for EP to continue to follow.       Formal note to  follow.     Daniela Mariee MD  Cardiology fellow.

## 2025-04-16 NOTE — ED PROVIDER NOTES
Source of History:  Patient and chart review    Chief complaint:  Chest Pain (Experiencing chest pain, SOB, and L shoulder pain for a couple days. )    HPI:  Tonya Marquez is a 89 y.o. female with a past medical history of HTN, HLD, T2DM, and prior MI s/p coronary stent on Plavix (08/2017) who presents to the emergency department with a chief complaint of 3-day history of sharp left-sided shoulder pain. Reports pain is worse with movement of her shoulder, especially rasing above 90 degrees and external rotation. Patient reports being at rest when she begin noticing pain in her shoulder. Denies recent trauma/falls, associated chest pain, shortness of breath, nausea/vomiting, changes in sensation, or similar prior episodes.    Patient reports recently being seen by cardiologist secondary to persistent hypertension (sBP >180). Per independent chart review, patient experienced a ground level fall with closed head injury and facial fracture in October of 2024 with unsteadiness and reported dizziness since.  Patient insistent that fall was not secondary to syncope and unrelated to her dizziness.      Review of patient's allergies indicates:   Allergen Reactions    Amoxicillin     Bactrim [sulfamethoxazole-trimethoprim]        Medications Ordered Prior to Encounter[1]    PMH:  As per HPI and below:  Past Medical History:   Diagnosis Date    Coronary artery disease     Diabetes mellitus type I     Hyperlipidemia     Hypertension      Past Surgical History:   Procedure Laterality Date    ADENOIDECTOMY      CATARACT EXTRACTION, BILATERAL      CORONARY STENT PLACEMENT  08/21/2016    dr. ruiz       Social History     Socioeconomic History    Marital status:    Tobacco Use    Smoking status: Never    Smokeless tobacco: Never   Substance and Sexual Activity    Alcohol use: Not Currently    Drug use: Never    Sexual activity: Never     Social Drivers of Health     Financial Resource Strain: Low Risk  (7/14/2023)     Overall Financial Resource Strain (CARDIA)     Difficulty of Paying Living Expenses: Not hard at all   Food Insecurity: No Food Insecurity (10/22/2024)    Received from [facility]    Hunger Vital Sign     Worried About Running Out of Food in the Last Year: Never true     Ran Out of Food in the Last Year: Never true   Transportation Needs: No Transportation Needs (10/22/2024)    Received from [facility]    PRAPARE - Transportation     Lack of Transportation (Medical): No     Lack of Transportation (Non-Medical): No   Physical Activity: Insufficiently Active (7/14/2023)    Exercise Vital Sign     Days of Exercise per Week: 2 days     Minutes of Exercise per Session: 60 min   Stress: No Stress Concern Present (7/14/2023)    [employer] - Occupational Stress Questionnaire     Feeling of Stress : Only a little   Housing Stability: Low Risk  (10/22/2024)    Received from [facility]    Housing Stability Vital Sign     Unable to Pay for Housing in the Last Year: No     Number of Times Moved in the Last Year: 1     Homeless in the Last Year: No       No family history on file.    Physical Exam:      Vitals:    04/16/25 2245   BP:    Pulse:    Resp: (!) 23   Temp:      Physical Exam    Gen: No acute distress  Mental Status: Alert and answering questions appropriately  Skin: Warm, dry. No rashes seen  Eyes: No conjunctival injection  Pulm: No increased work of breathing, significant tachypnea, or conversational dyspnea    CV: Regular bradycardic, no m/r/g  Abd: Soft, non distended, non tender to palpation. No guarding or rebound  MSK: No obvious deformities. WWP. 2+ palpable radial pulse bilaterally. Sensation and motor intact. Significant pain with active and passive ROM.  Neuro: Awake. Speech normal. No focal neuro deficit observed    Transvenous Pacing    Date/Time: 4/16/2025 8:49 PM    Performed by: Shawn Reid MD  Authorized by: Duke Houston MD    Supervisor Name:  D[facility]  Location procedure was performed:  [facility] EMERGENCY DEPARTMENT  Pre-operative diagnosis:  3rd degree heart block  Consent Done ?:  Yes  Time out complete?: Verified correct patient, procedure, equipment, staff, and site/side    Indications:  Hemodynamic monitoring (3rd degree heart block)  Anesthesia:  Local infiltration  Local anesthetic:  Lidocaine 1% without epinephrine  Preparation:  Skin prepped with ChloraPrep  Skin prep agent dried: Skin prep agent completely dried prior to procedure    Sterile barriers: All five maximal sterile barriers used - gloves, gown, cap, mask and large sterile sheet    Hand hygiene: Hand hygiene performed immediately prior to central venous catheter insertion    Location:  Right internal jugular  Catheter type:  Single lumen  Catheter size:  6 Fr (4-6 Fr)  Ultrasound guidance: Yes    Needle advanced into vessel with real time ultrasound guidance.    Guidewire confirmed in vessel.    Steril sheath on probe.    Sterile gel used.  Manometry: No    Number of attempts:  1  Securement:  Line sutured, chlorhexidine patch, blood return through all ports and sterile dressing applied  Complications: No    Estimated blood loss (mL):  5  Specimens: No    Implants: No    Guidewire: guidewire removed intact    XRay:  Successful placement, no pneumothorax on x-ray and placement verified by x-ray  Adverse Events:  None    Laboratory Studies:  Labs Reviewed   COMPREHENSIVE METABOLIC PANEL - Abnormal       Result Value    Sodium 136      Potassium 4.2      Chloride 107      CO2 19 (*)     Glucose 116 (*)     BUN 29      Creatinine 1.2      Calcium 9.7      Protein Total 6.2      Albumin 3.6      Bilirubin Total 0.3      ALP 83      AST 16      ALT 15      Anion Gap 10      eGFR 42 (*)    CBC WITH DIFFERENTIAL - Abnormal    WBC 7.56      RBC 3.95 (*)     HGB 11.2 (*)     HCT 34.0 (*)     MCV 86      MCH 28.4      MCHC 32.9      RDW 13.1      Platelet Count 199      MPV 11.6      Nucleated RBC 0       Neut % 67.8      Lymph % 21.7      Mono % 8.1      Eos % 1.7      Basophil % 0.3      Imm Grans % 0.4      Neut # 5.13      Lymph # 1.64      Mono # 0.61      Eos # 0.13      Baso # 0.02      Imm Grans # 0.03     POCT GLUCOSE - Abnormal    POCT Glucose 122 (*)    TROPONIN I HIGH SENSITIVITY - Normal    Troponin High Sensitive 11     LACTIC ACID, PLASMA - Normal    Lactic Acid Level 1.0      Narrative:     Falsely low lactic acid results can be found in samples containing >=13.0 mg/dL total bilirubin and/or >=3.5 mg/dL direct bilirubin.    CBC W/ AUTO DIFFERENTIAL    Narrative:     The following orders were created for panel order CBC auto differential.                  Procedure                               Abnormality         Status                                     ---------                               -----------         ------                                     CBC with Differential[[id number]]       Abnormal            Final result                                                 Please view results for these tests on the individual orders.   HEP C VIRUS HOLD SPECIMEN   URINALYSIS, REFLEX TO URINE CULTURE   POCT GLUCOSE MONITORING CONTINUOUS       Imaging Results              X-Ray Shoulder Complete 2 View Right (Final result)  Result time 04/16/25 15:54:25      Final result by Hermilo Roque MD (04/16/25 15:54:25)                   Impression:      No acute displaced fracture-dislocation identified.      Electronically signed by: Hermilo Roque MD  Date:    04/16/2025  Time:    15:54               Narrative:    EXAMINATION:  XR SHOULDER COMPLETE 2 OR MORE VIEWS RIGHT    CLINICAL HISTORY:  Pain in left shoulder    TECHNIQUE:  Two or three views of the right shoulder were performed.    COMPARISON:  Chest radiograph 10/03/2019    FINDINGS:  Overall alignment is within normal limits. No displaced fracture, dislocation or destructive osseous process.  Minimal degenerative change about the shoulder.  No  subcutaneous emphysema or radiopaque foreign body.  Left lung apex is clear.  Calcification at the aortic knob.                                      Medications Given:  Medications   sodium chloride 0.9% flush 10 mL (has no administration in time range)   acetaminophen tablet 650 mg (has no administration in time range)   HYDROcodone-acetaminophen 5-325 mg per tablet 1 tablet (1 tablet Oral Given 4/16/25 2245)   famotidine tablet 20 mg (20 mg Oral Given 4/16/25 2236)   aspirin EC tablet 81 mg (has no administration in time range)   atorvastatin tablet 40 mg (has no administration in time range)   lisinopriL tablet 20 mg (20 mg Oral Given 4/16/25 2236)   glucose chewable tablet 16 g (has no administration in time range)   glucose chewable tablet 24 g (has no administration in time range)   dextrose 50% injection 12.5 g (has no administration in time range)   dextrose 50% injection 25 g (has no administration in time range)   glucagon (human recombinant) injection 1 mg (has no administration in time range)   insulin aspart U-100 pen 0-5 Units (has no administration in time range)   HYDROmorphone (PF) injection 0.2 mg (has no administration in time range)   oxyCODONE immediate release tablet 5 mg (5 mg Oral Given 4/16/25 1533)   LIDOcaine (PF) 10 mg/ml (1%) injection 100 mg (100 mg Infiltration Given by Provider 4/16/25 1745)   hydrALAZINE injection 10 mg (10 mg Intravenous Given 4/16/25 2153)       ED Course as of 04/16/25 2312   Wed Apr 16, 2025   1427 EKG individually interpreted by me shows normal sinus rhythm with a rate of 62.  Left axis deviation.  Left bundle-branch block.  No STEMI. [BD]   1455 89 yo F pmhx of CAD sp stent, HTN, HLD, DM2 pw left shoulder pain. AF. Hypertensive otherwise reassuring VS.  [BD]   1555 Repeat EKG concerning for complete heart block with a rate of 37.  Will discuss with cardiology fellow on-call [BD]   1714 Troponin I High Sensitivity: 11 [AC]   1743 On repeat exam patient did have  a recent fall and has been dizzy treating this as symptomatic complete heart block.  Discussed with cardiology fellow on-call who agrees with plan for transvenous pacer with ultimate conversion to permanent pacemaker.  Patient is in agreement with plan to proceed with TVP.  And patient will be admitted to cardiac ICU. [BD]   2044 Transvenous pacemaker placed in right IJ in conjunction with cardiology fellow and attending at bedside.  TVP with good capture and now at 10 mAs at a rate of 50.  Will admit to cardiac ICU [BD]      ED Course User Index  [AC] Shawn Reid MD  [BD] Duke Houston MD       Discussed with attending physician Dr. Houston    MDM:    Tonya Marquez is a 89 y.o. female with above medical history who presents with left-sided shoulder pain.   Patient is afebrile, hemodynamically stable, and in no acute distress on arrival. Exam significant for benign pulmonary and respiratory exam. Cardiac exam demonstrating sinus bradycardia with no m/r/g.      Differential diagnoses considered include, but not limited to: Rotator cuff pathology, tendinitis, ACS, DVT    EKG per my interpretation demonstrates Normal sinus rhythm, ventricular rate 62 bpm, left axis deviation, LBBB. No STEMI.     Labs reassuring including initial troponin of 11 and lactate of 1.    Upon reassessing the patient, she was noted to have HR in 30's for which a repeat EKG was obtained and demonstrated 3rd degree heart block.  Patient was discussed with Cardiology who recommended transvenous pacing and cardiac ICU admission.  Patient was discussed with cardiac ICU who agreed to admit.  Trans venous pacing was achieved via right internal jugular vein as detailed above without complication.  Patient tolerated the procedure well with resolution of her bradycardia.    Of note, nurse triage note states patient presented with complaints of chest pain and shortness of breath, however, this is inaccurate per patient.      Medical  Decision Making  Amount and/or Complexity of Data Reviewed  Labs: ordered. Decision-making details documented in ED Course.  Radiology: ordered.    Risk  Prescription drug management.  Decision regarding hospitalization.           Diagnostic Impression:    1. 3rd degree heart block    2. Chest pain    3. Shoulder pain, left    4. Bradycardia         ED Disposition Condition    Admit              Patient and/or family understands the plan and is in agreement, verbalized understanding, questions answered    Shawn Reid MD  Resident  Emergency Medicine                                [1]   No current facility-administered medications on file prior to encounter.     Current Outpatient Medications on File Prior to Encounter   Medication Sig Dispense Refill    artificial tear,dxtrn-hpm-gly, (GENTEAL TEARS MODERATE) 0.1-0.3-0.2 % Drop 1 drop.      ascorbic acid, vitamin C, (VITAMIN C) 100 MG tablet Take 500 mg by mouth once daily.      aspirin (ECOTRIN) 81 MG EC tablet Take 81 mg by mouth once daily.      atorvastatin (LIPITOR) 40 MG tablet Take 1 tablet (40 mg total) by mouth once daily. 90 tablet 3    blood sugar diagnostic Strp Check 2x/day 200 each 3    budesonide (PULMICORT) 0.5 mg/2 mL nebulizer solution SMARTSI Milliliter(s) Via Nebulizer Daily      clopidogreL (PLAVIX) 75 mg tablet TAKE 1 TABLET ONE TIME DAILY 90 tablet 0    colchicine (COLCRYS) 0.6 mg tablet Take 2 tablets by mouth, then one hour later take 1 additional tablet 3 tablet 0    enalapril (VASOTEC) 20 MG tablet Take 1 tablet by mouth 2 (two) times daily.      flash glucose scanning reader (FREESTYLE COLT 14 DAY READER) Misc 1 Box by Misc.(Non-Drug; Combo Route) route Daily. 1 each 5    flash glucose sensor (FREESTYLE COLT 14 DAY SENSOR) Kit 1 kit by Misc.(Non-Drug; Combo Route) route Daily. 1 kit 5    fluticasone propionate (FLONASE) 50 mcg/actuation nasal spray 1 spray (50 mcg total) by Each Nostril route 2 (two) times daily. 18.2 mL  3    glimepiride (AMARYL) 1 MG tablet Take 1 tablet (1 mg total) by mouth before breakfast. 90 tablet 0    isosorbide mononitrate (IMDUR) 120 MG 24 hr tablet       levocetirizine (XYZAL) 5 MG tablet Take 1 tablet by mouth every evening.      meclizine (ANTIVERT) 25 mg tablet Take 25 mg by mouth 3 (three) times daily as needed.      metoprolol succinate (TOPROL-XL) 50 MG 24 hr tablet Take 1 tablet by mouth once daily.      metoprolol tartrate (LOPRESSOR) 50 MG tablet Take 1 tablet by mouth.      nitroGLYCERIN (NITROSTAT) 0.3 MG SL tablet Place 1 tablet (0.3 mg total) under the tongue every 5 (five) minutes as needed for Chest pain. 25 tablet 1    PREVIDENT 5000 ENAMEL PROTECT 1.1-5 % Pste       senna (SENOKOT) 8.6 mg tablet Take 8.6 mg by mouth.      senna-docusate 8.6-50 mg (PERICOLACE) 8.6-50 mg per tablet Take 1 tablet by mouth once daily.          Shawn Reid MD  Resident  04/16/25 3970

## 2025-04-16 NOTE — ED TRIAGE NOTES
Tonya Marquez, a 89 y.o. female presents to the ED w/ complaint of left arm pain and dizziness.    Pt stated left arm pain started about 3 days ago and dizziness started about 5 days ago. Pt denies CP.     Pt also reports SOB.     Pt took 200mg of ibuprofen at about 0900 today, without relief.     Triage note:  Chief Complaint   Patient presents with    Chest Pain     Experiencing chest pain, SOB, and L shoulder pain for a couple days.      Review of patient's allergies indicates:   Allergen Reactions    Amoxicillin     Bactrim [sulfamethoxazole-trimethoprim]      Past Medical History:   Diagnosis Date    Coronary artery disease     Diabetes mellitus type I     Hyperlipidemia     Hypertension

## 2025-04-17 ENCOUNTER — ANESTHESIA EVENT (OUTPATIENT)
Dept: MEDSURG UNIT | Facility: HOSPITAL | Age: OVER 89
End: 2025-04-17
Payer: MEDICARE

## 2025-04-17 ENCOUNTER — ANESTHESIA (OUTPATIENT)
Dept: MEDSURG UNIT | Facility: HOSPITAL | Age: OVER 89
End: 2025-04-17
Payer: MEDICARE

## 2025-04-17 PROBLEM — I25.10 CAD (CORONARY ARTERY DISEASE): Status: ACTIVE | Noted: 2025-04-17

## 2025-04-17 PROBLEM — M25.512 LOCALIZED PAIN OF LEFT SHOULDER JOINT: Status: ACTIVE | Noted: 2025-04-17

## 2025-04-17 PROBLEM — I10 PRIMARY HYPERTENSION: Status: ACTIVE | Noted: 2025-04-17

## 2025-04-17 PROBLEM — I16.0 HYPERTENSIVE URGENCY: Status: ACTIVE | Noted: 2025-04-17

## 2025-04-17 PROBLEM — I44.2 CHB (COMPLETE HEART BLOCK): Status: ACTIVE | Noted: 2025-04-17

## 2025-04-17 LAB
ASCENDING AORTA: 2.47 CM
AV AREA BY CONTINUOUS VTI: 1.7 CM2
AV INDEX (PROSTH): 0.53
AV LVOT MEAN GRADIENT: 2 MMHG
AV LVOT PEAK GRADIENT: 4 MMHG
AV MEAN GRADIENT: 11 MMHG
AV PEAK GRADIENT: 19 MMHG
AV VALVE AREA BY VELOCITY RATIO: 1.3 CM²
AV VALVE AREA: 1.7 CM2
AV VELOCITY RATIO: 0.41
BSA FOR ECHO PROCEDURE: 1.62 M2
CV ECHO LV RWT: 0.39 CM
DOP CALC AO PEAK VEL: 2.2 M/S
DOP CALC AO VTI: 41.5 CM
DOP CALC LVOT AREA: 3.1 CM2
DOP CALC LVOT DIAMETER: 2 CM
DOP CALC LVOT PEAK VEL: 0.9 M/S
DOP CALC LVOT STROKE VOLUME: 68.8 CM3
DOP CALCLVOT PEAK VEL VTI: 21.9 CM
E WAVE DECELERATION TIME: 373 MS
E/A RATIO: 0.63
E/E' RATIO: 13 M/S
ECHO EF ESTIMATED: 66 %
ECHO LV POSTERIOR WALL: 0.8 CM (ref 0.6–1.1)
FRACTIONAL SHORTENING: 34.1 % (ref 28–44)
INTERVENTRICULAR SEPTUM: 0.9 CM (ref 0.6–1.1)
LA MAJOR: 6 CM
LA MINOR: 6.3 CM
LA WIDTH: 4.1 CM
LEFT ATRIUM SIZE: 3.5 CM
LEFT ATRIUM VOLUME INDEX MOD: 44 ML/M2
LEFT ATRIUM VOLUME INDEX: 46 ML/M2
LEFT ATRIUM VOLUME MOD: 71 ML
LEFT ATRIUM VOLUME: 75 CM3
LEFT INTERNAL DIMENSION IN SYSTOLE: 2.7 CM (ref 2.1–4)
LEFT VENTRICLE DIASTOLIC VOLUME INDEX: 46.3 ML/M2
LEFT VENTRICLE DIASTOLIC VOLUME: 75 ML
LEFT VENTRICLE MASS INDEX: 65.2 G/M2
LEFT VENTRICLE SYSTOLIC VOLUME INDEX: 16 ML/M2
LEFT VENTRICLE SYSTOLIC VOLUME: 26 ML
LEFT VENTRICULAR INTERNAL DIMENSION IN DIASTOLE: 4.1 CM (ref 3.5–6)
LEFT VENTRICULAR MASS: 105.6 G
LV LATERAL E/E' RATIO: 12.3
LV SEPTAL E/E' RATIO: 14.3
MV MEAN GRADIENT: 3 MMHG
MV PEAK A VEL: 1.37 M/S
MV PEAK E VEL: 0.86 M/S
MV PEAK GRADIENT: 10 MMHG
OHS CV RV/LV RATIO: 0.85 CM
OHS QRS DURATION: 134 MS
OHS QTC CALCULATION: 420 MS
PISA TR MAX VEL: 3.3 M/S
POCT GLUCOSE: 127 MG/DL (ref 70–110)
POCT GLUCOSE: 151 MG/DL (ref 70–110)
POCT GLUCOSE: 235 MG/DL (ref 70–110)
RA MAJOR: 5.17 CM
RA PRESSURE ESTIMATED: 3 MMHG
RA WIDTH: 2.88 CM
RIGHT VENTRICLE DIASTOLIC BASEL DIMENSION: 3.5 CM
RV TB RVSP: 6 MMHG
SINUS: 2.83 CM
STJ: 2.4 CM
TDI LATERAL: 0.07 M/S
TDI SEPTAL: 0.06 M/S
TDI: 0.07 M/S
TRICUSPID ANNULAR PLANE SYSTOLIC EXCURSION: 1.14 CM
TV PEAK GRADIENT: 44 MMHG
TV REST PULMONARY ARTERY PRESSURE: 47 MMHG
Z-SCORE OF LEFT VENTRICULAR DIMENSION IN END DIASTOLE: -1.11
Z-SCORE OF LEFT VENTRICULAR DIMENSION IN END SYSTOLE: -0.4

## 2025-04-17 PROCEDURE — 25000003 PHARM REV CODE 250: Mod: HCNC | Performed by: STUDENT IN AN ORGANIZED HEALTH CARE EDUCATION/TRAINING PROGRAM

## 2025-04-17 PROCEDURE — 94799 UNLISTED PULMONARY SVC/PX: CPT | Mod: HCNC

## 2025-04-17 PROCEDURE — 37000008 HC ANESTHESIA 1ST 15 MINUTES: Mod: HCNC | Performed by: INTERNAL MEDICINE

## 2025-04-17 PROCEDURE — 63600175 PHARM REV CODE 636 W HCPCS: Mod: HCNC | Performed by: STUDENT IN AN ORGANIZED HEALTH CARE EDUCATION/TRAINING PROGRAM

## 2025-04-17 PROCEDURE — 33274 TCAT INSJ/RPL PERM LDLS PM: CPT | Mod: Q0,HCNC,GC, | Performed by: INTERNAL MEDICINE

## 2025-04-17 PROCEDURE — C1894 INTRO/SHEATH, NON-LASER: HCPCS | Mod: HCNC | Performed by: INTERNAL MEDICINE

## 2025-04-17 PROCEDURE — 99291 CRITICAL CARE FIRST HOUR: CPT | Mod: HCNC,,, | Performed by: INTERNAL MEDICINE

## 2025-04-17 PROCEDURE — 02HK3NZ INSERTION OF INTRACARDIAC PACEMAKER INTO RIGHT VENTRICLE, PERCUTANEOUS APPROACH: ICD-10-PCS | Performed by: INTERNAL MEDICINE

## 2025-04-17 PROCEDURE — 20000000 HC ICU ROOM: Mod: HCNC

## 2025-04-17 PROCEDURE — 37000009 HC ANESTHESIA EA ADD 15 MINS: Mod: HCNC | Performed by: INTERNAL MEDICINE

## 2025-04-17 PROCEDURE — C1760 CLOSURE DEV, VASC: HCPCS | Mod: HCNC | Performed by: INTERNAL MEDICINE

## 2025-04-17 PROCEDURE — 33274 TCAT INSJ/RPL PERM LDLS PM: CPT | Mod: Q0,HCNC | Performed by: INTERNAL MEDICINE

## 2025-04-17 PROCEDURE — 25000003 PHARM REV CODE 250: Mod: HCNC | Performed by: INTERNAL MEDICINE

## 2025-04-17 PROCEDURE — C1769 GUIDE WIRE: HCPCS | Mod: HCNC | Performed by: INTERNAL MEDICINE

## 2025-04-17 PROCEDURE — C1786 PMKR, SINGLE, RATE-RESP: HCPCS | Mod: HCNC | Performed by: INTERNAL MEDICINE

## 2025-04-17 PROCEDURE — 63600175 PHARM REV CODE 636 W HCPCS: Mod: HCNC | Performed by: INTERNAL MEDICINE

## 2025-04-17 DEVICE — TPS MC2AVR1 MICRA AV2 US
Type: IMPLANTABLE DEVICE | Site: HEART | Status: FUNCTIONAL
Brand: MICRA™ AV2

## 2025-04-17 RX ORDER — FENTANYL CITRATE 50 UG/ML
INJECTION, SOLUTION INTRAMUSCULAR; INTRAVENOUS
Status: DISCONTINUED | OUTPATIENT
Start: 2025-04-17 | End: 2025-04-17

## 2025-04-17 RX ORDER — NICARDIPINE HYDROCHLORIDE 0.2 MG/ML
0-15 INJECTION INTRAVENOUS CONTINUOUS
Status: DISCONTINUED | OUTPATIENT
Start: 2025-04-17 | End: 2025-04-17

## 2025-04-17 RX ORDER — PHENYLEPHRINE HYDROCHLORIDE 10 MG/ML
INJECTION INTRAVENOUS
Status: DISCONTINUED | OUTPATIENT
Start: 2025-04-17 | End: 2025-04-17

## 2025-04-17 RX ORDER — LIDOCAINE HYDROCHLORIDE 20 MG/ML
INJECTION INTRAVENOUS
Status: DISCONTINUED | OUTPATIENT
Start: 2025-04-17 | End: 2025-04-17

## 2025-04-17 RX ORDER — PROCHLORPERAZINE EDISYLATE 5 MG/ML
5 INJECTION INTRAMUSCULAR; INTRAVENOUS EVERY 30 MIN PRN
Status: CANCELLED | OUTPATIENT
Start: 2025-04-17

## 2025-04-17 RX ORDER — HYDROMORPHONE HYDROCHLORIDE 1 MG/ML
0.2 INJECTION, SOLUTION INTRAMUSCULAR; INTRAVENOUS; SUBCUTANEOUS EVERY 5 MIN PRN
Refills: 0 | Status: CANCELLED | OUTPATIENT
Start: 2025-04-17

## 2025-04-17 RX ORDER — DEXMEDETOMIDINE HYDROCHLORIDE 100 UG/ML
INJECTION, SOLUTION INTRAVENOUS
Status: DISCONTINUED | OUTPATIENT
Start: 2025-04-17 | End: 2025-04-17

## 2025-04-17 RX ORDER — LIDOCAINE HYDROCHLORIDE 20 MG/ML
INJECTION, SOLUTION INFILTRATION; PERINEURAL
Status: DISCONTINUED | OUTPATIENT
Start: 2025-04-17 | End: 2025-04-17

## 2025-04-17 RX ORDER — ISOSORBIDE MONONITRATE 60 MG/1
120 TABLET, EXTENDED RELEASE ORAL DAILY
Status: DISCONTINUED | OUTPATIENT
Start: 2025-04-17 | End: 2025-04-18 | Stop reason: HOSPADM

## 2025-04-17 RX ORDER — PROPOFOL 10 MG/ML
VIAL (ML) INTRAVENOUS CONTINUOUS PRN
Status: DISCONTINUED | OUTPATIENT
Start: 2025-04-17 | End: 2025-04-17

## 2025-04-17 RX ORDER — CEFAZOLIN 2 G/1
2 INJECTION, POWDER, FOR SOLUTION INTRAMUSCULAR; INTRAVENOUS
Status: CANCELLED | OUTPATIENT
Start: 2025-04-17

## 2025-04-17 RX ORDER — AMLODIPINE BESYLATE 10 MG/1
10 TABLET ORAL DAILY
Status: DISCONTINUED | OUTPATIENT
Start: 2025-04-17 | End: 2025-04-18 | Stop reason: HOSPADM

## 2025-04-17 RX ORDER — HEPARIN SODIUM 1000 [USP'U]/ML
INJECTION, SOLUTION INTRAVENOUS; SUBCUTANEOUS
Status: DISCONTINUED | OUTPATIENT
Start: 2025-04-17 | End: 2025-04-17

## 2025-04-17 RX ORDER — ONDANSETRON HYDROCHLORIDE 2 MG/ML
INJECTION, SOLUTION INTRAVENOUS
Status: DISCONTINUED | OUTPATIENT
Start: 2025-04-17 | End: 2025-04-17

## 2025-04-17 RX ORDER — FAMOTIDINE 20 MG/1
20 TABLET, FILM COATED ORAL
Status: DISCONTINUED | OUTPATIENT
Start: 2025-04-18 | End: 2025-04-18 | Stop reason: HOSPADM

## 2025-04-17 RX ORDER — DIPHENHYDRAMINE HYDROCHLORIDE 50 MG/ML
25 INJECTION, SOLUTION INTRAMUSCULAR; INTRAVENOUS EVERY 6 HOURS PRN
Status: CANCELLED | OUTPATIENT
Start: 2025-04-17

## 2025-04-17 RX ORDER — FENTANYL CITRATE 50 UG/ML
25 INJECTION, SOLUTION INTRAMUSCULAR; INTRAVENOUS EVERY 5 MIN PRN
Refills: 0 | Status: CANCELLED | OUTPATIENT
Start: 2025-04-17

## 2025-04-17 RX ORDER — ONDANSETRON HYDROCHLORIDE 2 MG/ML
4 INJECTION, SOLUTION INTRAVENOUS ONCE AS NEEDED
Status: CANCELLED | OUTPATIENT
Start: 2025-04-17 | End: 2036-09-13

## 2025-04-17 RX ORDER — HEPARIN SOD,PORCINE/0.9 % NACL 1000/500ML
INTRAVENOUS SOLUTION INTRAVENOUS
Status: DISCONTINUED | OUTPATIENT
Start: 2025-04-17 | End: 2025-04-17

## 2025-04-17 RX ADMIN — DEXMEDETOMIDINE 8 MCG: 200 INJECTION, SOLUTION INTRAVENOUS at 10:04

## 2025-04-17 RX ADMIN — FENTANYL CITRATE 25 MCG: 50 INJECTION, SOLUTION INTRAMUSCULAR; INTRAVENOUS at 09:04

## 2025-04-17 RX ADMIN — PROPOFOL 75 MCG/KG/MIN: 10 INJECTION, EMULSION INTRAVENOUS at 09:04

## 2025-04-17 RX ADMIN — SODIUM CHLORIDE, SODIUM GLUCONATE, SODIUM ACETATE, POTASSIUM CHLORIDE, MAGNESIUM CHLORIDE, SODIUM PHOSPHATE, DIBASIC, AND POTASSIUM PHOSPHATE: .53; .5; .37; .037; .03; .012; .00082 INJECTION, SOLUTION INTRAVENOUS at 09:04

## 2025-04-17 RX ADMIN — ATORVASTATIN CALCIUM 40 MG: 40 TABLET, FILM COATED ORAL at 11:04

## 2025-04-17 RX ADMIN — NICARDIPINE HYDROCHLORIDE 2.5 MG/HR: 0.2 INJECTION, SOLUTION INTRAVENOUS at 12:04

## 2025-04-17 RX ADMIN — ISOSORBIDE MONONITRATE 120 MG: 60 TABLET, EXTENDED RELEASE ORAL at 12:04

## 2025-04-17 RX ADMIN — HEPARIN SODIUM 2500 UNITS: 1000 INJECTION, SOLUTION INTRAVENOUS; SUBCUTANEOUS at 09:04

## 2025-04-17 RX ADMIN — ONDANSETRON 4 MG: 2 INJECTION INTRAMUSCULAR; INTRAVENOUS at 09:04

## 2025-04-17 RX ADMIN — FENTANYL CITRATE 25 MCG: 50 INJECTION, SOLUTION INTRAMUSCULAR; INTRAVENOUS at 10:04

## 2025-04-17 RX ADMIN — NOREPINEPHRINE BITARTRATE 0.04 MCG/KG/MIN: 1 INJECTION, SOLUTION, CONCENTRATE INTRAVENOUS at 09:04

## 2025-04-17 RX ADMIN — AMLODIPINE BESYLATE 10 MG: 10 TABLET ORAL at 11:04

## 2025-04-17 RX ADMIN — LIDOCAINE HYDROCHLORIDE 60 MG: 20 INJECTION INTRAVENOUS at 09:04

## 2025-04-17 RX ADMIN — GLYCOPYRROLATE 0.2 MG: 0.2 INJECTION, SOLUTION INTRAMUSCULAR; INTRAVENOUS at 09:04

## 2025-04-17 RX ADMIN — PHENYLEPHRINE HYDROCHLORIDE 100 MCG: 10 INJECTION INTRAVENOUS at 09:04

## 2025-04-17 RX ADMIN — HYDROCODONE BITARTRATE AND ACETAMINOPHEN 1 TABLET: 5; 325 TABLET ORAL at 09:04

## 2025-04-17 RX ADMIN — ASPIRIN 81 MG: 81 TABLET, COATED ORAL at 11:04

## 2025-04-17 RX ADMIN — LISINOPRIL 20 MG: 20 TABLET ORAL at 11:04

## 2025-04-17 RX ADMIN — VANCOMYCIN HYDROCHLORIDE 1000 MG: 1 INJECTION, POWDER, LYOPHILIZED, FOR SOLUTION INTRAVENOUS at 09:04

## 2025-04-17 NOTE — PLAN OF CARE
Ms. Marquez is a pleasant 89 y/o W  with a PMH  CAD s/p PCI ISA mLAD, ICM, LBBB (since 2017), HTN, R. Maxillary fracture who  presented to the ED with left shoulder pain and found to be in CHB s/p TVP placement.     Recommendations:   - Keep NPO.   - Pending micra pacemaker placement today.

## 2025-04-17 NOTE — EICU
Virtual ICU Admission    Admit Date: 2025  LOS: 0  Code Status: Full Code   : 3/2/1929  Bed: [id number] A:     Diagnosis: <principal problem not specified>    Patient  has a past medical history of Coronary artery disease, Diabetes mellitus type I, Hyperlipidemia, and Hypertension.    Last VS: BP (!) 217/107 (BP Location: Right arm)   Pulse (!) 56   Temp 98 °F (36.7 °C) (Oral)   Resp 16   Wt 57.5 kg (126 lb 12.2 oz)   SpO2 98%   Breastfeeding No   BMI 21.76 kg/m²       VICU Review    VICU nurse assessment :  [place] completed, LDA documentation reconciliation completed, Skin care/wounds LDA reconciliation, Stress ulcer prophylaxis for ventilated patients review, and VTE prophylaxis review

## 2025-04-17 NOTE — ASSESSMENT & PLAN NOTE
89 y/o W PMH of CAD s/p PCI (9 yrs ago), Ischemic cardiomyopathy, Left bundle branch block presented to ED with left shoulder pain.  In ED EKG  showed complete heart block and Cardiology has been consulted to evaluate complete heart block. In ED TVP was placed and getting admitted to CCU for further management.    Plan:  - patient s/p Micra leadless pacemaker  - plan to monitor overnight  - expected discharge tomorrow

## 2025-04-17 NOTE — ANESTHESIA PREPROCEDURE EVALUATION
04/17/2025  Tonya Marquez is a 89 y.o., female.      Pre-op Assessment    I have reviewed the Patient Summary Reports.       I have reviewed the Medications.     Review of Systems  Anesthesia Hx:  No problems with previous Anesthesia               Denies Personal Hx of Anesthesia complications.                    Cardiovascular:     Hypertension  Past MI CAD    Dysrhythmias  Angina             Cardiovascular Symptoms: Angina       Coronary Artery Disease:          Hx of Myocardial Infarction                  Hypertension     Disorder of Cardiac Rhythm     Renal/:  Chronic Renal Disease        Kidney Function/Disease             Neurological:    Neuromuscular Disease,                                 Neuromuscular Disease   Endocrine:  Diabetes  Hyperthyroidism  Diabetes                Hyperthyroidism                 Physical Exam    Airway:  No airway management difficulties anticipated  Dental:  No active dental issues noted  Chest/Lungs:  Clear to auscultation    Heart:  Rate: Normal  Rhythm: Regular Rhythm  Sounds: Normal        Anesthesia Plan  Type of Anesthesia, risks & benefits discussed:    Anesthesia Type: Gen Supraglottic Airway, Gen Natural Airway  Informed Consent: Informed consent signed with the Patient and all parties understand the risks and agree with anesthesia plan.  All questions answered.   ASA Score: 3  Anesthesia Plan Notes: Chart reviewed. Patient seen and examined. Anesthesia plan discussed and questions answered. E-consent signed. Mohamud Baca MD    Ready For Surgery From Anesthesia Perspective.     .

## 2025-04-17 NOTE — H&P
[facility] - Cardiac Intensive Care  Cardiology  History and Physical     Patient Name: Tonya Marquez  MRN: [id number]  Admission Date: 4/16/2025  Code Status: Full Code   Attending Provider: No att. providers found   Primary Care Physician: Gretchen Pena MD  Principal Problem:<principal problem not specified>    Patient information was obtained from patient, caregiver / friend, past medical records, ER records, and primary team.     Subjective:     Chief Complaint:  left shoulder pain, dizziness     HPI:  89 y/o W PMH of CAD s/p PCI (9 yrs ago), Ischemic cardiomyopathy, Left bundle branch block presented to ED with left shoulder pain. She reports no  chest pain, palpitations or lightheadedness but reports dizziness for past 6 weeks. She had an episode of fall 6 weeks ago on stairs due to dizziness. She also claims shortness of breath with exertion. He BP at home around 130/80s. She follows with Dr. Estrella at [facility] for cardiology and last seen in February for dizziness and BP in 180s. She is chronically on toprol succinate 50mg. Last taken on 4/15/2025 at about 3 pm. Chart review shows last Echo in 2019 wiith EF 69%, Lexiscan in 2021 shows mix apical defect with ST depression > 2 mm. In ED EKG  showed complete heart block and Cardiology has been consulted to evaluate complete heart block. In ED TVP was placed and getting admitted to CCU for further management.    Past Medical History:   Diagnosis Date    Coronary artery disease     Diabetes mellitus type I     Hyperlipidemia     Hypertension        Past Surgical History:   Procedure Laterality Date    ADENOIDECTOMY      CATARACT EXTRACTION, BILATERAL      CORONARY STENT PLACEMENT  08/21/2016    dr. ruiz       Review of patient's allergies indicates:   Allergen Reactions    Amoxicillin     Bactrim [sulfamethoxazole-trimethoprim]        No current facility-administered medications on file prior to encounter.     Current Outpatient Medications on File Prior to  Encounter   Medication Sig    artificial tear,dxtrn-hpm-gly, (GENTEAL TEARS MODERATE) 0.1-0.3-0.2 % Drop 1 drop.    ascorbic acid, vitamin C, (VITAMIN C) 100 MG tablet Take 500 mg by mouth once daily.    aspirin (ECOTRIN) 81 MG EC tablet Take 81 mg by mouth once daily.    atorvastatin (LIPITOR) 40 MG tablet Take 1 tablet (40 mg total) by mouth once daily.    blood sugar diagnostic Strp Check 2x/day    budesonide (PULMICORT) 0.5 mg/2 mL nebulizer solution SMARTSI Milliliter(s) Via Nebulizer Daily    clopidogreL (PLAVIX) 75 mg tablet TAKE 1 TABLET ONE TIME DAILY    colchicine (COLCRYS) 0.6 mg tablet Take 2 tablets by mouth, then one hour later take 1 additional tablet    enalapril (VASOTEC) 20 MG tablet Take 1 tablet by mouth 2 (two) times daily.    flash glucose scanning reader (PlanboxSTYLE COLT 14 DAY READER) Misc 1 Box by Misc.(Non-Drug; Combo Route) route Daily.    flash glucose sensor (FREESTYLE COLT 14 DAY SENSOR) Kit 1 kit by Misc.(Non-Drug; Combo Route) route Daily.    fluticasone propionate (FLONASE) 50 mcg/actuation nasal spray 1 spray (50 mcg total) by Each Nostril route 2 (two) times daily.    glimepiride (AMARYL) 1 MG tablet Take 1 tablet (1 mg total) by mouth before breakfast.    isosorbide mononitrate (IMDUR) 120 MG 24 hr tablet     levocetirizine (XYZAL) 5 MG tablet Take 1 tablet by mouth every evening.    meclizine (ANTIVERT) 25 mg tablet Take 25 mg by mouth 3 (three) times daily as needed.    metoprolol succinate (TOPROL-XL) 50 MG 24 hr tablet Take 1 tablet by mouth once daily.    metoprolol tartrate (LOPRESSOR) 50 MG tablet Take 1 tablet by mouth.    nitroGLYCERIN (NITROSTAT) 0.3 MG SL tablet Place 1 tablet (0.3 mg total) under the tongue every 5 (five) minutes as needed for Chest pain.    PREVIDENT 5000 ENAMEL PROTECT 1.1-5 % Pste     senna (SENOKOT) 8.6 mg tablet Take 8.6 mg by mouth.    senna-docusate 8.6-50 mg (PERICOLACE) 8.6-50 mg per tablet Take 1 tablet by mouth once daily.     Family  History    None       Tobacco Use    Smoking status: Never    Smokeless tobacco: Never   Substance and Sexual Activity    Alcohol use: Not Currently    Drug use: Never    Sexual activity: Never     Review of Systems   Constitutional: Negative for chills and fever.   HENT:  Negative for congestion.    Eyes:  Negative for blurred vision and visual disturbance.   Cardiovascular:  Positive for dyspnea on exertion. Negative for chest pain, irregular heartbeat, leg swelling, near-syncope, orthopnea, palpitations, paroxysmal nocturnal dyspnea and syncope.   Respiratory:  Positive for shortness of breath. Negative for cough, sputum production and wheezing.    Skin:  Negative for color change.   Musculoskeletal:  Positive for joint pain. Negative for arthritis and muscle weakness.        Left shoulder joint pain   Gastrointestinal:  Negative for bloating, abdominal pain, change in bowel habit, nausea and vomiting.   Genitourinary:  Negative for dysuria.   Neurological:  Negative for dizziness and light-headedness.   Psychiatric/Behavioral:  The patient is not nervous/anxious.      Objective:     Vital Signs (Most Recent):  Temp: 97.8 °F (36.6 °C) (04/16/25 2315)  Pulse: (!) 50 (04/17/25 0000)  Resp: 18 (04/17/25 0000)  BP: (!) 196/98 (04/17/25 0000)  SpO2: (!) 94 % (04/17/25 0000) Vital Signs (24h Range):  Temp:  [97.8 °F (36.6 °C)-98.1 °F (36.7 °C)] 97.8 °F (36.6 °C)  Pulse:  [35-62] 50  Resp:  [12-32] 18  SpO2:  [93 %-100 %] 94 %  BP: (143-228)/() 196/98     Weight: 57.5 kg (126 lb 12.2 oz)  Body mass index is 21.09 kg/m².    SpO2: (!) 94 %       No intake or output data in the 24 hours ending 04/17/25 0016    Lines/Drains/Airways       Central Venous Catheter Line  Duration                  Percutaneous Central Line - Cordis 04/16/25 2000 Internal Jugular Right <1 day              Drain  Duration             Female External Urinary Catheter w/ Suction 04/16/25 1801 <1 day              Line  Duration                   "Pacer Wires 04/16/25 2000 <1 day              Peripheral Intravenous Line  Duration                  Peripheral IV - Single Lumen 04/16/25 1547 20 G Anterior;Right Forearm <1 day         Peripheral IV - Single Lumen 04/16/25 2119 20 G Anterior;Proximal;Right Forearm <1 day                     Physical Exam  Constitutional:       General: She is not in acute distress.     Appearance: Normal appearance. She is ill-appearing.   HENT:      Head: Normocephalic and atraumatic.      Mouth/Throat:      Mouth: Mucous membranes are moist.   Eyes:      Extraocular Movements: Extraocular movements intact.      Pupils: Pupils are equal, round, and reactive to light.   Cardiovascular:      Rate and Rhythm: Bradycardia present. Rhythm irregular.      Heart sounds: No murmur heard.     No friction rub. No gallop.   Pulmonary:      Effort: No respiratory distress.      Breath sounds: No rales.   Abdominal:      General: Bowel sounds are normal. There is no distension.      Tenderness: There is no abdominal tenderness.   Musculoskeletal:         General: Tenderness present.      Cervical back: No rigidity.      Right lower leg: No edema.      Left lower leg: No edema.      Comments: Lt shoulder joint   Skin:     General: Skin is warm.      Capillary Refill: Capillary refill takes less than 2 seconds.   Neurological:      General: No focal deficit present.      Mental Status: She is alert and oriented to person, place, and time.   Psychiatric:         Mood and Affect: Mood normal.         Behavior: Behavior normal.          Significant Labs: ABG: No results for input(s): "PH", "PCO2", "HCO3", "POCSATURATED", "BE" in the last 48 hours., Blood Culture: No results for input(s): "LABBLOO" in the last 48 hours., BMP:   Recent Labs   Lab 04/16/25  1538      K 4.2      CO2 19*   BUN 29   CREATININE 1.2   CALCIUM 9.7   , CMP   Recent Labs   Lab 04/16/25  1538      K 4.2      CO2 19*   BUN 29   CREATININE 1.2   CALCIUM " "9.7   ALBUMIN 3.6   BILITOT 0.3   ALKPHOS 83   AST 16   ALT 15   ANIONGAP 10   , CBC   Recent Labs   Lab 04/16/25  1538   WBC 7.56   HGB 11.2*   HCT 34.0*      , INR No results for input(s): "INR", "PROTIME" in the last 48 hours., Lipid Panel No results for input(s): "CHOL", "HDL", "LDLCALC", "TRIG", "CHOLHDL" in the last 48 hours., and Troponin   Recent Labs   Lab 04/16/25  1538   TROPONINIHS 11       Significant Imaging: Echocardiogram: 2D echo with color flow doppler: No results found for this or any previous visit.  Assessment and Plan:     Localized pain of left shoulder joint  Appears chronic  Xray left shoulder no acute displaced fracture-dislocation identified  Will monitor  PRN analgesic      Hypertensive urgency  Patient has a current diagnosis of hypertensive urgency (without evidence of end organ damage) which is uncontrolled.  Latest blood pressure and vitals reviewed-   Temp:  [97.8 °F (36.6 °C)-98.1 °F (36.7 °C)]   Pulse:  [35-62]   Resp:  [12-25]   BP: (143-228)/()   SpO2:  [93 %-100 %] .   Patient currently off IV antihypertensives.   Home meds for hypertension were reviewed and noted below.   Hypertension Medications              enalapril (VASOTEC) 20 MG tablet Take 1 tablet by mouth 2 (two) times daily.    isosorbide mononitrate (IMDUR) 120 MG 24 hr tablet     metoprolol succinate (TOPROL-XL) 50 MG 24 hr tablet Take 1 tablet by mouth once daily.    metoprolol tartrate (LOPRESSOR) 50 MG tablet Take 1 tablet by mouth.    nitroGLYCERIN (NITROSTAT) 0.3 MG SL tablet Place 1 tablet (0.3 mg total) under the tongue every 5 (five) minutes as needed for Chest pain.            Medication adjustment for hospital antihypertensives is as follows-   Hold home Toprol given complete heart block  Lisinopril in place of home enalapril  Received one dose of Hydralazine 5 mg  Better pain control for left should joint pain  If no recovery then will consider starting IV antihypertensive    Will aim for " controlled BP reduction by medications noted above. Monitor and mitigate end organ damage as indicated.    CAD (coronary artery disease)  Continue home Aspirin, statin  Hold home plavix    CHB (complete heart block)  89 y/o W PMH of CAD s/p PCI (9 yrs ago), Ischemic cardiomyopathy, Left bundle branch block presented to ED with left shoulder pain.  In ED EKG  showed complete heart block and Cardiology has been consulted to evaluate complete heart block. In ED TVP was placed and getting admitted to CCU for further management.    Echo 2019 with LVEF 69%  Lexiscan  2021 shows mix apical defect with ST depression > 2 mm.  EKG 4/16/25 complete heart block  S/p TVP, threshold 0.2, set HR 50, output 10  Get TTE  EP consult for PPM consideration  NPO MN        Type 2 diabetes mellitus with diabetic polyneuropathy, without long-term current use of insulin  Insulin sliding scale  Will monitor        VTE Risk Mitigation (From admission, onward)           Ordered     IP VTE HIGH RISK PATIENT  Once         04/16/25 2036     Place sequential compression device  Until discontinued         04/16/25 2036                    Liu Mckinnon MD  Cardiology   [facility] - Cardiac Intensive Care

## 2025-04-17 NOTE — ASSESSMENT & PLAN NOTE
Continue home Aspirin, statin  Hold home plavix- patient likely can be converted to SAPT, unclear why still on DAPT 9 years post reported PCI

## 2025-04-17 NOTE — SUBJECTIVE & OBJECTIVE
Past Medical History:   Diagnosis Date    Coronary artery disease     Diabetes mellitus type I     Hyperlipidemia     Hypertension        Past Surgical History:   Procedure Laterality Date    ADENOIDECTOMY      CATARACT EXTRACTION, BILATERAL      CORONARY STENT PLACEMENT  2016    dr. ruiz       Review of patient's allergies indicates:   Allergen Reactions    Amoxicillin     Bactrim [sulfamethoxazole-trimethoprim]        No current facility-administered medications on file prior to encounter.     Current Outpatient Medications on File Prior to Encounter   Medication Sig    artificial tear,dxtrn-hpm-gly, (GENTEAL TEARS MODERATE) 0.1-0.3-0.2 % Drop 1 drop.    ascorbic acid, vitamin C, (VITAMIN C) 100 MG tablet Take 500 mg by mouth once daily.    aspirin (ECOTRIN) 81 MG EC tablet Take 81 mg by mouth once daily.    atorvastatin (LIPITOR) 40 MG tablet Take 1 tablet (40 mg total) by mouth once daily.    blood sugar diagnostic Strp Check 2x/day    budesonide (PULMICORT) 0.5 mg/2 mL nebulizer solution SMARTSI Milliliter(s) Via Nebulizer Daily    clopidogreL (PLAVIX) 75 mg tablet TAKE 1 TABLET ONE TIME DAILY    colchicine (COLCRYS) 0.6 mg tablet Take 2 tablets by mouth, then one hour later take 1 additional tablet    enalapril (VASOTEC) 20 MG tablet Take 1 tablet by mouth 2 (two) times daily.    flash glucose scanning reader (FREESTYLE COLT 14 DAY READER) Misc 1 Box by Misc.(Non-Drug; Combo Route) route Daily.    flash glucose sensor (FREESTYLE COLT 14 DAY SENSOR) Kit 1 kit by Misc.(Non-Drug; Combo Route) route Daily.    fluticasone propionate (FLONASE) 50 mcg/actuation nasal spray 1 spray (50 mcg total) by Each Nostril route 2 (two) times daily.    glimepiride (AMARYL) 1 MG tablet Take 1 tablet (1 mg total) by mouth before breakfast.    isosorbide mononitrate (IMDUR) 120 MG 24 hr tablet     levocetirizine (XYZAL) 5 MG tablet Take 1 tablet by mouth every evening.    meclizine (ANTIVERT) 25 mg tablet Take 25 mg  by mouth 3 (three) times daily as needed.    metoprolol succinate (TOPROL-XL) 50 MG 24 hr tablet Take 1 tablet by mouth once daily.    metoprolol tartrate (LOPRESSOR) 50 MG tablet Take 1 tablet by mouth.    nitroGLYCERIN (NITROSTAT) 0.3 MG SL tablet Place 1 tablet (0.3 mg total) under the tongue every 5 (five) minutes as needed for Chest pain.    PREVIDENT 5000 ENAMEL PROTECT 1.1-5 % Pste     senna (SENOKOT) 8.6 mg tablet Take 8.6 mg by mouth.    senna-docusate 8.6-50 mg (PERICOLACE) 8.6-50 mg per tablet Take 1 tablet by mouth once daily.     Family History    None       Tobacco Use    Smoking status: Never    Smokeless tobacco: Never   Substance and Sexual Activity    Alcohol use: Not Currently    Drug use: Never    Sexual activity: Never     Review of Systems   Constitutional: Negative for chills and fever.   HENT:  Negative for congestion.    Eyes:  Negative for blurred vision and visual disturbance.   Cardiovascular:  Positive for dyspnea on exertion. Negative for chest pain, irregular heartbeat, leg swelling, near-syncope, orthopnea, palpitations, paroxysmal nocturnal dyspnea and syncope.   Respiratory:  Positive for shortness of breath. Negative for cough, sputum production and wheezing.    Skin:  Negative for color change.   Musculoskeletal:  Positive for joint pain. Negative for arthritis and muscle weakness.        Left shoulder joint pain   Gastrointestinal:  Negative for bloating, abdominal pain, change in bowel habit, nausea and vomiting.   Genitourinary:  Negative for dysuria.   Neurological:  Negative for dizziness and light-headedness.   Psychiatric/Behavioral:  The patient is not nervous/anxious.      Objective:     Vital Signs (Most Recent):  Temp: 97.8 °F (36.6 °C) (04/16/25 2315)  Pulse: (!) 50 (04/17/25 0000)  Resp: 18 (04/17/25 0000)  BP: (!) 196/98 (04/17/25 0000)  SpO2: (!) 94 % (04/17/25 0000) Vital Signs (24h Range):  Temp:  [97.8 °F (36.6 °C)-98.1 °F (36.7 °C)] 97.8 °F (36.6 °C)  Pulse:   [35-62] 50  Resp:  [12-32] 18  SpO2:  [93 %-100 %] 94 %  BP: (143-228)/() 196/98     Weight: 57.5 kg (126 lb 12.2 oz)  Body mass index is 21.09 kg/m².    SpO2: (!) 94 %       No intake or output data in the 24 hours ending 04/17/25 0016    Lines/Drains/Airways       Central Venous Catheter Line  Duration                  Percutaneous Central Line - Cordis 04/16/25 2000 Internal Jugular Right <1 day              Drain  Duration             Female External Urinary Catheter w/ Suction 04/16/25 1801 <1 day              Line  Duration                  Pacer Wires 04/16/25 2000 <1 day              Peripheral Intravenous Line  Duration                  Peripheral IV - Single Lumen 04/16/25 1547 20 G Anterior;Right Forearm <1 day         Peripheral IV - Single Lumen 04/16/25 2119 20 G Anterior;Proximal;Right Forearm <1 day                     Physical Exam  Constitutional:       General: She is not in acute distress.     Appearance: Normal appearance. She is ill-appearing.   HENT:      Head: Normocephalic and atraumatic.      Mouth/Throat:      Mouth: Mucous membranes are moist.   Eyes:      Extraocular Movements: Extraocular movements intact.      Pupils: Pupils are equal, round, and reactive to light.   Cardiovascular:      Rate and Rhythm: Bradycardia present. Rhythm irregular.      Heart sounds: No murmur heard.     No friction rub. No gallop.   Pulmonary:      Effort: No respiratory distress.      Breath sounds: No rales.   Abdominal:      General: Bowel sounds are normal. There is no distension.      Tenderness: There is no abdominal tenderness.   Musculoskeletal:         General: Tenderness present.      Cervical back: No rigidity.      Right lower leg: No edema.      Left lower leg: No edema.      Comments: Lt shoulder joint   Skin:     General: Skin is warm.      Capillary Refill: Capillary refill takes less than 2 seconds.   Neurological:      General: No focal deficit present.      Mental Status: She is  "alert and oriented to person, place, and time.   Psychiatric:         Mood and Affect: Mood normal.         Behavior: Behavior normal.          Significant Labs: ABG: No results for input(s): "PH", "PCO2", "HCO3", "POCSATURATED", "BE" in the last 48 hours., Blood Culture: No results for input(s): "LABBLOO" in the last 48 hours., BMP:   Recent Labs   Lab 04/16/25  1538      K 4.2      CO2 19*   BUN 29   CREATININE 1.2   CALCIUM 9.7   , CMP   Recent Labs   Lab 04/16/25  1538      K 4.2      CO2 19*   BUN 29   CREATININE 1.2   CALCIUM 9.7   ALBUMIN 3.6   BILITOT 0.3   ALKPHOS 83   AST 16   ALT 15   ANIONGAP 10   , CBC   Recent Labs   Lab 04/16/25  1538   WBC 7.56   HGB 11.2*   HCT 34.0*      , INR No results for input(s): "INR", "PROTIME" in the last 48 hours., Lipid Panel No results for input(s): "CHOL", "HDL", "LDLCALC", "TRIG", "CHOLHDL" in the last 48 hours., and Troponin   Recent Labs   Lab 04/16/25  1538   TROPONINIHS 11       Significant Imaging: Echocardiogram: 2D echo with color flow doppler: No results found for this or any previous visit.  "

## 2025-04-17 NOTE — PROGRESS NOTES
[facility] - [facility]  Cardiology  Progress Note    Patient Name: Tonya Marquez  MRN: [id number]  Admission Date: 4/16/2025  Hospital Length of Stay: 1 days  Code Status: Full Code   Attending Physician: Jean Pierre Mata MD   Primary Care Physician: Gretchen Pena MD  Expected Discharge Date: 4/19/2025  Principal Problem:<principal problem not specified>    Subjective:     Hospital Course:   No notes on file    Interval History:   - admitted overnight with CHB  - received TVP  - went for PPM this AM    Review of Systems   Constitutional: Positive for malaise/fatigue. Negative for chills and fever.   Cardiovascular:  Positive for syncope. Negative for chest pain, dyspnea on exertion, leg swelling and orthopnea.   Respiratory:  Negative for shortness of breath.    Musculoskeletal:  Positive for falls.     Objective:     Vital Signs (Most Recent):  Temp: 98 °F (36.7 °C) (04/17/25 1103)  Pulse: 72 (04/17/25 1330)  Resp: 15 (04/17/25 1330)  BP: (!) 123/58 (04/17/25 1330)  SpO2: 96 % (04/17/25 1330) Vital Signs (24h Range):  Temp:  [97.4 °F (36.3 °C)-98.1 °F (36.7 °C)] 98 °F (36.7 °C)  Pulse:  [35-74] 72  Resp:  [12-33] 15  SpO2:  [93 %-100 %] 96 %  BP: (123-228)/() 123/58     Weight: 57 kg (125 lb 10.6 oz)  Body mass index is 20.91 kg/m².     SpO2: 96 %         Intake/Output Summary (Last 24 hours) at 4/17/2025 1508  Last data filed at 4/17/2025 1305  Gross per 24 hour   Intake 517.87 ml   Output 1255 ml   Net -737.13 ml       Lines/Drains/Airways       Central Venous Catheter Line  Duration                  Percutaneous Central Line - Cordis 04/16/25 2000 Internal Jugular Right <1 day              Drain  Duration             Female External Urinary Catheter w/ Suction 04/16/25 1801 <1 day              Line  Duration                  Pacer Wires 04/16/25 2000 <1 day              Peripheral Intravenous Line  Duration                  Peripheral IV - Single Lumen 04/16/25 1547 20 G Anterior;Right  "Forearm <1 day         Peripheral IV - Single Lumen 04/16/25 2119 20 G Anterior;Proximal;Right Forearm <1 day         Peripheral IV - Single Lumen 04/17/25 0830 20 G Left;Posterior Forearm <1 day                       Physical Exam  Vitals reviewed.   Constitutional:       General: She is not in acute distress.  Eyes:      General: No scleral icterus.  Cardiovascular:      Rate and Rhythm: Normal rate and regular rhythm.   Pulmonary:      Effort: Pulmonary effort is normal. No respiratory distress.   Abdominal:      General: There is no distension.      Palpations: Abdomen is soft.   Musculoskeletal:      Right lower leg: No edema.      Left lower leg: No edema.   Skin:     General: Skin is warm and dry.   Neurological:      Mental Status: She is alert and oriented to person, place, and time.   Psychiatric:         Mood and Affect: Mood normal.         Thought Content: Thought content normal.            Significant Labs: ABG: No results for input(s): "PH", "PCO2", "HCO3", "POCSATURATED", "BE" in the last 48 hours., CMP   Recent Labs   Lab 04/16/25  1538      K 4.2      CO2 19*   BUN 29   CREATININE 1.2   CALCIUM 9.7   ALBUMIN 3.6   BILITOT 0.3   ALKPHOS 83   AST 16   ALT 15   ANIONGAP 10   , CBC   Recent Labs   Lab 04/16/25  1538   WBC 7.56   HGB 11.2*   HCT 34.0*      , INR No results for input(s): "INR", "PROTIME" in the last 48 hours., Lipid Panel No results for input(s): "CHOL", "HDL", "LDLCALC", "TRIG", "CHOLHDL" in the last 48 hours., Troponin   Recent Labs   Lab 04/16/25  1538   TROPONINIHS 11   , and All pertinent lab results from the last 24 hours have been reviewed.    Significant Imaging: Echocardiogram: Transthoracic echo (TTE) complete (Cupid Only):   Results for orders placed or performed during the hospital encounter of 04/16/25   Echo   Result Value Ref Range    LV Diastolic Volume 75 mL    Echo EF Estimated 66 %    LV Systolic Volume 26 mL    IVS 0.9 0.6 - 1.1 cm    LVIDd 4.1 3.5 " - 6.0 cm    LVIDs 2.7 2.1 - 4.0 cm    LVOT diameter 2.0 cm    PW 0.8 0.6 - 1.1 cm    AV LVOT peak gradient 4 mmHg    LVOT mn grad 2 mmHg    LVOT peak jarrett 0.9 m/s    LVOT peak VTI 21.9 cm    RV- chiu basal diam 3.5 cm    LA size 3.5 cm    Left Atrium Major Axis 6.0 cm    Left Atrium Minor Axis 6.3 cm    LA Vol (MOD) 71 mL    RA Major [place] 5.17 cm    AV valve area 1.7 cm2    AV area by cont VTI 1.7 cm2    AV peak gradient 19 mmHg    AV mean gradient 11 mmHg    Ao peak jarrett 2.2 m/s    Ao VTI 41.5 cm    MV Peak A Jarrett 1.37 m/s    E wave deceleration time 373 ms    MV Peak E Jarrett 0.86 m/s    E/A ratio 0.63     LV LATERAL E/E' RATIO 12.3     LV SEPTAL E/E' RATIO 14.3     TDI LATERAL 0.07 m/s    TDI SEPTAL 0.06 m/s    MV peak gradient 10 mmHg    MV mean gradient 3 mmHg    TV peak gradient 44 mmHg    TR Max Jarrett 3.3 m/s    Ascending aorta 2.47 cm    STJ 2.40 cm    Sinus 2.83 cm    LA WIDTH 4.1 cm    RA Width 2.88 cm    TAPSE 1.14 cm    BSA 1.62 m2    LVOT stroke volume 68.8 cm3    LV Systolic Volume Index 16.0 mL/m2    LV Diastolic Volume Index 46.30 mL/m2    LVOT area 3.1 cm2    FS 34.1 28 - 44 %    Left Ventricle Relative Wall Thickness 0.39 cm    LV mass 105.6 g    LV Mass Index 65.2 g/m2    E/E' ratio 13 m/s    ALEX 46 mL/m2    LA Vol 75 cm3    RV/LV Ratio 0.85 cm    ALEX (MOD) 44 mL/m2    AV Velocity Ratio 0.41     AV index (prosthetic) 0.53     MARISEL by Velocity Ratio 1.3 cm²    Mean e' 0.07 m/s    ZLVIDS -0.40     ZLVIDD -1.11     TV resting pulmonary artery pressure 47 mmHg    RV TB RVSP 6 mmHg    Est. RA pres 3 mmHg    Narrative      Left Ventricle: The left ventricle is normal in size. Normal wall   thickness. Normal wall motion. There is normal systolic function with a   visually estimated ejection fraction of 60 - 65%. Grade II diastolic   dysfunction.    Right Ventricle: The right ventricle is normal in size. Wall thickness   is normal. Systolic function is normal.    Left Atrium: Moderately dilated    Aortic Valve:  The aortic valve is a trileaflet valve. There is mild   aortic valve sclerosis. Mildly restricted motion. There is mild stenosis.   Aortic valve area by VTI is 1.7 cm2. Aortic valve peak velocity is 2.2   m/s. Mean gradient is 11 mmHg. The dimensionless index is 0.53.    Mitral Valve: There is mild regurgitation.    Pulmonary Artery: There is mild pulmonary hypertension. The estimated   pulmonary artery systolic pressure is 47 mmHg.    IVC/SVC: Normal venous pressure at 3 mmHg.       Assessment and Plan:     Brief HPI:     Localized pain of left shoulder joint  Appears chronic  Xray left shoulder no acute displaced fracture-dislocation identified  Will monitor  PRN analgesic      Primary hypertension   Plan:  - resumed patient's home anti-hypertensive medications  - will continue to monitor    CAD (coronary artery disease)  Continue home Aspirin, statin  Hold home plavix- patient likely can be converted to SAPT, unclear why still on DAPT 9 years post reported PCI    CHB (complete heart block)  89 y/o W PMH of CAD s/p PCI (9 yrs ago), Ischemic cardiomyopathy, Left bundle branch block presented to ED with left shoulder pain.  In ED EKG  showed complete heart block and Cardiology has been consulted to evaluate complete heart block. In ED TVP was placed and getting admitted to CCU for further management.    Plan:  - patient s/p Micra leadless pacemaker  - plan to monitor overnight  - expected discharge tomorrow        Type 2 diabetes mellitus with diabetic polyneuropathy, without long-term current use of insulin  Insulin sliding scale  Will monitor        VTE Risk Mitigation (From admission, onward)           Ordered     IP VTE HIGH RISK PATIENT  Once         04/16/25 2036     Place sequential compression device  Until discontinued         04/16/25 2036                    Cornel Schultz MD  Cardiology  [facility]

## 2025-04-17 NOTE — SUBJECTIVE & OBJECTIVE
Interval History:   - admitted overnight with CHB  - received TVP  - went for PPM this AM    Review of Systems   Constitutional: Positive for malaise/fatigue. Negative for chills and fever.   Cardiovascular:  Positive for syncope. Negative for chest pain, dyspnea on exertion, leg swelling and orthopnea.   Respiratory:  Negative for shortness of breath.    Musculoskeletal:  Positive for falls.     Objective:     Vital Signs (Most Recent):  Temp: 98 °F (36.7 °C) (04/17/25 1103)  Pulse: 72 (04/17/25 1330)  Resp: 15 (04/17/25 1330)  BP: (!) 123/58 (04/17/25 1330)  SpO2: 96 % (04/17/25 1330) Vital Signs (24h Range):  Temp:  [97.4 °F (36.3 °C)-98.1 °F (36.7 °C)] 98 °F (36.7 °C)  Pulse:  [35-74] 72  Resp:  [12-33] 15  SpO2:  [93 %-100 %] 96 %  BP: (123-228)/() 123/58     Weight: 57 kg (125 lb 10.6 oz)  Body mass index is 20.91 kg/m².     SpO2: 96 %         Intake/Output Summary (Last 24 hours) at 4/17/2025 1508  Last data filed at 4/17/2025 1305  Gross per 24 hour   Intake 517.87 ml   Output 1255 ml   Net -737.13 ml       Lines/Drains/Airways       Central Venous Catheter Line  Duration                  Percutaneous Central Line - Cordis 04/16/25 2000 Internal Jugular Right <1 day              Drain  Duration             Female External Urinary Catheter w/ Suction 04/16/25 1801 <1 day              Line  Duration                  Pacer Wires 04/16/25 2000 <1 day              Peripheral Intravenous Line  Duration                  Peripheral IV - Single Lumen 04/16/25 1547 20 G Anterior;Right Forearm <1 day         Peripheral IV - Single Lumen 04/16/25 2119 20 G Anterior;Proximal;Right Forearm <1 day         Peripheral IV - Single Lumen 04/17/25 0830 20 G Left;Posterior Forearm <1 day                       Physical Exam  Vitals reviewed.   Constitutional:       General: She is not in acute distress.  Eyes:      General: No scleral icterus.  Cardiovascular:      Rate and Rhythm: Normal rate and regular rhythm.  "  Pulmonary:      Effort: Pulmonary effort is normal. No respiratory distress.   Abdominal:      General: There is no distension.      Palpations: Abdomen is soft.   Musculoskeletal:      Right lower leg: No edema.      Left lower leg: No edema.   Skin:     General: Skin is warm and dry.   Neurological:      Mental Status: She is alert and oriented to person, place, and time.   Psychiatric:         Mood and Affect: Mood normal.         Thought Content: Thought content normal.            Significant Labs: ABG: No results for input(s): "PH", "PCO2", "HCO3", "POCSATURATED", "BE" in the last 48 hours., CMP   Recent Labs   Lab 04/16/25  1538      K 4.2      CO2 19*   BUN 29   CREATININE 1.2   CALCIUM 9.7   ALBUMIN 3.6   BILITOT 0.3   ALKPHOS 83   AST 16   ALT 15   ANIONGAP 10   , CBC   Recent Labs   Lab 04/16/25  1538   WBC 7.56   HGB 11.2*   HCT 34.0*      , INR No results for input(s): "INR", "PROTIME" in the last 48 hours., Lipid Panel No results for input(s): "CHOL", "HDL", "LDLCALC", "TRIG", "CHOLHDL" in the last 48 hours., Troponin   Recent Labs   Lab 04/16/25  1538   TROPONINIHS 11   , and All pertinent lab results from the last 24 hours have been reviewed.    Significant Imaging: Echocardiogram: Transthoracic echo (TTE) complete (Cupid Only):   Results for orders placed or performed during the hospital encounter of 04/16/25   Echo   Result Value Ref Range    LV Diastolic Volume 75 mL    Echo EF Estimated 66 %    LV Systolic Volume 26 mL    IVS 0.9 0.6 - 1.1 cm    LVIDd 4.1 3.5 - 6.0 cm    LVIDs 2.7 2.1 - 4.0 cm    LVOT diameter 2.0 cm    PW 0.8 0.6 - 1.1 cm    AV LVOT peak gradient 4 mmHg    LVOT mn grad 2 mmHg    LVOT peak tk 0.9 m/s    LVOT peak VTI 21.9 cm    RV- chiu basal diam 3.5 cm    LA size 3.5 cm    Left Atrium Major Axis 6.0 cm    Left Atrium Minor Axis 6.3 cm    LA Vol (MOD) 71 mL    RA Major [place] 5.17 cm    AV valve area 1.7 cm2    AV area by cont VTI 1.7 cm2    AV peak gradient " 19 mmHg    AV mean gradient 11 mmHg    Ao peak jarrett 2.2 m/s    Ao VTI 41.5 cm    MV Peak A Jarrett 1.37 m/s    E wave deceleration time 373 ms    MV Peak E Jarrett 0.86 m/s    E/A ratio 0.63     LV LATERAL E/E' RATIO 12.3     LV SEPTAL E/E' RATIO 14.3     TDI LATERAL 0.07 m/s    TDI SEPTAL 0.06 m/s    MV peak gradient 10 mmHg    MV mean gradient 3 mmHg    TV peak gradient 44 mmHg    TR Max Jarrett 3.3 m/s    Ascending aorta 2.47 cm    STJ 2.40 cm    Sinus 2.83 cm    LA WIDTH 4.1 cm    RA Width 2.88 cm    TAPSE 1.14 cm    BSA 1.62 m2    LVOT stroke volume 68.8 cm3    LV Systolic Volume Index 16.0 mL/m2    LV Diastolic Volume Index 46.30 mL/m2    LVOT area 3.1 cm2    FS 34.1 28 - 44 %    Left Ventricle Relative Wall Thickness 0.39 cm    LV mass 105.6 g    LV Mass Index 65.2 g/m2    E/E' ratio 13 m/s    ALEX 46 mL/m2    LA Vol 75 cm3    RV/LV Ratio 0.85 cm    ALEX (MOD) 44 mL/m2    AV Velocity Ratio 0.41     AV index (prosthetic) 0.53     MARISEL by Velocity Ratio 1.3 cm²    Mean e' 0.07 m/s    ZLVIDS -0.40     ZLVIDD -1.11     TV resting pulmonary artery pressure 47 mmHg    RV TB RVSP 6 mmHg    Est. RA pres 3 mmHg    Narrative      Left Ventricle: The left ventricle is normal in size. Normal wall   thickness. Normal wall motion. There is normal systolic function with a   visually estimated ejection fraction of 60 - 65%. Grade II diastolic   dysfunction.    Right Ventricle: The right ventricle is normal in size. Wall thickness   is normal. Systolic function is normal.    Left Atrium: Moderately dilated    Aortic Valve: The aortic valve is a trileaflet valve. There is mild   aortic valve sclerosis. Mildly restricted motion. There is mild stenosis.   Aortic valve area by VTI is 1.7 cm2. Aortic valve peak velocity is 2.2   m/s. Mean gradient is 11 mmHg. The dimensionless index is 0.53.    Mitral Valve: There is mild regurgitation.    Pulmonary Artery: There is mild pulmonary hypertension. The estimated   pulmonary artery systolic pressure  is 47 mmHg.    IVC/SVC: Normal venous pressure at 3 mmHg.

## 2025-04-17 NOTE — BRIEF OP NOTE
Attending: Eamon Elizabeth MD  Date of Procedure: 04/17/2025    Post-operative Diagnosis: CHB    Procedure Performed: Micra implantation    Description of Procedure: Patient presented to the EP lab in the fasting state. Prepped and draped in sterile fashion. Prophylactic antibiotics given. Safety time out performed. Sedation per anesthesia. Lidocaine for local anesthetic. Ultrasound guided right femoral vein access x1. Micra leadless pacemaker delivered via right femoral vein access into the right ventricle. Parameters checked with adequate sensing, threshold, and impedance. Sutures x 2 to right groin access site.    EBL: <10 mL    Specimens: none  Complications: no immediate    Plan:  Bedrest x 2 hours  Please notify EP for any evidence of pacemaker malfunction      The attending physician was present for entire duration of the procedure    Philly Mariee MD, PGY8  Electrophysiology

## 2025-04-17 NOTE — NURSING
"Attempted to insert PIV x1 due to IV infiltration. After failed attempt pt states "I refuse all future IV's. One is plenty". Talked to pt about the importance of 2 PIV's while in the ICU. Pt still refused.   "

## 2025-04-17 NOTE — PLAN OF CARE
"CICU Care Plan    POC reviewed with Tonya Marquez and family at 0300. Patient verbalized understanding. Questions and concerns addressed. No acute events today. Pt progressing toward goals. Will continue to monitor. See below and flowsheets for full assessment and VS info.     No acute events overnight  Complaints of 9/10 L shoulder pain - one time dose of 0.2mg Diluadid ordered; administered  Hypertensive overnight despite PRN hydralazine  Cardene gtt ordered and currently at 2.5mg/hr - SBP <180  TVP - rate 50; output 10  Plans for a PPM today  Pts questions and concerns addressed throughout entire shift        Neuro:  Brigida Coma Scale  Best Eye Response: 4-->(E4) spontaneous  Best Motor Response: 6-->(M6) obeys commands  Best Verbal Response: 5-->(V5) oriented  Brigida Coma Scale Score: 15  Assessment Qualifiers: patient not sedated/intubated  Pupil PERRLA: yes     24 hr Temp:  [97.6 °F (36.4 °C)-98.1 °F (36.7 °C)]     CV:   Rhythm: paced rhythm  BP goals:   SBP < 180  MAP > 65    Resp:           Plan: N/A    GI/:        Last Bowel Movement: 04/15/25       Intake/Output Summary (Last 24 hours) at 4/17/2025 0718  Last data filed at 4/17/2025 0630  Gross per 24 hour   Intake 75.16 ml   Output 700 ml   Net -624.84 ml     Unmeasured Output  Stool Occurrence: 0    Labs/Accuchecks:  Recent Labs   Lab 04/16/25  1538   WBC 7.56   RBC 3.95*   HGB 11.2*   HCT 34.0*         Recent Labs   Lab 04/16/25  1538      K 4.2   CO2 19*      BUN 29   CREATININE 1.2   ALKPHOS 83   ALT 15   AST 16   BILITOT 0.3    No results for input(s): "PROTIME", "INR", "APTT", "HEPANTIXA" in the last 168 hours. No results for input(s): "CPK", "CPKMB", "TROPONINI", "MB" in the last 168 hours.    Electrolytes: N/A - electrolytes WDL  Accuchecks: none    Gtts:   nicardipine  0-15 mg/hr Intravenous Continuous 12.5 mL/hr at 04/17/25 0629 2.5 mg/hr at 04/17/25 0629       LDA/Wounds:    Chivo Risk Assessment  Sensory Perception: " 3-->slightly limited  Moisture: 3-->occasionally moist  Activity: 3-->walks occasionally  Mobility: 3-->slightly limited  Nutrition: 3-->adequate  Friction and Shear: 3-->no apparent problem  Chivo Score: 18  Is your chivo score 12 or less? no          Restraints:        [facility]

## 2025-04-17 NOTE — SUBJECTIVE & OBJECTIVE
Past Medical History:   Diagnosis Date    Coronary artery disease     Diabetes mellitus type I     Hyperlipidemia     Hypertension        Past Surgical History:   Procedure Laterality Date    ADENOIDECTOMY      CATARACT EXTRACTION, BILATERAL      CORONARY STENT PLACEMENT  2016    dr. ruiz       Review of patient's allergies indicates:   Allergen Reactions    Amoxicillin     Bactrim [sulfamethoxazole-trimethoprim]        No current facility-administered medications on file prior to encounter.     Current Outpatient Medications on File Prior to Encounter   Medication Sig    artificial tear,dxtrn-hpm-gly, (GENTEAL TEARS MODERATE) 0.1-0.3-0.2 % Drop 1 drop.    ascorbic acid, vitamin C, (VITAMIN C) 100 MG tablet Take 500 mg by mouth once daily.    aspirin (ECOTRIN) 81 MG EC tablet Take 81 mg by mouth once daily.    atorvastatin (LIPITOR) 40 MG tablet Take 1 tablet (40 mg total) by mouth once daily.    blood sugar diagnostic Strp Check 2x/day    budesonide (PULMICORT) 0.5 mg/2 mL nebulizer solution SMARTSI Milliliter(s) Via Nebulizer Daily    clopidogreL (PLAVIX) 75 mg tablet TAKE 1 TABLET ONE TIME DAILY    colchicine (COLCRYS) 0.6 mg tablet Take 2 tablets by mouth, then one hour later take 1 additional tablet    enalapril (VASOTEC) 20 MG tablet Take 1 tablet by mouth 2 (two) times daily.    flash glucose scanning reader (FREESTYLE COLT 14 DAY READER) Misc 1 Box by Misc.(Non-Drug; Combo Route) route Daily.    flash glucose sensor (FREESTYLE COLT 14 DAY SENSOR) Kit 1 kit by Misc.(Non-Drug; Combo Route) route Daily.    fluticasone propionate (FLONASE) 50 mcg/actuation nasal spray 1 spray (50 mcg total) by Each Nostril route 2 (two) times daily.    glimepiride (AMARYL) 1 MG tablet Take 1 tablet (1 mg total) by mouth before breakfast.    isosorbide mononitrate (IMDUR) 120 MG 24 hr tablet     levocetirizine (XYZAL) 5 MG tablet Take 1 tablet by mouth every evening.    meclizine (ANTIVERT) 25 mg tablet Take 25 mg  by mouth 3 (three) times daily as needed.    metoprolol succinate (TOPROL-XL) 50 MG 24 hr tablet Take 1 tablet by mouth once daily.    metoprolol tartrate (LOPRESSOR) 50 MG tablet Take 1 tablet by mouth.    nitroGLYCERIN (NITROSTAT) 0.3 MG SL tablet Place 1 tablet (0.3 mg total) under the tongue every 5 (five) minutes as needed for Chest pain.    PREVIDENT 5000 ENAMEL PROTECT 1.1-5 % Pste     senna (SENOKOT) 8.6 mg tablet Take 8.6 mg by mouth.    senna-docusate 8.6-50 mg (PERICOLACE) 8.6-50 mg per tablet Take 1 tablet by mouth once daily.     Family History    None       Tobacco Use    Smoking status: Never    Smokeless tobacco: Never   Substance and Sexual Activity    Alcohol use: Not Currently    Drug use: Never    Sexual activity: Never     Review of Systems   Constitutional: Negative for chills, fever and malaise/fatigue.   HENT:  Negative for congestion and sore throat.    Cardiovascular:  Negative for chest pain, dyspnea on exertion, irregular heartbeat, near-syncope, palpitations and syncope.   Gastrointestinal:  Negative for nausea and vomiting.   Neurological:  Negative for dizziness, headaches, light-headedness and loss of balance.   Psychiatric/Behavioral:  Positive for memory loss. Negative for altered mental status and hallucinations. The patient is not nervous/anxious.    Objective:     Vital Signs (Most Recent):  Temp: 97.8 °F (36.6 °C) (04/16/25 2315)  Pulse: (!) 50 (04/17/25 0000)  Resp: 18 (04/17/25 0000)  BP: (!) 196/98 (04/17/25 0000)  SpO2: (!) 94 % (04/17/25 0000) Vital Signs (24h Range):  Temp:  [97.8 °F (36.6 °C)-98.1 °F (36.7 °C)] 97.8 °F (36.6 °C)  Pulse:  [35-62] 50  Resp:  [12-32] 18  SpO2:  [93 %-100 %] 94 %  BP: (143-228)/() 196/98       Weight: 57.5 kg (126 lb 12.2 oz)  Body mass index is 21.09 kg/m².    SpO2: (!) 94 %        Physical Exam  Vitals and nursing note reviewed.   Constitutional:       General: She is not in acute distress.     Appearance: Normal appearance. She is  normal weight. She is not ill-appearing, toxic-appearing or diaphoretic.   HENT:      Head:      Comments: R. Facial asymmetry (from prior fracture)     Nose: Nose normal. No rhinorrhea.      Mouth/Throat:      Mouth: Mucous membranes are moist.      Pharynx: Oropharynx is clear.   Eyes:      General: No scleral icterus.     Extraocular Movements: Extraocular movements intact.   Cardiovascular:      Rate and Rhythm: Bradycardia present.      Heart sounds: No murmur heard.     Comments: CHB  Pulmonary:      Effort: Pulmonary effort is normal. No respiratory distress.      Breath sounds: Normal breath sounds.   Abdominal:      General: Abdomen is flat.      Palpations: Abdomen is soft.   Musculoskeletal:         General: No swelling or tenderness. Normal range of motion.      Cervical back: Normal range of motion and neck supple.   Skin:     General: Skin is warm and dry.   Neurological:      General: No focal deficit present.      Mental Status: She is alert and oriented to person, place, and time.      Motor: No weakness.   Psychiatric:         Mood and Affect: Mood normal.         Behavior: Behavior normal.         Thought Content: Thought content normal.        Significant Labs:   Recent Lab Results  (Last 5 results in the past 24 hours)        04/16/25  2113   04/16/25  1705   04/16/25  1552   04/16/25  1538   04/16/25  1417        Albumin       3.6         ALP       83         ALT       15         Anion Gap       10         AST       16         Baso #       0.02         Basophil %       0.3         BILIRUBIN TOTAL       0.3  Comment: For infants and newborns, interpretation of results should be based   on gestational age, weight and in agreement with clinical   observations.    Premature Infant recommended reference ranges:   0-24 hours:  <8.0 mg/dL   24-48 hours: <12.0 mg/dL   3-5 days:    <15.0 mg/dL   6-29 days:   <15.0 mg/dL         BUN       29         Calcium       9.7         Chloride       107          CO2       19         Creatinine       1.2         eGFR       42  Comment: Estimated GFR calculated using the CKD-EPI creatinine (2021) equation.         Eos #       0.13         Eos %       1.7         Estimated Avg Glucose       143         Glucose       116         Gran # (ANC)       5.13         Hematocrit       34.0         Hemoglobin       11.2         Hemoglobin A1C External       6.6  Comment: ADA Screening Guidelines:  5.7-6.4%  Consistent with prediabetes  >=6.5%  Consistent with diabetes    High levels of fetal hemoglobin interfere with the HbA1C  assay. Heterozygous hemoglobin variants (HbS, HgC, etc)do  not significantly interfere with this assay.   However, presence of multiple variants may affect accuracy.         Immature Grans (Abs)       0.03  Comment: Mild elevation in immature granulocytes is non specific and can be seen in a variety of conditions including stress response, acute inflammation, trauma and pregnancy. Correlation with other laboratory and clinical findings is essential.         Immature Granulocytes       0.4         Lactic Acid Level   1.0             Lymph #       1.64         Lymph %       21.7         MCH       28.4         MCHC       32.9         MCV       86         Mono #       0.61         Mono %       8.1         MPV       11.6         Neut %       67.8         nRBC       0         QRS Duration     132     136       OHS QTC Calculation     411     456       Platelet Count       199         POCT Glucose 122               Potassium       4.2         PROTEIN TOTAL       6.2         RBC       3.95         RDW       13.1         Sodium       136         Troponin I High Sensitivity       11         WBC       7.56

## 2025-04-17 NOTE — NURSING TRANSFER
Nursing Transfer Note      4/16/2025   9:53 PM    Nurse giving handoff:ED  Nurse receiving handoff:Yoana     Reason patient is being transferred: HLOC    Transfer To: [id number]    Transfer via stretcher    Transfer with cardiac monitoring    Transported by RN    Transfer Vital Signs:  Blood Pressure:221/93  Heart Rate:49 paced  O2:97  Temperature:98  Respirations:15    Telemetry: Monitor  Order for Tele Monitor? Yes    Additional Lines: PIV, TVP, purwik     Medicines sent: none    Any special needs or follow-up needed: monitor BP    Patient belongings transferred with patient: Yes    Chart send with patient: Yes    Notified: per patient- ED notified friend     Patient reassessed at: 4/16/25 2200    Upon arrival to floor: cardiac monitor applied, patient oriented to room, call bell in reach, and bed in lowest position

## 2025-04-17 NOTE — ASSESSMENT & PLAN NOTE
Appears chronic  Xray left shoulder no acute displaced fracture-dislocation identified  Will monitor  PRN analgesic

## 2025-04-17 NOTE — PLAN OF CARE
[facility] - Cardiac Intensive Care  Initial Discharge Assessment       Primary Care Provider: Gretchen Pena MD    Admission Diagnosis: Bradycardia [R00.1]  Heart block [I45.9]  Shoulder pain, left [M25.512]  Chest pain [R07.9]    Admission Date: 4/16/2025  Expected Discharge Date: 4/19/2025    Transition of Care Barriers: None    Payor: HU[employer] MANAGED MEDICARE / Plan: HU[employer] MEDICARE HMO / Product Type: Capitation /     Extended Emergency Contact Information  Primary Emergency Contact: Keli Estrada   [facility]  Home Phone: [phone]  Mobile Phone: [phone]  Relation: Friend  Secondary Emergency Contact: Teddy Gonzalez  Mobile Phone: [phone]  Relation: Friend    Discharge Plan A: Home  Discharge Plan B: Home      [facility] - [facility] [address]  [address] [facility] [address]  Phone: [phone] Fax: [phone]    [id number] [address] AT [address] [address]  Phone: [phone] Fax: [phone]      Initial Assessment (most recent)       Adult Discharge Assessment - 04/17/25 1447          Discharge Assessment    Assessment Type Discharge Planning Assessment     Confirmed/corrected address, phone number and insurance Yes     Confirmed Demographics Correct on Facesheet     Source of Information patient;other (see comments);health record     Communicated HERLINDA with patient/caregiver Yes     Reason For Admission Bradycardia     People in Home alone     Do you expect to return to your current living situation? Yes     Do you have help at home or someone to help you manage your care at home? Yes     Who are your caregiver(s) and their phone number(s)? Teddy Gonzalez (friend) [phone]     Prior to hospitilization cognitive status: Alert/Oriented     Current cognitive status: Alert/Oriented     Walking or Climbing Stairs Difficulty yes     Walking or  Climbing Stairs ambulation difficulty, requires equipment     Mobility Management walking stick, rolling walker     Dressing/Bathing Difficulty no     Home Accessibility stairs to enter home     Equipment Currently Used at Home cane, straight;walker, rolling     Readmission within 30 days? No     Patient currently being followed by outpatient case management? No     Do you currently have service(s) that help you manage your care at home? No     Do you take prescription medications? Yes     Do you have prescription coverage? Yes     Do you have any problems affording any of your prescribed medications? No     Is the patient taking medications as prescribed? yes     Who is going to help you get home at discharge? Teddy Gonzalez (friend) [phone]     How do you get to doctors appointments? family or friend will provide     Are you on dialysis? No     Do you take coumadin? No     Discharge Plan A Home     Discharge Plan B Home     DME Needed Upon Discharge  none     Discharge Plan discussed with: Patient;Friend     Name(s) and Number(s) Teddy Gonzalez (friend) [phone]     Transition of Care Barriers None                   SW met with pt and friend Teddy at bedside to discuss discharge planning.  Pt lives alone in a second-floor apartment and uses a walking stick and walker for ambulation but is independent with dressing and bathing.  No HH, dialysis, or coumadin.  PCP is Dr Gretchen Pena.  Pt will have transportation and assistance from friends at discharge.  Discharge Plan A and Plan B have been determined by review of patient's clinical status, future medical and therapeutic needs, and coverage/benefits for post-acute care in coordination with multidisciplinary team members.  SW name and ext on whiteboard; discharge planning booklet provided.  Will continue to follow.      Kimberly Brady, JUAN J  [facility]  [phone]

## 2025-04-17 NOTE — ASSESSMENT & PLAN NOTE
Patient has a current diagnosis of hypertensive urgency (without evidence of end organ damage) which is uncontrolled.  Latest blood pressure and vitals reviewed-   Temp:  [97.8 °F (36.6 °C)-98.1 °F (36.7 °C)]   Pulse:  [35-62]   Resp:  [12-25]   BP: (143-228)/()   SpO2:  [93 %-100 %] .   Patient currently off IV antihypertensives.   Home meds for hypertension were reviewed and noted below.   Hypertension Medications              enalapril (VASOTEC) 20 MG tablet Take 1 tablet by mouth 2 (two) times daily.    isosorbide mononitrate (IMDUR) 120 MG 24 hr tablet     metoprolol succinate (TOPROL-XL) 50 MG 24 hr tablet Take 1 tablet by mouth once daily.    metoprolol tartrate (LOPRESSOR) 50 MG tablet Take 1 tablet by mouth.    nitroGLYCERIN (NITROSTAT) 0.3 MG SL tablet Place 1 tablet (0.3 mg total) under the tongue every 5 (five) minutes as needed for Chest pain.            Medication adjustment for hospital antihypertensives is as follows-   Hold home Toprol given complete heart block  Lisinopril in place of home enalapril  Received one dose of Hydralazine 5 mg  Better pain control for left should joint pain  If no recovery then will consider starting IV antihypertensive    Will aim for controlled BP reduction by medications noted above. Monitor and mitigate end organ damage as indicated.

## 2025-04-17 NOTE — PLAN OF CARE
Problem: Adult Inpatient Plan of Care  Goal: Plan of Care Review  4/17/2025 1650 by Jacqueline Foote RN  Outcome: Progressing  4/17/2025 1551 by Jacqueline Foote RN  Outcome: Progressing  Goal: Patient-Specific Goal (Individualized)  4/17/2025 1650 by Jacqueline Foote RN  Outcome: Progressing  4/17/2025 1551 by Jacqueline Foote RN  Outcome: Progressing  Goal: Absence of Hospital-Acquired Illness or Injury  4/17/2025 1650 by Jacqueline Foote RN  Outcome: Progressing  4/17/2025 1551 by Jacqueline Foote RN  Outcome: Progressing  Goal: Optimal Comfort and Wellbeing  4/17/2025 1650 by Jacqueline Foote RN  Outcome: Progressing  4/17/2025 1551 by Jacqueline Foote RN  Outcome: Progressing  Goal: Readiness for Transition of Care  4/17/2025 1650 by Jacqueline Foote RN  Outcome: Progressing  4/17/2025 1551 by Jacqueline Foote RN  Outcome: Progressing     Problem: Diabetes Comorbidity  Goal: Blood Glucose Level Within Targeted Range  4/17/2025 1650 by Jacqueline Foote RN  Outcome: Progressing  4/17/2025 1551 by Jacqueline Foote RN  Outcome: Progressing     Problem: Fall Injury Risk  Goal: Absence of Fall and Fall-Related Injury  4/17/2025 1650 by Jacqueline Foote RN  Outcome: Progressing  4/17/2025 1551 by Jacqueline Foote RN  Outcome: Progressing     Problem: Skin Injury Risk Increased  Goal: Skin Health and Integrity  4/17/2025 1650 by Jacqueline Foote RN  Outcome: Progressing  4/17/2025 1551 by Jacqueline Foote RN  Outcome: Progressing     Problem: Wound  Goal: Optimal Coping  4/17/2025 1650 by Jacqueline Foote RN  Outcome: Progressing  4/17/2025 1551 by Jacqueline Foote RN  Outcome: Progressing  Goal: Optimal Functional Ability  4/17/2025 1650 by Jacqueline Foote RN  Outcome: Progressing  4/17/2025 1551 by Jacqueline Foote RN  Outcome: Progressing  Goal: Absence of Infection Signs and Symptoms  4/17/2025 1650 by Jacqueline Foote RN  Outcome: Progressing  4/17/2025 1551 by Jacqueline Foote RN  Outcome: Progressing  Goal: Improved Oral  Intake  4/17/2025 1650 by Jacqueline Foote RN  Outcome: Progressing  4/17/2025 1551 by Jacqueline Foote RN  Outcome: Progressing  Goal: Optimal Pain Control and Function  4/17/2025 1650 by Jacqueline Foote RN  Outcome: Progressing  4/17/2025 1551 by Jacqueline Foote RN  Outcome: Progressing  Goal: Skin Health and Integrity  4/17/2025 1650 by Jacqueline Foote RN  Outcome: Progressing  4/17/2025 1551 by Jacqueline Foote RN  Outcome: Progressing  Goal: Optimal Wound Healing  4/17/2025 1650 by Jacqueline Foote RN  Outcome: Progressing  4/17/2025 1551 by Jacqueline Foote RN  Outcome: Progressing

## 2025-04-17 NOTE — ASSESSMENT & PLAN NOTE
89 y/o W PMH of CAD s/p PCI (9 yrs ago), Ischemic cardiomyopathy, Left bundle branch block presented to ED with left shoulder pain. She reports no  chest pain, palpitations or lightheadedness but reports dizziness for past 6 weeks. In ED EKG  showed complete heart block, TVP was placed. Electrophysiology has been consulted for evaluation of complete heart block and need for Permanent Pacemaker Implant    Echo 2019 with LVEF 69%  EKG 4/16/25 complete heart block  S/p TVP, threshold 0.2, set HR 50, output 10  Get TTE  NPO MN for possible PPM implant  She is chronically on toprol succinate 50mg. Last taken on 4/15/2025 at about 3 pm. Continue holding.                     -

## 2025-04-17 NOTE — EICU
Virtual ICU Quality Rounds    Admit Date: 4/16/2025  Hospital Day: 1    ICU Day: 13h    24H Vital Sign Range:  Temp:  [97.4 °F (36.3 °C)-98.1 °F (36.7 °C)]   Pulse:  [35-74]   Resp:  [12-33]   BP: (133-228)/()   SpO2:  [93 %-100 %]     VICU Surveillance Screening    Daily review of  line necessity(optional): Central line(s) in place    Central line type (optional): Cordis    LDA reconciliation : Yes

## 2025-04-17 NOTE — SUBJECTIVE & OBJECTIVE
Past Medical History:   Diagnosis Date    Coronary artery disease     Diabetes mellitus type I     Hyperlipidemia     Hypertension        Past Surgical History:   Procedure Laterality Date    ADENOIDECTOMY      CATARACT EXTRACTION, BILATERAL      CORONARY STENT PLACEMENT  2016    dr. ruiz       Review of patient's allergies indicates:   Allergen Reactions    Amoxicillin     Bactrim [sulfamethoxazole-trimethoprim]        No current facility-administered medications on file prior to encounter.     Current Outpatient Medications on File Prior to Encounter   Medication Sig    artificial tear,dxtrn-hpm-gly, (GENTEAL TEARS MODERATE) 0.1-0.3-0.2 % Drop 1 drop.    ascorbic acid, vitamin C, (VITAMIN C) 100 MG tablet Take 500 mg by mouth once daily.    aspirin (ECOTRIN) 81 MG EC tablet Take 81 mg by mouth once daily.    atorvastatin (LIPITOR) 40 MG tablet Take 1 tablet (40 mg total) by mouth once daily.    blood sugar diagnostic Strp Check 2x/day    budesonide (PULMICORT) 0.5 mg/2 mL nebulizer solution SMARTSI Milliliter(s) Via Nebulizer Daily    clopidogreL (PLAVIX) 75 mg tablet TAKE 1 TABLET ONE TIME DAILY    colchicine (COLCRYS) 0.6 mg tablet Take 2 tablets by mouth, then one hour later take 1 additional tablet    enalapril (VASOTEC) 20 MG tablet Take 1 tablet by mouth 2 (two) times daily.    flash glucose scanning reader (FREESTYLE COLT 14 DAY READER) Misc 1 Box by Misc.(Non-Drug; Combo Route) route Daily.    flash glucose sensor (FREESTYLE COLT 14 DAY SENSOR) Kit 1 kit by Misc.(Non-Drug; Combo Route) route Daily.    fluticasone propionate (FLONASE) 50 mcg/actuation nasal spray 1 spray (50 mcg total) by Each Nostril route 2 (two) times daily.    glimepiride (AMARYL) 1 MG tablet Take 1 tablet (1 mg total) by mouth before breakfast.    isosorbide mononitrate (IMDUR) 120 MG 24 hr tablet     levocetirizine (XYZAL) 5 MG tablet Take 1 tablet by mouth every evening.    meclizine (ANTIVERT) 25 mg tablet Take 25 mg  by mouth 3 (three) times daily as needed.    metoprolol succinate (TOPROL-XL) 50 MG 24 hr tablet Take 1 tablet by mouth once daily.    metoprolol tartrate (LOPRESSOR) 50 MG tablet Take 1 tablet by mouth.    nitroGLYCERIN (NITROSTAT) 0.3 MG SL tablet Place 1 tablet (0.3 mg total) under the tongue every 5 (five) minutes as needed for Chest pain.    PREVIDENT 5000 ENAMEL PROTECT 1.1-5 % Pste     senna (SENOKOT) 8.6 mg tablet Take 8.6 mg by mouth.    senna-docusate 8.6-50 mg (PERICOLACE) 8.6-50 mg per tablet Take 1 tablet by mouth once daily.     Family History    None       Tobacco Use    Smoking status: Never    Smokeless tobacco: Never   Substance and Sexual Activity    Alcohol use: Not Currently    Drug use: Never    Sexual activity: Never     Review of Systems   Constitutional: Negative for chills and fever.   HENT:  Negative for congestion.    Eyes:  Negative for blurred vision and visual disturbance.   Cardiovascular:  Positive for dyspnea on exertion. Negative for chest pain, irregular heartbeat, leg swelling, near-syncope, orthopnea, palpitations, paroxysmal nocturnal dyspnea and syncope.   Respiratory:  Positive for shortness of breath. Negative for cough, sputum production and wheezing.    Skin:  Negative for color change.   Musculoskeletal:  Positive for joint pain. Negative for arthritis and muscle weakness.        Left shoulder joint pain   Gastrointestinal:  Negative for bloating, abdominal pain, change in bowel habit, nausea and vomiting.   Genitourinary:  Negative for dysuria.   Neurological:  Negative for dizziness and light-headedness.   Psychiatric/Behavioral:  The patient is not nervous/anxious.      Objective:     Vital Signs (Most Recent):  Temp: 97.8 °F (36.6 °C) (04/16/25 2315)  Pulse: (!) 50 (04/17/25 0000)  Resp: 18 (04/17/25 0000)  BP: (!) 196/98 (04/17/25 0000)  SpO2: (!) 94 % (04/17/25 0000) Vital Signs (24h Range):  Temp:  [97.8 °F (36.6 °C)-98.1 °F (36.7 °C)] 97.8 °F (36.6 °C)  Pulse:   [35-62] 50  Resp:  [12-32] 18  SpO2:  [93 %-100 %] 94 %  BP: (143-228)/() 196/98     Weight: 57.5 kg (126 lb 12.2 oz)  Body mass index is 21.09 kg/m².    SpO2: (!) 94 %       No intake or output data in the 24 hours ending 04/17/25 0155    Lines/Drains/Airways       Central Venous Catheter Line  Duration                  Percutaneous Central Line - Cordis 04/16/25 2000 Internal Jugular Right <1 day              Drain  Duration             Female External Urinary Catheter w/ Suction 04/16/25 1801 <1 day              Line  Duration                  Pacer Wires 04/16/25 2000 <1 day              Peripheral Intravenous Line  Duration                  Peripheral IV - Single Lumen 04/16/25 1547 20 G Anterior;Right Forearm <1 day         Peripheral IV - Single Lumen 04/16/25 2119 20 G Anterior;Proximal;Right Forearm <1 day                     Physical Exam  Constitutional:       General: She is not in acute distress.     Appearance: Normal appearance. She is ill-appearing.   HENT:      Head: Normocephalic and atraumatic.      Mouth/Throat:      Mouth: Mucous membranes are moist.   Eyes:      Extraocular Movements: Extraocular movements intact.      Pupils: Pupils are equal, round, and reactive to light.   Cardiovascular:      Rate and Rhythm: Bradycardia present. Rhythm irregular.      Heart sounds: No murmur heard.     No friction rub. No gallop.   Pulmonary:      Effort: No respiratory distress.      Breath sounds: No rales.   Abdominal:      General: Bowel sounds are normal. There is no distension.      Tenderness: There is no abdominal tenderness.   Musculoskeletal:         General: Tenderness present.      Cervical back: No rigidity.      Right lower leg: No edema.      Left lower leg: No edema.      Comments: Lt shoulder joint   Skin:     General: Skin is warm.      Capillary Refill: Capillary refill takes less than 2 seconds.   Neurological:      General: No focal deficit present.      Mental Status: She is  "alert and oriented to person, place, and time.   Psychiatric:         Mood and Affect: Mood normal.         Behavior: Behavior normal.          Significant Labs: ABG: No results for input(s): "PH", "PCO2", "HCO3", "POCSATURATED", "BE" in the last 48 hours., Blood Culture: No results for input(s): "LABBLOO" in the last 48 hours., BMP:   Recent Labs   Lab 04/16/25  1538      K 4.2      CO2 19*   BUN 29   CREATININE 1.2   CALCIUM 9.7   , CMP   Recent Labs   Lab 04/16/25  1538      K 4.2      CO2 19*   BUN 29   CREATININE 1.2   CALCIUM 9.7   ALBUMIN 3.6   BILITOT 0.3   ALKPHOS 83   AST 16   ALT 15   ANIONGAP 10   , CBC   Recent Labs   Lab 04/16/25  1538   WBC 7.56   HGB 11.2*   HCT 34.0*      , INR No results for input(s): "INR", "PROTIME" in the last 48 hours., Lipid Panel No results for input(s): "CHOL", "HDL", "LDLCALC", "TRIG", "CHOLHDL" in the last 48 hours., and Troponin   Recent Labs   Lab 04/16/25  1538   TROPONINIHS 11       Significant Imaging: Echocardiogram: 2D echo with color flow doppler: No results found for this or any previous visit.  "

## 2025-04-17 NOTE — TRANSFER OF CARE
"Anesthesia Transfer of Care Note    Patient: Tonya Marquez    Procedure(s) Performed: Procedure(s) (LRB):  INSERTION, CARDIAC PACEMAKER, LEADLESS (N/A)    Patient location: ICU    Anesthesia Type: general    Transport from OR: Transported from OR on 6-10 L/min O2 by face mask with adequate spontaneous ventilation. Continuous ECG monitoring in transport. Continuous SpO2 monitoring in transport    Post pain: adequate analgesia    Post assessment: no apparent anesthetic complications    Post vital signs: stable    Level of consciousness: lethargic    Nausea/Vomiting: no nausea/vomiting    Complications: none    Transfer of care protocol was followed      Last vitals: Visit Vitals  BP (!) 164/74 (BP Location: Right arm, Patient Position: Lying)   Pulse (!) 49   Temp 36.3 °C (97.4 °F) (Oral)   Resp 16   Ht 5' 5" (1.651 m)   Wt 57 kg (125 lb 10.6 oz)   SpO2 (!) 94%   Breastfeeding No   BMI 20.91 kg/m²     "

## 2025-04-17 NOTE — HPI
89 y/o W PMH of CAD s/p PCI (9 yrs ago), Ischemic cardiomyopathy, Left bundle branch block presented to ED with left shoulder pain. She reports no  chest pain, palpitations or lightheadedness but reports dizziness for past 6 weeks. She had an episode of fall 6 weeks ago on stairs due to dizziness. She also claims shortness of breath with exertion. He BP at home around 130/80s. She follows with Dr. Estrella at [facility] for cardiology and last seen in February for dizziness and BP in 180s. She is chronically on toprol succinate 50mg. Last taken on 4/15/2025 at about 3 pm. Chart review shows last Echo in 2019 wiith EF 69%, Lexiscan in 2021 shows mix apical defect with ST depression > 2 mm. In ED EKG  showed complete heart block and Cardiology has been consulted to evaluate complete heart block. In ED TVP was placed and admitted to CCU for further management. Electrophysiology has been consulted for evaluation of complete heart block and need for Permanent Pacemaker Implant

## 2025-04-17 NOTE — ASSESSMENT & PLAN NOTE
89 y/o W PMH of CAD s/p PCI (9 yrs ago), Ischemic cardiomyopathy, Left bundle branch block presented to ED with left shoulder pain.  In ED EKG  showed complete heart block and Cardiology has been consulted to evaluate complete heart block. In ED TVP was placed and getting admitted to CCU for further management.    Echo 2019 with LVEF 69%  Lexiscan  2021 shows mix apical defect with ST depression > 2 mm.  EKG 4/16/25 complete heart block  S/p TVP, threshold 0.2, set HR 50, output 10  Get TTE  EP consult for PPM consideration  NPO MN

## 2025-04-17 NOTE — HPI
89 y/o W PMH of CAD s/p PCI (9 yrs ago), Ischemic cardiomyopathy, Left bundle branch block presented to ED with left shoulder pain. She reports no  chest pain, palpitations or lightheadedness but reports dizziness for past 6 weeks. She had an episode of fall 6 weeks ago on stairs due to dizziness. She also claims shortness of breath with exertion. He BP at home around 130/80s. She follows with Dr. Estrella at [facility] for cardiology and last seen in February for dizziness and BP in 180s. Chart review shows last Echo in 2019 wiith EF 69%, Lexiscan in 2021 shows mix apical defect with ST depression > 2 mm. In ED EKG  showed complete heart block and Cardiology has been consulted to evaluate complete heart block. In ED TVP was placed and getting admitted to CCU for further management.

## 2025-04-17 NOTE — CONSULTS
"[facility]  Cardiac Electrophysiology  Consult Note    Admission Date: 4/16/2025  Code Status: Full Code   Attending Provider: No att. providers found  Consulting Provider: Daniela Mariee MD  Principal Problem:<principal problem not specified>    Inpatient consult to Electrophysiology  Consult performed by: Daniela Mckeon MD  Consult ordered by: Liu Mckinnon MD        Subjective:     Chief Complaint:  left shoulder pain     HPI:   Ms. Marquez is a pleasant 89 y/o W  with a PMH  CAD s/p PCI ISA mLAD, ICM, LBBB (since 2017), HTN, R. Maxillary fracture who  presented to the ED with left shoulder pain. While awaiting work up rhythm change noted on telemetry with ECG showing rates in the 38 with complete heart block. ECG on arrival SR with old LBBB as per outpatient cardiologist note as we do not have prior ECGs. Patient hypertensive and asymptomatic. However admits recent decreased memory after a fall with closed head injury, facial fracture, and thumb fracture 10/16/2024. ED note reviewed "patient states that she was walking upstairs in the morning after an exercise class, she fell backwards, hit her face on the right side and right hand, she had bruises and swelling of the right cheek, laceration to the face and limited range of motion to the right thumb". [place] [facility] [place] KEENAN Byers patient had denied any syncopal event with the fall. Per note she did not seem to have orthostatic type symptoms and her dizziness was likely related to her closed head injury.   She is AAOx3 and fully independent. Retired cardiac nurse. Would like to go back to [place] from she is from. No family present and no POA.           Past Medical History:   Diagnosis Date    Coronary artery disease     Diabetes mellitus type I     Hyperlipidemia     Hypertension        Past Surgical History:   Procedure Laterality Date    ADENOIDECTOMY      CATARACT EXTRACTION, BILATERAL      " CORONARY STENT PLACEMENT  2016    dr. ruiz       Review of patient's allergies indicates:   Allergen Reactions    Amoxicillin     Bactrim [sulfamethoxazole-trimethoprim]        No current facility-administered medications on file prior to encounter.     Current Outpatient Medications on File Prior to Encounter   Medication Sig    artificial tear,dxtrn-hpm-gly, (GENTEAL TEARS MODERATE) 0.1-0.3-0.2 % Drop 1 drop.    ascorbic acid, vitamin C, (VITAMIN C) 100 MG tablet Take 500 mg by mouth once daily.    aspirin (ECOTRIN) 81 MG EC tablet Take 81 mg by mouth once daily.    atorvastatin (LIPITOR) 40 MG tablet Take 1 tablet (40 mg total) by mouth once daily.    blood sugar diagnostic Strp Check 2x/day    budesonide (PULMICORT) 0.5 mg/2 mL nebulizer solution SMARTSI Milliliter(s) Via Nebulizer Daily    clopidogreL (PLAVIX) 75 mg tablet TAKE 1 TABLET ONE TIME DAILY    colchicine (COLCRYS) 0.6 mg tablet Take 2 tablets by mouth, then one hour later take 1 additional tablet    enalapril (VASOTEC) 20 MG tablet Take 1 tablet by mouth 2 (two) times daily.    flash glucose scanning reader (FREESTYLE COLT 14 DAY READER) Misc 1 Box by Misc.(Non-Drug; Combo Route) route Daily.    flash glucose sensor (FREESTYLE COLT 14 DAY SENSOR) Kit 1 kit by Misc.(Non-Drug; Combo Route) route Daily.    fluticasone propionate (FLONASE) 50 mcg/actuation nasal spray 1 spray (50 mcg total) by Each Nostril route 2 (two) times daily.    glimepiride (AMARYL) 1 MG tablet Take 1 tablet (1 mg total) by mouth before breakfast.    isosorbide mononitrate (IMDUR) 120 MG 24 hr tablet     levocetirizine (XYZAL) 5 MG tablet Take 1 tablet by mouth every evening.    meclizine (ANTIVERT) 25 mg tablet Take 25 mg by mouth 3 (three) times daily as needed.    metoprolol succinate (TOPROL-XL) 50 MG 24 hr tablet Take 1 tablet by mouth once daily.    metoprolol tartrate (LOPRESSOR) 50 MG tablet Take 1 tablet by mouth.    nitroGLYCERIN (NITROSTAT) 0.3 MG SL  tablet Place 1 tablet (0.3 mg total) under the tongue every 5 (five) minutes as needed for Chest pain.    PREVIDENT 5000 ENAMEL PROTECT 1.1-5 % Pste     senna (SENOKOT) 8.6 mg tablet Take 8.6 mg by mouth.    senna-docusate 8.6-50 mg (PERICOLACE) 8.6-50 mg per tablet Take 1 tablet by mouth once daily.     Family History    None       Tobacco Use    Smoking status: Never    Smokeless tobacco: Never   Substance and Sexual Activity    Alcohol use: Not Currently    Drug use: Never    Sexual activity: Never     Review of Systems   Constitutional: Negative for chills, fever and malaise/fatigue.   HENT:  Negative for congestion and sore throat.    Cardiovascular:  Negative for chest pain, dyspnea on exertion, irregular heartbeat, near-syncope, palpitations and syncope.   Gastrointestinal:  Negative for nausea and vomiting.   Neurological:  Negative for dizziness, headaches, light-headedness and loss of balance.   Psychiatric/Behavioral:  Positive for memory loss. Negative for altered mental status and hallucinations. The patient is not nervous/anxious.    Objective:     Vital Signs (Most Recent):  Temp: 97.8 °F (36.6 °C) (04/16/25 2315)  Pulse: (!) 50 (04/17/25 0000)  Resp: 18 (04/17/25 0000)  BP: (!) 196/98 (04/17/25 0000)  SpO2: (!) 94 % (04/17/25 0000) Vital Signs (24h Range):  Temp:  [97.8 °F (36.6 °C)-98.1 °F (36.7 °C)] 97.8 °F (36.6 °C)  Pulse:  [35-62] 50  Resp:  [12-32] 18  SpO2:  [93 %-100 %] 94 %  BP: (143-228)/() 196/98       Weight: 57.5 kg (126 lb 12.2 oz)  Body mass index is 21.09 kg/m².    SpO2: (!) 94 %        Physical Exam  Vitals and nursing note reviewed.   Constitutional:       General: She is not in acute distress.     Appearance: Normal appearance. She is normal weight. She is not ill-appearing, toxic-appearing or diaphoretic.   HENT:      Head:      Comments: R. Facial asymmetry (from prior fracture)     Nose: Nose normal. No rhinorrhea.      Mouth/Throat:      Mouth: Mucous membranes are moist.       Pharynx: Oropharynx is clear.   Eyes:      General: No scleral icterus.     Extraocular Movements: Extraocular movements intact.   Cardiovascular:      Rate and Rhythm: Bradycardia present.      Heart sounds: No murmur heard.     Comments: CHB  Pulmonary:      Effort: Pulmonary effort is normal. No respiratory distress.      Breath sounds: Normal breath sounds.   Abdominal:      General: Abdomen is flat.      Palpations: Abdomen is soft.   Musculoskeletal:         General: No swelling or tenderness. Normal range of motion.      Cervical back: Normal range of motion and neck supple.   Skin:     General: Skin is warm and dry.   Neurological:      General: No focal deficit present.      Mental Status: She is alert and oriented to person, place, and time.      Motor: No weakness.   Psychiatric:         Mood and Affect: Mood normal.         Behavior: Behavior normal.         Thought Content: Thought content normal.        Significant Labs:   Recent Lab Results  (Last 5 results in the past 24 hours)        04/16/25  2113   04/16/25  1705   04/16/25  1552   04/16/25  1538   04/16/25  1417        Albumin       3.6         ALP       83         ALT       15         Anion Gap       10         AST       16         Baso #       0.02         Basophil %       0.3         BILIRUBIN TOTAL       0.3  Comment: For infants and newborns, interpretation of results should be based   on gestational age, weight and in agreement with clinical   observations.    Premature Infant recommended reference ranges:   0-24 hours:  <8.0 mg/dL   24-48 hours: <12.0 mg/dL   3-5 days:    <15.0 mg/dL   6-29 days:   <15.0 mg/dL         BUN       29         Calcium       9.7         Chloride       107         CO2       19         Creatinine       1.2         eGFR       42  Comment: Estimated GFR calculated using the CKD-EPI creatinine (2021) equation.         Eos #       0.13         Eos %       1.7         Estimated Avg Glucose       143          Glucose       116         Gran # (ANC)       5.13         Hematocrit       34.0         Hemoglobin       11.2         Hemoglobin A1C External       6.6  Comment: ADA Screening Guidelines:  5.7-6.4%  Consistent with prediabetes  >=6.5%  Consistent with diabetes    High levels of fetal hemoglobin interfere with the HbA1C  assay. Heterozygous hemoglobin variants (HbS, HgC, etc)do  not significantly interfere with this assay.   However, presence of multiple variants may affect accuracy.         Immature Grans (Abs)       0.03  Comment: Mild elevation in immature granulocytes is non specific and can be seen in a variety of conditions including stress response, acute inflammation, trauma and pregnancy. Correlation with other laboratory and clinical findings is essential.         Immature Granulocytes       0.4         Lactic Acid Level   1.0             Lymph #       1.64         Lymph %       21.7         MCH       28.4         MCHC       32.9         MCV       86         Mono #       0.61         Mono %       8.1         MPV       11.6         Neut %       67.8         nRBC       0         QRS Duration     132     136       OHS QTC Calculation     411     456       Platelet Count       199         POCT Glucose 122               Potassium       4.2         PROTEIN TOTAL       6.2         RBC       3.95         RDW       13.1         Sodium       136         Troponin I High Sensitivity       11         WBC       7.56                                            Assessment and Plan:     CHB (complete heart block)  Ms. Marquez is a pleasant 89 y/o W  with a PMH  CAD s/p PCI ISA mLAD, ICM, LBBB (since 2017), HTN, R. Maxillary fracture, DM2, CKD3  who  presented to the ED with left shoulder pain. While awaiting work up rhythm change noted on telemetry with ECG showing rates in the 38 with complete heart block. ECGs reviewed with EP fellow.     Recs:   - If patient ultimately decides to proceed with all therapies recommend  admission to CCU, TVP placement and most likely PPM.   - She is chronically on toprol succinate 50mg. Last taken on 4/15/2025 at about 3 pm. Continue holding.       Discussed with ED team, Cardiology ML fellow and CCU fellow.        Thank you for your consult Please contact us if you have any additional questions.        Daniela Mariee MD  Cardiac Electrophysiology  [facility]

## 2025-04-17 NOTE — ASSESSMENT & PLAN NOTE
Ms. Marquez is a pleasant 89 y/o W  with a PMH  CAD s/p PCI ISA mLAD, ICM, LBBB (since 2017), HTN, R. Maxillary fracture, DM2, CKD3  who  presented to the ED with left shoulder pain. While awaiting work up rhythm change noted on telemetry with ECG showing rates in the 38 with complete heart block. ECGs reviewed with EP fellow.     Recs:   - If patient ultimately decides to proceed with all therapies recommend admission to CCU, TVP placement and most likely PPM.   - She is chronically on toprol succinate 50mg. Last taken on 4/15/2025 at about 3 pm. Continue holding.       Discussed with ED team, Cardiology ML fellow and CCU fellow.

## 2025-04-18 VITALS
SYSTOLIC BLOOD PRESSURE: 131 MMHG | TEMPERATURE: 98 F | WEIGHT: 125.69 LBS | DIASTOLIC BLOOD PRESSURE: 63 MMHG | HEIGHT: 65 IN | RESPIRATION RATE: 20 BRPM | HEART RATE: 67 BPM | OXYGEN SATURATION: 99 % | BODY MASS INDEX: 20.94 KG/M2

## 2025-04-18 LAB
ABSOLUTE EOSINOPHIL (OHS): 0.1 K/UL
ABSOLUTE MONOCYTE (OHS): 0.57 K/UL (ref 0.3–1)
ABSOLUTE NEUTROPHIL COUNT (OHS): 6.12 K/UL (ref 1.8–7.7)
ALBUMIN SERPL BCP-MCNC: 3.2 G/DL (ref 3.5–5.2)
ALP SERPL-CCNC: 71 UNIT/L (ref 40–150)
ALT SERPL W/O P-5'-P-CCNC: 12 UNIT/L (ref 10–44)
ANION GAP (OHS): 6 MMOL/L (ref 8–16)
AST SERPL-CCNC: 14 UNIT/L (ref 11–45)
BASOPHILS # BLD AUTO: 0.02 K/UL
BASOPHILS NFR BLD AUTO: 0.3 %
BILIRUB SERPL-MCNC: 0.4 MG/DL (ref 0.1–1)
BUN SERPL-MCNC: 33 MG/DL (ref 10–30)
CALCIUM SERPL-MCNC: 9 MG/DL (ref 8.7–10.5)
CHLORIDE SERPL-SCNC: 106 MMOL/L (ref 95–110)
CO2 SERPL-SCNC: 23 MMOL/L (ref 23–29)
CREAT SERPL-MCNC: 1.5 MG/DL (ref 0.5–1.4)
ERYTHROCYTE [DISTWIDTH] IN BLOOD BY AUTOMATED COUNT: 13.2 % (ref 11.5–14.5)
GFR SERPLBLD CREATININE-BSD FMLA CKD-EPI: 32 ML/MIN/1.73/M2
GLUCOSE SERPL-MCNC: 163 MG/DL (ref 70–110)
HCT VFR BLD AUTO: 31.3 % (ref 37–48.5)
HGB BLD-MCNC: 10.3 GM/DL (ref 12–16)
IMM GRANULOCYTES # BLD AUTO: 0.05 K/UL (ref 0–0.04)
IMM GRANULOCYTES NFR BLD AUTO: 0.6 % (ref 0–0.5)
LYMPHOCYTES # BLD AUTO: 0.88 K/UL (ref 1–4.8)
MAGNESIUM SERPL-MCNC: 1.7 MG/DL (ref 1.6–2.6)
MCH RBC QN AUTO: 28.9 PG (ref 27–31)
MCHC RBC AUTO-ENTMCNC: 32.9 G/DL (ref 32–36)
MCV RBC AUTO: 88 FL (ref 82–98)
NUCLEATED RBC (/100WBC) (OHS): 0 /100 WBC
PHOSPHATE SERPL-MCNC: 3.6 MG/DL (ref 2.7–4.5)
PLATELET # BLD AUTO: 157 K/UL (ref 150–450)
PMV BLD AUTO: 11.6 FL (ref 9.2–12.9)
POCT GLUCOSE: 159 MG/DL (ref 70–110)
POCT GLUCOSE: 170 MG/DL (ref 70–110)
POCT GLUCOSE: 223 MG/DL (ref 70–110)
POTASSIUM SERPL-SCNC: 4.1 MMOL/L (ref 3.5–5.1)
PROT SERPL-MCNC: 5.7 GM/DL (ref 6–8.4)
RBC # BLD AUTO: 3.57 M/UL (ref 4–5.4)
RELATIVE EOSINOPHIL (OHS): 1.3 %
RELATIVE LYMPHOCYTE (OHS): 11.4 % (ref 18–48)
RELATIVE MONOCYTE (OHS): 7.4 % (ref 4–15)
RELATIVE NEUTROPHIL (OHS): 79 % (ref 38–73)
SODIUM SERPL-SCNC: 135 MMOL/L (ref 136–145)
WBC # BLD AUTO: 7.74 K/UL (ref 3.9–12.7)

## 2025-04-18 PROCEDURE — 83735 ASSAY OF MAGNESIUM: CPT | Mod: HCNC | Performed by: STUDENT IN AN ORGANIZED HEALTH CARE EDUCATION/TRAINING PROGRAM

## 2025-04-18 PROCEDURE — 1111F DSCHRG MED/CURRENT MED MERGE: CPT | Mod: HCNC,CPTII,GC, | Performed by: INTERNAL MEDICINE

## 2025-04-18 PROCEDURE — 85025 COMPLETE CBC W/AUTO DIFF WBC: CPT | Mod: HCNC | Performed by: STUDENT IN AN ORGANIZED HEALTH CARE EDUCATION/TRAINING PROGRAM

## 2025-04-18 PROCEDURE — 84100 ASSAY OF PHOSPHORUS: CPT | Mod: HCNC | Performed by: STUDENT IN AN ORGANIZED HEALTH CARE EDUCATION/TRAINING PROGRAM

## 2025-04-18 PROCEDURE — 63600175 PHARM REV CODE 636 W HCPCS: Mod: HCNC | Performed by: STUDENT IN AN ORGANIZED HEALTH CARE EDUCATION/TRAINING PROGRAM

## 2025-04-18 PROCEDURE — 25000003 PHARM REV CODE 250: Mod: HCNC | Performed by: STUDENT IN AN ORGANIZED HEALTH CARE EDUCATION/TRAINING PROGRAM

## 2025-04-18 PROCEDURE — 25000003 PHARM REV CODE 250: Mod: HCNC | Performed by: INTERNAL MEDICINE

## 2025-04-18 PROCEDURE — 84460 ALANINE AMINO (ALT) (SGPT): CPT | Mod: HCNC | Performed by: STUDENT IN AN ORGANIZED HEALTH CARE EDUCATION/TRAINING PROGRAM

## 2025-04-18 PROCEDURE — 99239 HOSP IP/OBS DSCHRG MGMT >30: CPT | Mod: HCNC,GC,, | Performed by: INTERNAL MEDICINE

## 2025-04-18 PROCEDURE — 94761 N-INVAS EAR/PLS OXIMETRY MLT: CPT | Mod: HCNC

## 2025-04-18 RX ORDER — LISINOPRIL 20 MG/1
20 TABLET ORAL ONCE
Status: DISCONTINUED | OUTPATIENT
Start: 2025-04-18 | End: 2025-04-18

## 2025-04-18 RX ORDER — CARVEDILOL 3.12 MG/1
3.12 TABLET ORAL 2 TIMES DAILY
Status: DISCONTINUED | OUTPATIENT
Start: 2025-04-18 | End: 2025-04-18 | Stop reason: HOSPADM

## 2025-04-18 RX ORDER — LISINOPRIL 20 MG/1
40 TABLET ORAL DAILY
Status: DISCONTINUED | OUTPATIENT
Start: 2025-04-19 | End: 2025-04-18 | Stop reason: HOSPADM

## 2025-04-18 RX ADMIN — CARVEDILOL 3.12 MG: 3.12 TABLET, FILM COATED ORAL at 10:04

## 2025-04-18 RX ADMIN — LISINOPRIL 20 MG: 20 TABLET ORAL at 08:04

## 2025-04-18 RX ADMIN — AMLODIPINE BESYLATE 10 MG: 10 TABLET ORAL at 08:04

## 2025-04-18 RX ADMIN — ASPIRIN 81 MG: 81 TABLET, COATED ORAL at 08:04

## 2025-04-18 RX ADMIN — FAMOTIDINE 20 MG: 20 TABLET, FILM COATED ORAL at 08:04

## 2025-04-18 RX ADMIN — ISOSORBIDE MONONITRATE 120 MG: 60 TABLET, EXTENDED RELEASE ORAL at 08:04

## 2025-04-18 RX ADMIN — HYDROCODONE BITARTRATE AND ACETAMINOPHEN 1 TABLET: 5; 325 TABLET ORAL at 10:04

## 2025-04-18 RX ADMIN — INSULIN ASPART 2 UNITS: 100 INJECTION, SOLUTION INTRAVENOUS; SUBCUTANEOUS at 11:04

## 2025-04-18 RX ADMIN — ATORVASTATIN CALCIUM 40 MG: 40 TABLET, FILM COATED ORAL at 08:04

## 2025-04-18 NOTE — EICU
Virtual ICU Quality Rounds    Admit Date: 4/16/2025  Hospital Day: 2    ICU Day: 1d 12h    24H Vital Sign Range:  Temp:  [98 °F (36.7 °C)-98.8 °F (37.1 °C)]   Pulse:  []   Resp:  [9-38]   BP: (106-183)/(54-91)   SpO2:  [92 %-100 %]     VICU Surveillance Screening    Central line type (optional): Cordis  LDA reconciliation : Yes    Central line best practices : Consider removal if patient has no central line indications for over 24hrs

## 2025-04-18 NOTE — DISCHARGE INSTRUCTIONS
Please follow up with EP.  Return to the emergency department if you experience shortness of breath, chest pain, lightheadedness.

## 2025-04-18 NOTE — HOSPITAL COURSE
Admitted for CHB. TVP was placed on arrival. EP consulted for pacemaker placement - Micra was placed 4/17 with no immediate complications, pt tolerated procedure well.     She was medically cleared for discharge with close follow up with EP.

## 2025-04-18 NOTE — PLAN OF CARE
[facility]  Discharge Final Note    Primary Care Provider: Gretchen Pena MD    Expected Discharge Date: 4/18/2025    Patient discharged to home via family personal  transportation.     Patient's bedside nurse and family  notified of the above.    Discharge Plan A and Plan B have been determined by review of patient's clinical status, future medical and therapeutic needs, and coverage/benefits for post-acute care in coordination with multidisciplinary team members.        Final Discharge Note (most recent)       Final Note - 04/18/25 1017          Final Note    Assessment Type Final Discharge Note (P)      Anticipated Discharge Disposition Home or Self Care (P)      Hospital Resources/Appts/Education Provided Provided patient/caregiver with written discharge plan information;Appointments scheduled and added to AVS (P)         Post-Acute Status    Discharge Delays None known at this time (P)                      Important Message from Medicare                 No future appointments.    SW scheduled post-discharge follow-up appointment and information added to AVS.

## 2025-04-18 NOTE — EICU
[facility] Night Rounds Checklist  24H Vital Sign Range:  Temp:  [97.4 °F (36.3 °C)-98.8 °F (37.1 °C)]   Pulse:  [49-96]   Resp:  [9-38]   BP: (106-164)/(54-91)   SpO2:  [92 %-98 %]     Video rounds and LDA reconciliation

## 2025-04-18 NOTE — DISCHARGE SUMMARY
[facility] - Cardiac Intensive Care  Cardiology  Discharge Summary      Patient Name: Tonya Marquez  MRN: [id number]  Admission Date: 4/16/2025  Hospital Length of Stay: 2 days  Discharge Date and Time: 04/18/2025 11:15 AM  Attending Physician: Daniel Francois MD    Discharging Provider: Padmini Rader DO  Primary Care Physician: Gretchen Pena MD    HPI:   89 y/o W PMH of CAD s/p PCI (9 yrs ago), Ischemic cardiomyopathy, Left bundle branch block presented to ED with left shoulder pain. She reports no  chest pain, palpitations or lightheadedness but reports dizziness for past 6 weeks. She had an episode of fall 6 weeks ago on stairs due to dizziness. She also claims shortness of breath with exertion. He BP at home around 130/80s. She follows with Dr. Estrella at [facility] for cardiology and last seen in February for dizziness and BP in 180s. Chart review shows last Echo in 2019 wiith EF 69%, Lexiscan in 2021 shows mix apical defect with ST depression > 2 mm. In ED EKG  showed complete heart block and Cardiology has been consulted to evaluate complete heart block. In ED TVP was placed and getting admitted to CCU for further management.    Procedure(s) (LRB):  INSERTION, CARDIAC PACEMAKER, LEADLESS (N/A)     Indwelling Lines/Drains at time of discharge:  Lines/Drains/Airways       Central Venous Catheter Line  Duration                  Percutaneous Central Line - Cordis 04/16/25 2000 Internal Jugular Right 1 day                    Hospital Course:  Admitted for CHB. TVP was placed on arrival. EP consulted for pacemaker placement - Micra was placed 4/17 with no immediate complications, pt tolerated procedure well.     She was medically cleared for discharge with close follow up with EP.     Physical Exam  Vitals and nursing note reviewed.   Constitutional:       General: She is not in acute distress.     Appearance: Normal appearance. She is not ill-appearing.   HENT:      Head: Normocephalic and atraumatic.    Eyes:      General: No scleral icterus.     Conjunctiva/sclera: Conjunctivae normal.   Cardiovascular:      Rate and Rhythm: Normal rate and regular rhythm.      Heart sounds: Normal heart sounds.   Pulmonary:      Effort: Pulmonary effort is normal. No respiratory distress.      Breath sounds: Normal breath sounds. No wheezing.   Musculoskeletal:      Right lower leg: No edema.      Left lower leg: No edema.   Neurological:      General: No focal deficit present.      Mental Status: She is alert and oriented to person, place, and time.         Goals of Care Treatment Preferences:  Code Status: Full Code      Consults:   Consults (From admission, onward)          Status Ordering Provider     Inpatient consult to Electrophysiology  Once        Provider:  (Not yet assigned)    Completed RADHA CHANDLER            Significant Diagnostic Studies: N/A    Pending Diagnostic Studies:       Procedure Component Value Units Date/Time    HCV Virus Hold Specimen [[id number]]     Order Status: Sent Lab Status: No result     Specimen: Blood     Urinalysis, Reflex to Urine Culture [[id number]]     Order Status: Sent Lab Status: No result     Specimen: Urine             Final Active Diagnoses:    Diagnosis Date Noted POA    PRINCIPAL PROBLEM:  CHB (complete heart block) [I44.2] 04/17/2025 Yes    CAD (coronary artery disease) [I25.10] 04/17/2025 Yes    Primary hypertension [I10] 04/17/2025 Yes    Localized pain of left shoulder joint [M25.512] 04/17/2025 Yes    Type 2 diabetes mellitus with diabetic polyneuropathy, without long-term current use of insulin [E11.42] 06/02/2017 Yes      Problems Resolved During this Admission:     No new Assessment & Plan notes have been filed under this hospital service since the last note was generated.  Service: Cardiology      Discharged Condition: stable    Disposition: Home or Self Care    Follow Up:    Patient Instructions:   No discharge procedures on file.  Medications:  Reconciled Home  Medications:      Medication List        CONTINUE taking these medications      aspirin 81 MG EC tablet  Commonly known as: ECOTRIN  Take 81 mg by mouth once daily.     atorvastatin 40 MG tablet  Commonly known as: LIPITOR  Take 1 tablet (40 mg total) by mouth once daily.     enalapril 20 MG tablet  Commonly known as: VASOTEC  Take 1 tablet by mouth 2 (two) times daily.     FREESTYLE COLT 14 DAY READER Misc  Generic drug: flash glucose scanning reader  1 Box by Misc.(Non-Drug; Combo Route) route Daily.     FREESTYLE COLT 14 DAY SENSOR Kit  Generic drug: flash glucose sensor  1 kit by Misc.(Non-Drug; Combo Route) route Daily.     glimepiride 1 MG tablet  Commonly known as: AMARYL  Take 1 tablet (1 mg total) by mouth before breakfast.     isosorbide mononitrate 120 MG 24 hr tablet  Commonly known as: IMDUR  Take 120 mg by mouth once daily.     metoprolol succinate 50 MG 24 hr tablet  Commonly known as: TOPROL-XL  Take 1 tablet by mouth once daily.     nitroGLYCERIN 0.3 MG SL tablet  Commonly known as: NITROSTAT  Place 1 tablet (0.3 mg total) under the tongue every 5 (five) minutes as needed for Chest pain.            STOP taking these medications      clopidogreL 75 mg tablet  Commonly known as: PLAVIX              Time spent on the discharge of patient: 30 minutes    Padmini Rader DO  Cardiology  [facility] - Cardiac Intensive Care

## 2025-04-18 NOTE — NURSING
Discussed with Dr. Mckinnon the order for the sling. Per MD PPM is leadless and patient does not need to wear a sling. Order DC.

## 2025-04-18 NOTE — ANESTHESIA POSTPROCEDURE EVALUATION
Anesthesia Post Evaluation    Patient: Tonya Marquez    Procedure(s) Performed: Procedure(s) (LRB):  INSERTION, CARDIAC PACEMAKER, LEADLESS (N/A)    Final Anesthesia Type: general      Patient participation: Yes- Able to Participate  Level of consciousness: awake and alert  Post-procedure vital signs: reviewed and stable  Pain control: Pain has been treated.  Airway patency: patent    PONV status: Absent or treated.  Anesthetic complications: no      Cardiovascular status: hemodynamically stable  Respiratory status: unassisted  Hydration status: euvolemic  Follow-up not needed.              Vitals Value Taken Time   /77 04/18/25 07:10   Temp 36.7 °C (98.1 °F) 04/18/25 04:00   Pulse 74 04/18/25 07:14   Resp 14 04/18/25 07:14   SpO2 98 % 04/18/25 07:14   Vitals shown include unfiled device data.      No case tracking events are documented in the log.      Pain/Tiffany Score: Pain Rating Prior to Med Admin: 4 (4/17/2025  9:28 PM)  Pain Rating Post Med Admin: 0 (4/17/2025 10:28 PM)

## 2025-04-18 NOTE — PLAN OF CARE
S/p micra pacemaker on 4/17/2025. Patient appropriately pacing. No post procedure complications noted. From EP standpoint patient ok to be discharged. Thank you for the consult. EP will sign off. Please call with any quesitions.

## 2025-04-21 ENCOUNTER — TELEPHONE (OUTPATIENT)
Dept: CARDIOLOGY | Facility: HOSPITAL | Age: OVER 89
End: 2025-04-21
Payer: MEDICARE

## 2025-04-21 DIAGNOSIS — I44.2 CHB (COMPLETE HEART BLOCK): Primary | ICD-10-CM

## 2025-04-21 NOTE — TELEPHONE ENCOUNTER
I spoke over the phone with Mr. Butt. One week post implant device check scheduled.  He does not know if she received her home monitor.  He is requesting to be educated  on the home monitor as he will be assisting her.  I let him know the device specialist will review the home monitoring process with him and patient during the appt.           Attempted to reach out to patient to coordinate her one week device check post implant.  Patient is not connected to home monitor.  Patient was having difficulty understanding reason for call as she is hard of hearing. Also, asked if she received a home monitor and if she had connected the monitor.  She was unsure if she did or not.  She requested I speak to her friend, Ralph Butt to coordinate.  She took my name and number and will have him reach out to me.

## 2025-04-22 ENCOUNTER — PATIENT OUTREACH (OUTPATIENT)
Dept: ADMINISTRATIVE | Facility: CLINIC | Age: OVER 89
End: 2025-04-22
Payer: MEDICARE

## 2025-04-22 NOTE — PROGRESS NOTES
C3 nurse spoke with Tonya Marquez  for a TCC post hospital discharge follow up call. The patient does not have a scheduled HOSFU appointment with Gretchen Pena MD  within 5-7 days post hospital discharge date 4/18/2025. C3 nurse was unable to schedule HOSFU appointment in [facility].    Message sent to PCP staff requesting they contact patient and schedule follow up appointment.

## 2025-04-23 DIAGNOSIS — I44.2 CHB (COMPLETE HEART BLOCK): Primary | ICD-10-CM

## 2025-04-24 ENCOUNTER — CLINICAL SUPPORT (OUTPATIENT)
Dept: CARDIOLOGY | Facility: HOSPITAL | Age: OVER 89
End: 2025-04-24
Attending: INTERNAL MEDICINE
Payer: MEDICARE

## 2025-04-24 DIAGNOSIS — I44.2 CHB (COMPLETE HEART BLOCK): ICD-10-CM

## 2025-04-24 PROCEDURE — 93279 PRGRMG DEV EVAL PM/LDLS PM: CPT

## 2025-05-15 ENCOUNTER — OFFICE VISIT (OUTPATIENT)
Dept: FAMILY MEDICINE | Facility: CLINIC | Age: OVER 89
End: 2025-05-15
Payer: MEDICARE

## 2025-05-15 VITALS
HEART RATE: 55 BPM | DIASTOLIC BLOOD PRESSURE: 50 MMHG | HEIGHT: 65 IN | BODY MASS INDEX: 20.57 KG/M2 | TEMPERATURE: 98 F | SYSTOLIC BLOOD PRESSURE: 142 MMHG | OXYGEN SATURATION: 97 % | WEIGHT: 123.44 LBS

## 2025-05-15 DIAGNOSIS — N18.32 TYPE 2 DIABETES MELLITUS WITH STAGE 3B CHRONIC KIDNEY DISEASE, WITHOUT LONG-TERM CURRENT USE OF INSULIN: ICD-10-CM

## 2025-05-15 DIAGNOSIS — Z95.0 PACEMAKER: ICD-10-CM

## 2025-05-15 DIAGNOSIS — E11.22 TYPE 2 DIABETES MELLITUS WITH STAGE 3B CHRONIC KIDNEY DISEASE, WITHOUT LONG-TERM CURRENT USE OF INSULIN: ICD-10-CM

## 2025-05-15 DIAGNOSIS — R42 DIZZINESS AND GIDDINESS: Primary | ICD-10-CM

## 2025-05-15 PROCEDURE — 3288F FALL RISK ASSESSMENT DOCD: CPT | Mod: CPTII,HCNC,S$GLB, | Performed by: NURSE PRACTITIONER

## 2025-05-15 PROCEDURE — 1126F AMNT PAIN NOTED NONE PRSNT: CPT | Mod: CPTII,HCNC,S$GLB, | Performed by: NURSE PRACTITIONER

## 2025-05-15 PROCEDURE — 99999 PR PBB SHADOW E&M-EST. PATIENT-LVL IV: CPT | Mod: PBBFAC,HCNC,, | Performed by: NURSE PRACTITIONER

## 2025-05-15 PROCEDURE — 1111F DSCHRG MED/CURRENT MED MERGE: CPT | Mod: CPTII,HCNC,S$GLB, | Performed by: NURSE PRACTITIONER

## 2025-05-15 PROCEDURE — 99213 OFFICE O/P EST LOW 20 MIN: CPT | Mod: HCNC,S$GLB,, | Performed by: NURSE PRACTITIONER

## 2025-05-15 PROCEDURE — 1100F PTFALLS ASSESS-DOCD GE2>/YR: CPT | Mod: CPTII,HCNC,S$GLB, | Performed by: NURSE PRACTITIONER

## 2025-05-15 RX ORDER — BLOOD-GLUCOSE SENSOR
EACH MISCELLANEOUS
COMMUNITY
Start: 2025-02-05 | End: 2025-05-15

## 2025-05-15 RX ORDER — SENNOSIDES 8.6 MG/1
1 TABLET ORAL
COMMUNITY
Start: 2024-10-23

## 2025-05-15 RX ORDER — DEXTROMETHORPHAN HYDROBROMIDE, GUAIFENESIN 5; 100 MG/5ML; MG/5ML
LIQUID ORAL
COMMUNITY
Start: 2024-10-31

## 2025-05-15 RX ORDER — BLOOD-GLUCOSE SENSOR
1 EACH MISCELLANEOUS CONTINUOUS
Qty: 1 EACH | Refills: 2 | Status: SHIPPED | OUTPATIENT
Start: 2025-05-15 | End: 2025-05-16

## 2025-05-15 RX ORDER — BLOOD-GLUCOSE,RECEIVER,CONT
EACH MISCELLANEOUS
COMMUNITY
Start: 2025-02-05

## 2025-05-15 RX ORDER — ASCORBIC ACID 250 MG
500 TABLET ORAL DAILY
COMMUNITY
Start: 2024-10-23

## 2025-05-15 NOTE — PROGRESS NOTES
"Subjective:       Patient ID: Tonya Marquez is a 89 y.o. female.    Chief Complaint: Dizziness    HPI     Tonya Marquez is a 89 y.o. female patient that presents to clinic with a chief complaint of dizziness. Past medical and surgical history reviewed as listed. PCP is Gretchen Pena MD , she is new to me. Reports history of fall several months ago. Since that time she continues to have dizziness.     States she needs refill for Randal 3 sensor because she is unable to stick her fingers for glucose stick. Sensors are .     MRI of brain     Impression:     Mild cerebral volume loss with scattered foci of T2 FLAIR signal abnormality supratentorial white matter and titi while nonspecific concerning for sequela of chronic ischemic change with punctate remote right cerebellar infarction     No evidence for acute infarction or hydrocephalus.     Past Medical History:   Diagnosis Date    Coronary artery disease     Diabetes mellitus type I     Hyperlipidemia     Hypertension       Past Surgical History:   Procedure Laterality Date    ADENOIDECTOMY      CATARACT EXTRACTION, BILATERAL      CORONARY STENT PLACEMENT  2016    dr. ruiz    IMPLANTATION OF LEADLESS PACEMAKER N/A 2025    Procedure: INSERTION, CARDIAC PACEMAKER, LEADLESS;  Surgeon: Eamon Elizabeth MD;  Location: [facility] EP LAB;  Service: Cardiology;  Laterality: N/A;  CHB, DUAL PPM (LBBPA), MDT, ANES, SK, [id number]      No family history on file.   Review of patient's allergies indicates:   Allergen Reactions    Amoxicillin     Bactrim [sulfamethoxazole-trimethoprim]      Review of Systems    Objective:      Vitals:    05/15/25 1102   BP: (!) 142/50   Pulse: (!) 55   Temp: 97.9 °F (36.6 °C)   TempSrc: Oral   SpO2: 97%   Weight: 56 kg (123 lb 7.3 oz)   Height: 5' 5" (1.651 m)      Physical Exam  Vitals and nursing note reviewed.   Constitutional:       General: She is not in acute distress.  HENT:      Head: Normocephalic.   Eyes:      " Conjunctiva/sclera: Conjunctivae normal.      Pupils: Pupils are equal, round, and reactive to light.   Pulmonary:      Effort: Pulmonary effort is normal. No respiratory distress.   Musculoskeletal:      Cervical back: Normal range of motion.   Skin:     General: Skin is warm and dry.   Neurological:      Mental Status: She is alert and oriented to person, place, and time.      Gait: Gait normal.   Psychiatric:         Mood and Affect: Mood normal.         Behavior: Behavior normal.         Lab Results   Component Value Date    WBC 7.74 04/18/2025    HGB 10.3 (L) 04/18/2025    HCT 31.3 (L) 04/18/2025     04/18/2025    CHOL 94 (L) 02/15/2023    TRIG 83 02/15/2023    HDL 44 02/15/2023    ALT 12 04/18/2025    AST 14 04/18/2025     (L) 04/18/2025    K 4.1 04/18/2025     04/18/2025    CREATININE 1.5 (H) 04/18/2025    BUN 33 (H) 04/18/2025    CO2 23 04/18/2025    TSH 0.585 02/15/2023    INR 1.0 10/16/2024    HGBA1C 6.6 (H) 04/16/2025      Assessment:       1. Dizziness and giddiness    2. Type 2 diabetes mellitus with stage 3b chronic kidney disease, without long-term current use of insulin    3. Pacemaker        Plan:       Dizziness and giddiness  Likely secondary to fall. MRI stable, shows chronic ischemic changes.  Implement fall precautions.   Monitor glucose regularly.   Pt scheduled for diabetes educator to learn how to use Randal sensor.  Monitor BP at home.     Type 2 diabetes mellitus with stage 3b chronic kidney disease, without long-term current use of insulin  Continue current medication plan.   Check A1c as scheduled.   Follow up with Endocrinology.   -     Ambulatory referral/consult to Diabetes Education; Future; Expected date: 05/15/2025  -     flash glucose sensor Kit; 1 kit by Misc.(Non-Drug; Combo Route) route continuous.  Dispense: 1 kit; Refill: 1    Pacemaker  Stable.     Medication List with Changes/Refills   New Medications    BLOOD SUGAR DIAGNOSTIC STRP    Check blood glucose 2  times a day    FLASH GLUCOSE SENSOR KIT    1 kit by Misc.(Non-Drug; Combo Route) route continuous.   Current Medications    ACETAMINOPHEN (TYLENOL ARTHRITIS PAIN) 650 MG TBSR    Take by mouth.    ASCORBIC ACID, VITAMIN C, (VITAMIN C) 250 MG TABLET    Take 500 mg by mouth once daily.    ASPIRIN (ECOTRIN) 81 MG EC TABLET    Take 81 mg by mouth once daily.    ATORVASTATIN (LIPITOR) 40 MG TABLET    Take 1 tablet (40 mg total) by mouth once daily.    ENALAPRIL (VASOTEC) 20 MG TABLET    Take 1 tablet by mouth 2 (two) times daily.    FREESTYLE COLT 3 READER [facility]        GLIMEPIRIDE (AMARYL) 1 MG TABLET    Take 1 tablet (1 mg total) by mouth before breakfast.    ISOSORBIDE MONONITRATE (IMDUR) 120 MG 24 HR TABLET    Take 120 mg by mouth once daily.    METOPROLOL SUCCINATE (TOPROL-XL) 50 MG 24 HR TABLET    Take 1 tablet by mouth once daily.    NITROGLYCERIN (NITROSTAT) 0.3 MG SL TABLET    Place 1 tablet (0.3 mg total) under the tongue every 5 (five) minutes as needed for Chest pain.    SENNA 8.6 MG TABLET    Take 1 tablet by mouth as needed for Constipation.   Changed and/or Refilled Medications    Modified Medication Previous Medication    BLOOD-GLUCOSE SENSOR (FREESTYLE COLT 3 PLUS SENSOR) DANAE blood-glucose sensor (FREESTYLE COLT 3 PLUS SENSOR) Danae       Change every 15 days    Change every 15 days   Discontinued Medications    BLOOD-GLUCOSE SENSOR (FREESTYLE COLT 3 SENSOR) DANAE    1 each by Misc.(Non-Drug; Combo Route) route continuous.    FLASH GLUCOSE SCANNING READER (FREESTYLE COLT 14 DAY READER) MISC    1 Box by Misc.(Non-Drug; Combo Route) route Daily.    FLASH GLUCOSE SENSOR (FREESTYLE COLT 14 DAY SENSOR) KIT    1 kit by Misc.(Non-Drug; Combo Route) route Daily.    FREESTYLE COLT 3 SENSOR DANAE        LINAGLIPTIN (TRADJENTA) 5 MG TAB TABLET    Take 5 mg by mouth once daily.                Mandie Toney, DNP, APRN, FNP-C  [facility]  Vitaliyshe Gordon  05/20/2025 11:23 AM

## 2025-05-16 ENCOUNTER — OFFICE VISIT (OUTPATIENT)
Dept: ENDOCRINOLOGY | Facility: CLINIC | Age: OVER 89
End: 2025-05-16
Payer: MEDICARE

## 2025-05-16 VITALS
WEIGHT: 132.81 LBS | BODY MASS INDEX: 22.1 KG/M2 | HEART RATE: 59 BPM | TEMPERATURE: 97 F | SYSTOLIC BLOOD PRESSURE: 146 MMHG | DIASTOLIC BLOOD PRESSURE: 57 MMHG

## 2025-05-16 DIAGNOSIS — N18.32 TYPE 2 DIABETES MELLITUS WITH STAGE 3B CHRONIC KIDNEY DISEASE, WITHOUT LONG-TERM CURRENT USE OF INSULIN: Primary | ICD-10-CM

## 2025-05-16 DIAGNOSIS — E11.22 TYPE 2 DIABETES MELLITUS WITH STAGE 3B CHRONIC KIDNEY DISEASE, WITHOUT LONG-TERM CURRENT USE OF INSULIN: Primary | ICD-10-CM

## 2025-05-16 PROCEDURE — 99999 PR PBB SHADOW E&M-EST. PATIENT-LVL III: CPT | Mod: PBBFAC,HCNC,, | Performed by: NURSE PRACTITIONER

## 2025-05-16 RX ORDER — BLOOD-GLUCOSE SENSOR
EACH MISCELLANEOUS
Qty: 6 EACH | Refills: 3 | Status: SHIPPED | OUTPATIENT
Start: 2025-05-16 | End: 2025-05-16

## 2025-05-16 RX ORDER — BLOOD-GLUCOSE SENSOR
EACH MISCELLANEOUS
Qty: 6 EACH | Refills: 3 | Status: SHIPPED | OUTPATIENT
Start: 2025-05-16

## 2025-05-16 NOTE — PROGRESS NOTES
CC: This 89 y.o. White female  is here for evaluation of  T2DM along with comorbidities indicated in the Visit Diagnosis section of this encounter.    HPI: Tonya Marquez was diagnosed with T2DM in her 70s.   Dr. Byers - cardiologist     DM COMPLICATIONS: nephropathy and cardiovascular disease  Medical hx: MI, CKD stage 3         Prior visit 11/2024 arrives with neighbor Ralph.   Pt was transferred back to [facility] after lov as her A1c was 6.9%. She is back for an visit today because she desires personal CGM. She had a fall in October and was hospitalized for closed fracture of maxillary sinus. She spent a month at [facility]. Now has difficulty performing fingersticks because of tremors.   Plan Advised pt that personal CGM is not covered by her insurance as she's not on insulin. She cannot afford it out-of-pocket.   Ok to test glucose 1x/weekly with fingerstick since DM is controlled and Tradjenta has low hypoglycemia risk. She is agreeable to this plan will f/u with [facility] for chronic DM management.         Interval hx arrives with neighbor Ralph.  Patient last seen several months ago.  She arrives today because she is confused about her glucose testing supplies.  She brings a box with several glucose monitors (True Metrix and Contour) and wonders what she can keep and discard.  She has been using Randal 3 sensors and last used it 2 days ago.      Pt did not bring Randal reader with her today.  Unfortunately she can not recall what her glucoses are.  Patient prescribed to take glimepiride in January by PCP. Tradjenta was too costly.       LAST DIABETES EDUCATION: 6/2020    SIGNIFICANT DIABETES MED HISTORY:   Glipizide and CKD stopped d/t CKD in 2022   pt does not qualify Tradjenta for financial assistance       PRESCRIBED DIABETES MEDICATIONS:   Glimepiride 1 mg once daily     Misses medication doses - No       SELF MONITORING BLOOD GLUCOSE:      HYPOGLYCEMIC EPISODES: rare      CURRENT DIET: eats 3 meals/day.   Drinks water, tea with AS        CURRENT EXERCISE: goes to class at [employer] 2x/week       BP (!) 146/57 (BP Location: Left arm, Patient Position: Sitting)   Pulse (!) 59   Temp 97.1 °F (36.2 °C)   Wt 60.2 kg (132 lb 12.8 oz)   BMI 22.10 kg/m²     ROS:   CONSTITUTIONAL: Appetite good, denies fatigue  OTHER: denies urinary frequency and polydipsia       PHYSICAL EXAM:  GENERAL: Well developed, well nourished. No acute distress.   PSYCH: AAOx3, appropriate mood and affect, conversant, well-groomed. Judgement and insight good.   NEURO: Cranial nerves grossly intact. Speech clear, no tremor.   CHEST: Respirations even and unlabored.        Hemoglobin A1C   Date Value Ref Range Status   10/21/2024 6.9 (H) 4.7 - 5.6 % Final   07/10/2024 7.4 (H) 4.0 - 5.6 % Final     Comment:     ADA Screening Guidelines:  5.7-6.4%  Consistent with prediabetes  >or=6.5%  Consistent with diabetes    High levels of fetal hemoglobin interfere with the HbA1C  assay. Heterozygous hemoglobin variants (HbS, HgC, etc)do  not significantly interfere with this assay.   However, presence of multiple variants may affect accuracy.     01/09/2024 7.3 (H) 4.0 - 5.6 % Final     Comment:     ADA Screening Guidelines:  5.7-6.4%  Consistent with prediabetes  >or=6.5%  Consistent with diabetes    High levels of fetal hemoglobin interfere with the HbA1C  assay. Heterozygous hemoglobin variants (HbS, HgC, etc)do  not significantly interfere with this assay.   However, presence of multiple variants may affect accuracy.     08/04/2023 7.2 (H) <5.7 % of total Hgb Final     Comment:     For someone without known diabetes, a hemoglobin A1c  value of 6.5% or greater indicates that they may have   diabetes and this should be confirmed with a follow-up   test.     For someone with known diabetes, a value <7% indicates   that their diabetes is well controlled and a value   greater than or equal to 7% indicates suboptimal   control. A1c targets should be  "individualized based on   duration of diabetes, age, comorbid conditions, and   other considerations.     Currently, no consensus exists regarding use of  hemoglobin A1c for diagnosis of diabetes for children.           Hemoglobin A1c   Date Value Ref Range Status   04/16/2025 6.6 (H) 4.0 - 5.6 % Final     Comment:     ADA Screening Guidelines:  5.7-6.4%  Consistent with prediabetes  >=6.5%  Consistent with diabetes    High levels of fetal hemoglobin interfere with the HbA1C  assay. Heterozygous hemoglobin variants (HbS, HgC, etc)do  not significantly interfere with this assay.   However, presence of multiple variants may affect accuracy.       No results found for: "CPEPTIDE", "GLUTAMICACID", "ISLETCELLANT", "FRUCTOSAMINE"     Lab Results   Component Value Date    CHOL 94 (L) 02/15/2023    CHOL 111 (L) 04/07/2021    CHOL 125 06/05/2020     Lab Results   Component Value Date    HDL 44 02/15/2023    HDL 48 04/07/2021    HDL 46 06/05/2020     Lab Results   Component Value Date    LDLCALC 33.4 (L) 02/15/2023    LDLCALC 28.0 (L) 04/07/2021    LDLCALC 47.4 (L) 06/05/2020     Lab Results   Component Value Date    TRIG 83 02/15/2023    TRIG 175 (H) 04/07/2021    TRIG 158 (H) 06/05/2020     Lab Results   Component Value Date    CHOLHDL 46.8 02/15/2023    CHOLHDL 43.2 04/07/2021    CHOLHDL 36.8 06/05/2020         Component Value Date/Time     (L) 04/18/2025 0356     (L) 10/22/2024 0437     08/04/2023 1410    K 4.1 04/18/2025 0356    K 4.1 10/22/2024 0437    K 5.1 08/04/2023 1410     04/18/2025 0356     08/04/2023 1410    CO2 23 04/18/2025 0356    CO2 24 10/22/2024 0437    CO2 23 08/04/2023 1410    BUN 33 (H) 04/18/2025 0356    CREATININE 1.5 (H) 04/18/2025 0356     (H) 04/18/2025 0356     08/04/2023 1410    CALCIUM 9.0 04/18/2025 0356    CALCIUM 8.9 10/22/2024 0437    CALCIUM 10.0 08/04/2023 1410    ALKPHOS 71 04/18/2025 0356    ALKPHOS 88 02/15/2023 0952    AST 14 04/18/2025 0356 "    AST 15 10/18/2024 0442    AST 15 2023 1410    ALT 12 2025 0356    ALT 10 10/18/2024 0442    ALT 16 2023 1410    BILITOT 0.4 2025 0356    BILITOT 0.9 10/18/2024 0442    BILITOT 0.6 2023 1410    EGFRNORACEVR 32 (L) 2025 0356    EGFRNORACEVR 36 (L) 2023 1410    ESTGFRAFRICA 45 (A) 2022 1330              Lab Results   Component Value Date    LABMICR 23.0 02/10/2023    CREATRANDUR 22.0 02/10/2023    MICALBCREAT 104.5 (H) 02/10/2023             ASSESSMENT and PLAN:    A1C GOAL: < 8 %     1. Type 2 diabetes mellitus with stage 3b chronic kidney disease, without long-term current use of insulin  Her  test strips were discarded today.  Sent refill for testing strips to [facility].  Advised her to bring her meter to [facility] so that she gets the correct strips.  Unable to evaluate glucose control as patient can not recall her glucose values and does not bring reader for download.  Patient will bring Randal reader to clinic in a couple of weeks for download.              No orders of the defined types were placed in this encounter.       No follow-ups on file.

## 2025-05-18 ENCOUNTER — CLINICAL SUPPORT (OUTPATIENT)
Dept: CARDIOLOGY | Facility: HOSPITAL | Age: OVER 89
End: 2025-05-18
Payer: MEDICARE

## 2025-05-18 ENCOUNTER — CLINICAL SUPPORT (OUTPATIENT)
Dept: CARDIOLOGY | Facility: HOSPITAL | Age: OVER 89
End: 2025-05-18
Attending: INTERNAL MEDICINE
Payer: MEDICARE

## 2025-05-18 DIAGNOSIS — Z95.0 PRESENCE OF CARDIAC PACEMAKER: ICD-10-CM

## 2025-05-18 DIAGNOSIS — I44.2 ATRIOVENTRICULAR BLOCK, COMPLETE: ICD-10-CM

## 2025-05-18 PROCEDURE — 93294 REM INTERROG EVL PM/LDLS PM: CPT | Mod: HCNC,,, | Performed by: INTERNAL MEDICINE

## 2025-05-18 PROCEDURE — 93296 REM INTERROG EVL PM/IDS: CPT | Mod: HCNC | Performed by: INTERNAL MEDICINE

## 2025-05-21 ENCOUNTER — OFFICE VISIT (OUTPATIENT)
Dept: URGENT CARE | Facility: CLINIC | Age: OVER 89
End: 2025-05-21
Payer: MEDICARE

## 2025-05-21 VITALS
DIASTOLIC BLOOD PRESSURE: 61 MMHG | RESPIRATION RATE: 20 BRPM | TEMPERATURE: 98 F | WEIGHT: 132 LBS | HEIGHT: 65 IN | HEART RATE: 62 BPM | OXYGEN SATURATION: 98 % | SYSTOLIC BLOOD PRESSURE: 166 MMHG | BODY MASS INDEX: 21.99 KG/M2

## 2025-05-21 DIAGNOSIS — N18.32 TYPE 2 DIABETES MELLITUS WITH STAGE 3B CHRONIC KIDNEY DISEASE, WITHOUT LONG-TERM CURRENT USE OF INSULIN: Primary | ICD-10-CM

## 2025-05-21 DIAGNOSIS — E11.22 TYPE 2 DIABETES MELLITUS WITH STAGE 3B CHRONIC KIDNEY DISEASE, WITHOUT LONG-TERM CURRENT USE OF INSULIN: Primary | ICD-10-CM

## 2025-05-21 LAB — GLUCOSE SERPL-MCNC: 133 MG/DL (ref 70–110)

## 2025-05-21 PROCEDURE — 82962 GLUCOSE BLOOD TEST: CPT | Mod: S$GLB,,,

## 2025-05-21 PROCEDURE — 99213 OFFICE O/P EST LOW 20 MIN: CPT | Mod: S$GLB,,,

## 2025-05-21 NOTE — PROGRESS NOTES
"Subjective:      Patient ID: Tonya Marquez is a 89 y.o. female.    Vitals:  height is 5' 5" (1.651 m) and weight is 59.9 kg (132 lb). Her oral temperature is 98.2 °F (36.8 °C). Her blood pressure is 166/61 (abnormal) and her pulse is 62. Her respiration is 20 and oxygen saturation is 98%.     Chief Complaint: Blood Sugar Problem    89-year-old female with history of type 2 diabetes mellitus here for glucose check.  She recently started using a continuous glucose monitoring system.  However, it has been not functioning and is not working properly for the past week. Last time they checked 1 week ago it was in the 170s. She is here today to have her blood sugar checked.  States that she has had chronic dizziness for several months, but denies any acute or new symptoms.    Other  This is a new problem. The current episode started today. The problem occurs constantly. The problem has been unchanged. Pertinent negatives include no abdominal pain, chest pain, chills, fever, nausea, numbness, vomiting or weakness. Nothing aggravates the symptoms. She has tried nothing for the symptoms. The treatment provided no relief.       Constitution: Negative for chills and fever.   Cardiovascular:  Negative for chest pain.   Eyes:  Negative for blurred vision.   Respiratory:  Negative for shortness of breath.    Gastrointestinal:  Negative for abdominal pain, nausea and vomiting.   Neurological:  Positive for dizziness (Chronic). Negative for numbness and tingling.      Objective:     Physical Exam   Constitutional: She is oriented to person, place, and time. She appears well-developed.   HENT:   Head: Normocephalic and atraumatic.   Ears:   Right Ear: External ear normal.   Left Ear: External ear normal.   Nose: Nose normal.   Mouth/Throat: Oropharynx is clear and moist.   Eyes: Conjunctivae, EOM and lids are normal.   Neck: Trachea normal and phonation normal. Neck supple.   Musculoskeletal: Normal range of motion.         " General: Normal range of motion.   Neurological: She is alert and oriented to person, place, and time.   Skin: Skin is warm, dry and intact.   Psychiatric: Her speech is normal and behavior is normal. Judgment and thought content normal.   Nursing note and vitals reviewed.      Results for orders placed or performed in visit on 05/21/25   POCT Glucose, Hand-Held Device    Collection Time: 05/21/25  4:38 PM   Result Value Ref Range    POC Glucose 133 (A) 70 - 110 MG/DL       Assessment:     1. Type 2 diabetes mellitus with stage 3b chronic kidney disease, without long-term current use of insulin      Plan:     Type 2 diabetes mellitus with stage 3b chronic kidney disease, without long-term current use of insulin  -     POCT Glucose, Hand-Held Device    Patient is here just for a blood glucose checked as her CGM machine is not working properly.  Denies any symptoms at this time.          Patient Instructions   Follow up with your endocrinologist and PCP as planned

## 2025-05-28 DIAGNOSIS — E11.51 TYPE 2 DIABETES MELLITUS WITH DIABETIC PERIPHERAL ANGIOPATHY WITHOUT GANGRENE, WITHOUT LONG-TERM CURRENT USE OF INSULIN: ICD-10-CM

## 2025-05-28 NOTE — TELEPHONE ENCOUNTER
Care Due:                  Date            Visit Type   Department     Provider  --------------------------------------------------------------------------------                                [facility]                              PRIMARY      MEDICINE /  Last Visit: 01-      CARE ([facility])   INTERNAL MED   Gretchen Pena  Next Visit: None Scheduled  None         None Found                                                            Last  Test          Frequency    Reason                     Performed    Due Date  --------------------------------------------------------------------------------    Lipid Panel.  12 months..  atorvastatin.............  02-   02-    [facility] Embedded Care Due Messages. Reference number: [id number].   5/28/2025 12:35:27 PM CDT

## 2025-05-29 RX ORDER — GLIMEPIRIDE 1 MG/1
1 TABLET ORAL
Qty: 90 TABLET | Refills: 0 | Status: SHIPPED | OUTPATIENT
Start: 2025-05-29

## 2025-06-10 LAB
OHS CV DC REMOTE DEVICE TYPE: NORMAL
OHS CV RV PACING PERCENT: 90.9 %

## 2025-06-19 ENCOUNTER — TELEPHONE (OUTPATIENT)
Dept: ENDOCRINOLOGY | Facility: CLINIC | Age: OVER 89
End: 2025-06-19
Payer: MEDICARE

## 2025-06-19 NOTE — TELEPHONE ENCOUNTER
Copied from CRM #[id number]. Topic: Appointments - Appointment Scheduling  >> Jun 19, 2025 11:20 AM Med Assistant Selin wrote:  Type: Patient Call Back    Who called: Self    What is the request in detail: pt. States she doesn't have equipment to test her blood sugar.. and states she needs a appt. To discuss her concerns ..    Can the clinic reply by [facility]?NO    Would the patient rather a call back or a response via [facility]?  Yes, call     Best call back number: [phone] (home)

## 2025-06-19 NOTE — TELEPHONE ENCOUNTER
Spoke with patient. Appointment schedule for 07/07/2025 at 10:00 am, to see Bárbara Herndon for diabetes education and Randal training. Patient verbalized understanding.

## 2025-06-20 ENCOUNTER — TELEPHONE (OUTPATIENT)
Dept: ENDOCRINOLOGY | Facility: CLINIC | Age: OVER 89
End: 2025-06-20
Payer: MEDICARE

## 2025-06-20 NOTE — TELEPHONE ENCOUNTER
Spoke with patient. Patient has an appointment schedule with Bárbara Herndon RN for 07/07/2025. Patient verbalized understanding.

## 2025-06-20 NOTE — TELEPHONE ENCOUNTER
Copied from CRM #[id number]. Topic: General Inquiry - Patient Advice  >> Jun 20, 2025 10:04 AM Gibson wrote:  Who called:SELF      What is the request in detail:pt would like for you to give her a call in regards of a machine      Can the clinic reply by [facility]?     Would the patient rather a call back or a response via [facility]?callback      Best call back number:.[phone]       Additional Information:

## 2025-06-23 ENCOUNTER — TELEPHONE (OUTPATIENT)
Dept: ENDOCRINOLOGY | Facility: CLINIC | Age: OVER 89
End: 2025-06-23
Payer: MEDICARE

## 2025-06-23 NOTE — TELEPHONE ENCOUNTER
Spoke with patient. Patient checking on upcoming visit with Bárbara Herndon. Informed patient, she is schedule for 07/07/2025 at 10:30 am. Patient verbalized understanding.

## 2025-06-23 NOTE — TELEPHONE ENCOUNTER
Copied from CRM #[id number]. Topic: General Inquiry - Patient Advice  >> Jun 23, 2025 12:37 PM Tanya wrote:  .Type: Patient Call Back    Who called: Self     What is the request in detail: asked for a call back about her upcoming appt     Can the clinic reply by [facility]? No     Would the patient rather a call back or a response via [facility]? Call     Best call back number: .[phone]      Additional Information:

## 2025-07-07 ENCOUNTER — CLINICAL SUPPORT (OUTPATIENT)
Dept: DIABETES | Facility: CLINIC | Age: OVER 89
End: 2025-07-07
Payer: MEDICARE

## 2025-07-07 VITALS — HEIGHT: 65 IN | WEIGHT: 130 LBS | BODY MASS INDEX: 21.66 KG/M2

## 2025-07-07 DIAGNOSIS — E11.9 TYPE 2 DIABETES MELLITUS WITHOUT COMPLICATION, WITHOUT LONG-TERM CURRENT USE OF INSULIN: Primary | ICD-10-CM

## 2025-07-07 PROCEDURE — G0108 DIAB MANAGE TRN  PER INDIV: HCPCS | Mod: HCNC,S$GLB,, | Performed by: FAMILY MEDICINE

## 2025-07-07 NOTE — PROGRESS NOTES
"Diabetes Care Specialist Progress Note  Author: Bárbara Herndon RN  Date: 7/7/2025    Intake  Program Intake  Reason for Diabetes Program Visit:: Initial Diabetes Assessment  Current diabetes risk level:: low  In the last month, have you used the ER or been admitted to the hospital: No    Current Diabetes Treatment: Oral Medications  Oral Medication Type/Dose: Glimepiride 1mg daily    Continuous Glucose Monitoring  Patient has CGM: Yes  Personal CGM type:: FreeStyle Libre3 (Pt did not bring reader with her today)    Lab Results   Component Value Date    HGBA1C 6.6 (H) 04/16/2025       Weight: 59 kg (130 lb)   Height: 5' 5" (165.1 cm)   Body mass index is 21.63 kg/m².    Lifestyle Coping Support & Clinical  Lifestyle/Coping/Support  Psychosocial/Coping Skills Assessment Completed: : No  Deffered due to:: Time  Area of need?: Deferred    Problem Review  Active Comorbidities: Chronic Kidney Disease, Heart Attack    Diabetes Self-Management Skills Assessment  Medication Skills Assessment  Patient is able to identify current diabetes medications, dosages, and appropriate timing of medications.: yes  Patient reports problems or concerns with current medication regimen.: no  Patient is  aware that some diabetes medications can cause low blood sugar?: No  Medication Skills Assessment Completed:: Yes  Assessment indicates:: Instruction Needed, Knowledge deficit  Area of need?: Yes    Diabetes Disease Process/Treatment Options  Diabetes Type?: Type II  When were you diagnosed?: Dx: in her 70"s  If previous diabetes education, when/where:: no  What are your goals for this education session?: To learn how to eat right with diabetes.  Is patient aware of what causes diabetes?: Yes  Does patient understand the pathophysiology of diabetes?: No  Diabetes Disease Process/Treatment Options: Skills Assessment Completed: Yes  Assessment indicates:: Instruction Needed, Knowledge deficit  Area of need?: Yes    Nutrition/Healthy " Eating  Meal Plan 24 Hour Recall - Breakfast: oatmeal w/toast  Meal Plan 24 Hour Recall - Lunch: salad and watermelon  Meal Plan 24 Hour Recall - Dinner: pork chop w/gravy and rice + vegetables  Meal Plan 24 Hour Recall - Beverage: water and tea  Who shops/cooks?: patient  Patient can identify foods that impact blood sugar.: yes  Challenges to healthy eating:: portion control  Nutrition/Healthy Eating Skills Assessment Completed:: Yes  Assessment indicates:: Instruction Needed, Knowledge deficit  Area of need?: Yes    Physical Activity/Exercise  Physical Activity/Exercise Skills Assessment Completed: : No  Deffered due to:: Time  Area of need?: Deferred    Home Blood Glucose Monitoring  Patient states that blood sugar is checked at home daily.: yes  Monitoring Method:: personal continuous glucose monitor  Personal CGM type:: FreeStyle Libre3 (Pt did not bring reader with her today)   What is your current Time in Range?: Patient did not bring her reader with her today  What is your A1c Target?: <7.0%  Home Blood Glucose Monitoring Skills Assessment Completed: : Yes  Assessment indicates:: Instruction Needed, Knowledge deficit  Area of need?: Yes    Acute Complications  Acute Complications Skills Assessment Completed: : No  Deffered due to:: Time  Area of need?: Deferred    Chronic Complications  Chronic Complications Skills Assessment Completed: : No  Deferred due to:: Time  Area of need?: Deferred      Assessment Summary and Plan    Based on today's diabetes care assessment, the following areas of need were identified:      Identified Areas of Need      Medication/Current Diabetes Treatment: Yes - Reviewed Patient's current diabetes medication regimen with Glimepiride. MOA reviewed including common side effects   Lifestyle Coping/Support: Deferred   Diabetes Disease Process/Treatment Options: Yes - Provided DM management information and discussed what is diabetes and the  significance of current A1c and blood  glucose goals.   Nutrition/Healthy Eating: Yes - see care planning   Physical Activity/Exercise: Deferred    Home Blood Glucose Monitoring: Yes - Pt to bring in GoHome reader for uploading.    Acute Complications: Deferred    Chronic Complications: Deferred   Today's interventions were provided through individual discussion, instruction, and written materials were provided.      Patient verbalized understanding of instruction and written materials.  Pt was able to return back demonstration of instructions today. Patient understood key points, needs reinforcement and further instruction.     Diabetes Self-Management Care Plan:    Today's Diabetes Self-Management Care Plan was developed with Tonya's input. Tonya has agreed to work toward the following goal(s) to improve his/her overall diabetes control.      Care Plan: Diabetes Management   Updates made since 7/7/2024 12:00 AM        Problem: Healthy Eating         Goal: Eat 2-3 meals daily with 30-45g/2-3 servings of Carbohydrate per meal. Limit snacking in between meal to 1 serving/15 grams or up to 20 grams.    Start Date: 7/7/2025   Priority: Medium   Note:    7/7/25 - - We reviewed eating right with diabetes. We discussed the importance of eating balanced meals with vegetables, fruits, lean meats, and whole grains. We concentrated on portion sizes and overall meal planning with various choices for meals. We discussed carb sources of foods, appropriate amount of carbs to have at meals/snacks and acceptable serving sizes of individual carb items. Obtained a 24-hr food recall from patient. We discussed various meal plan options to promote healthy eating.      - - Started reviewing how to read food labels on various food items with patient focusing on serving size and total carbohydrate intake (not sugar intake). Instructed on appropriate serving sizes of specific carb containing foods. Stressed importance of eating a protein at each meal and include non-starchy  vegetables at lunch/dinner. Instructed pt to aim for evenly spaced meals or a small        Task: Provided visual examples using dry measuring cups, food models, and other familiar objects such as computer mouse, deck or cards, tennis ball etc. to help with visualization of portions. Completed 7/7/2025        Task: Explained how to count carbohydrates using the food label and the use of dry measuring cups for accurate carb counting. Completed 7/7/2025     Task: Recommended replacing beverages containing high sugar content with noncaloric/sugar free options and/or water. Completed 7/7/2025        Task: Review the importance of balancing carbohydrates with each meal using portion control techniques to count servings of carbohydrate and label reading to identify serving size and amount of total carbs per serving. Completed 7/7/2025         Follow Up Plan   Follow up in about 1 day (around 7/8/2025) for F/U upload FreeStyle Libre3 reader.    Today's care plan and follow up schedule was discussed with patient.  Tonya verbalized understanding of the care plan, goals, and agrees to follow up plan.        The patient was encouraged to communicate with his/her health care provider/physician and care team regarding his/her condition(s) and treatment.  I provided the patient with my contact information today and encouraged to contact me via phone or [facility] as needed.     Length of Visit   Total Time: 60 Minutes

## 2025-07-08 ENCOUNTER — CLINICAL SUPPORT (OUTPATIENT)
Dept: DIABETES | Facility: CLINIC | Age: OVER 89
End: 2025-07-08
Payer: MEDICARE

## 2025-07-08 DIAGNOSIS — E11.9 TYPE 2 DIABETES MELLITUS WITHOUT COMPLICATION, WITHOUT LONG-TERM CURRENT USE OF INSULIN: Primary | ICD-10-CM

## 2025-07-08 PROCEDURE — G0108 DIAB MANAGE TRN  PER INDIV: HCPCS | Mod: HCNC,S$GLB,, | Performed by: FAMILY MEDICINE

## 2025-07-08 NOTE — PROGRESS NOTES
Diabetes Care Specialist Progress Note  Author: Bárbara Herndon RN  Date: 7/8/2025        Intake  Program Intake  Reason for Diabetes Program Visit:: Intervention  Type of Intervention:: Individual  Individual: Device Training  Device Training: Personal CGM  Current diabetes risk level:: low  In the last month, have you used the ER or been admitted to the hospital: No    Current Diabetes Treatment: Oral Medications  Oral Medication Type/Dose: Glimepiride 1mg daily    Continuous Glucose Monitoring  Patient has CGM: Yes  Personal CGM type:: FreeStyle Libre3 w/ reader  GMI Date: 07/08/25  GMI Value: 9.1 %    Lab Results   Component Value Date    HGBA1C 6.6 (H) 04/16/2025     Lifestyle Coping Support & Clinical  Lifestyle/Coping/Support  Psychosocial/Coping Skills Assessment Completed: : No  Deffered due to:: Time  Area of need?: Deferred    Diabetes Self-Management Skills Assessment  Medication Skills Assessment  Patient is able to identify current diabetes medications, dosages, and appropriate timing of medications.: yes  Patient reports problems or concerns with current medication regimen.: no  Patient is  aware that some diabetes medications can cause low blood sugar?: No  Medication Skills Assessment Completed:: Yes  Assessment indicates:: Instruction Needed  Area of need?: Deferred    Diabetes Disease Process/Treatment Options  Diabetes Disease Process/Treatment Options: Skills Assessment Completed: No  Deferred due to:: Time  Area of need?: Deferred    Nutrition/Healthy Eating  Nutrition/Healthy Eating Skills Assessment Completed:: No  Deffered due to:: Time  Area of need?: Deferred    Physical Activity/Exercise  Physical Activity/Exercise Skills Assessment Completed: : No  Deffered due to:: Time  Area of need?: Deferred    Home Blood Glucose Monitoring  Patient states that blood sugar is checked at home daily.: yes  Monitoring Method:: personal continuous glucose monitor  Personal CGM type:: FreeStyle Libre3 w/  reader   What is your current Time in Range?: 83%  What is your A1c Target?: <7.0%  Home Blood Glucose Monitoring Skills Assessment Completed: : Yes  Assessment indicates:: Instruction Needed, Knowledge deficit  Area of need?: Yes    Acute Complications  Acute Complications Skills Assessment Completed: : No  Deffered due to:: Time  Area of need?: Deferred    Chronic Complications  Chronic Complications Skills Assessment Completed: : No  Deferred due to:: Time  Area of need?: Deferred      Assessment Summary and Plan    Based on today's diabetes care assessment, the following areas of need were identified:      Identified Areas of Need      Medication/Current Diabetes Treatment: Deferred   Lifestyle Coping/Support: Deferred   Diabetes Disease Process/Treatment Options: Deferred   Nutrition/Healthy Eating: Deferred    Physical Activity/Exercise: Deferred    Home Blood Glucose Monitoring: Yes - Provided Libre3 training to patient and neighbor on how to start using the reader and how to operated the Libre3 w/reader. Pt/neighbor verbalized understanding.     Acute Complications: Deferred    Chronic Complications: Deferred   Today's interventions were provided through individual discussion, instruction, and written materials were provided.      Patient verbalized understanding of instruction and written materials.  Pt was able to return back demonstration of instructions today. Patient understood key points, needs reinforcement and further instruction.     Diabetes Self-Management Care Plan:    Today's Diabetes Self-Management Care Plan was developed with Tonya's input. Tonya has agreed to work toward the following goal(s) to improve his/her overall diabetes control.      Care Plan: Diabetes Management   Updates made since 7/14/2024 12:00 AM        Problem: Healthy Eating         Goal: Eat 2-3 meals daily with 30-45g/2-3 servings of Carbohydrate per meal. Limit snacking in between meal to 1 serving/15 grams or up to 20  grams.    Start Date: 7/7/2025   This Visit's Progress: Deferred   Priority: Medium   Note:    7/7/25 - - (neighbor present who helps patient) We reviewed eating right with diabetes. We discussed the importance of eating balanced meals with vegetables, fruits, lean meats, and whole grains. We concentrated on portion sizes and overall meal planning with various choices for meals. We discussed carb sources of foods, appropriate amount of carbs to have at meals/snacks and acceptable serving sizes of individual carb items. Obtained a 24-hr food recall from patient. We discussed various meal plan options to promote healthy eating.      - - Started reviewing how to read food labels on various food items with patient focusing on serving size and total carbohydrate intake (not sugar intake). Instructed on appropriate serving sizes of specific carb containing foods. Stressed importance of eating a protein at each meal and include non-starchy vegetables at lunch/dinner. Instructed pt to aim for evenly spaced meals or a small carb snack in place of a missed meal. Written education information provided to patient for use at home. Pt verbalized understanding of all the above.    7/8/25 - - Deferred       Task: Provided visual examples using dry measuring cups, food models, and other familiar objects such as computer mouse, deck or cards, tennis ball etc. to help with visualization of portions. Completed 7/7/2025        Task: Explained how to count carbohydrates using the food label and the use of dry measuring cups for accurate carb counting. Completed 7/7/2025        Task: Discussed strategies for choosing healthier menu options when dining out.         Task: Recommended replacing beverages containing high sugar content with noncaloric/sugar free options and/or water. Completed 7/7/2025        Task: Review the importance of balancing carbohydrates with each meal using portion control techniques to count servings of carbohydrate  and label reading to identify serving size and amount of total carbs per serving. Completed 7/7/2025       Follow Up Plan   Follow up for Will schedule 1 mth f/u.    Today's care plan and follow up schedule was discussed with patient.  Tonya verbalized understanding of the care plan, goals, and agrees to follow up plan.        The patient was encouraged to communicate with his/her health care provider/physician and care team regarding his/her condition(s) and treatment.  I provided the patient with my contact information today and encouraged to contact me via phone or [facility] Portal as needed.     Length of Visit   Total Time: 40 Minutes

## 2025-07-11 ENCOUNTER — TELEPHONE (OUTPATIENT)
Dept: FAMILY MEDICINE | Facility: CLINIC | Age: OVER 89
End: 2025-07-11
Payer: MEDICARE

## 2025-07-11 NOTE — TELEPHONE ENCOUNTER
Copied from CRM #[id number]. Topic: General Inquiry - Return Call  >> Jul 11, 2025 11:52 AM Garrison wrote:  Type: Patient Call Back    Who called: Neighbor-Ralph Butt    What is the request in detail: Within in the last week patient has declined. Patient has no energy or motivation. No noise, no lights on wants to be in complete silence and the dark 24/7. Patient has mentally regressed drastically within one weeks time and its concerning. Wants a call asap. Declined symptom screen due to patient being in her own apartment not near neighbor.     Can the clinic reply by [facility]?no    Would the patient rather a call back or a response via [facility]? call    Best call back number: [phone]      Additional Information:

## 2025-07-11 NOTE — TELEPHONE ENCOUNTER
Called Ralph/patient's friend&neighbor to inform of Dr. Pena' response.  Ralph advised that he was with patient at [facility] at time of call.  Please be advised.

## 2025-07-22 ENCOUNTER — OFFICE VISIT (OUTPATIENT)
Dept: FAMILY MEDICINE | Facility: CLINIC | Age: OVER 89
End: 2025-07-22
Payer: MEDICARE

## 2025-07-22 VITALS
TEMPERATURE: 98 F | BODY MASS INDEX: 20.42 KG/M2 | OXYGEN SATURATION: 97 % | HEIGHT: 65 IN | WEIGHT: 122.56 LBS | HEART RATE: 67 BPM | SYSTOLIC BLOOD PRESSURE: 144 MMHG | DIASTOLIC BLOOD PRESSURE: 58 MMHG

## 2025-07-22 DIAGNOSIS — G25.2 INTENTION TREMOR: Primary | ICD-10-CM

## 2025-07-22 DIAGNOSIS — F03.90 DEMENTIA, UNSPECIFIED DEMENTIA SEVERITY, UNSPECIFIED DEMENTIA TYPE, UNSPECIFIED WHETHER BEHAVIORAL, PSYCHOTIC, OR MOOD DISTURBANCE OR ANXIETY: ICD-10-CM

## 2025-07-22 DIAGNOSIS — I20.89 STABLE ANGINA: ICD-10-CM

## 2025-07-22 PROCEDURE — 1101F PT FALLS ASSESS-DOCD LE1/YR: CPT | Mod: CPTII,HCNC,S$GLB, | Performed by: FAMILY MEDICINE

## 2025-07-22 PROCEDURE — 3288F FALL RISK ASSESSMENT DOCD: CPT | Mod: CPTII,HCNC,S$GLB, | Performed by: FAMILY MEDICINE

## 2025-07-22 PROCEDURE — 1126F AMNT PAIN NOTED NONE PRSNT: CPT | Mod: CPTII,HCNC,S$GLB, | Performed by: FAMILY MEDICINE

## 2025-07-22 PROCEDURE — 1159F MED LIST DOCD IN RCRD: CPT | Mod: CPTII,HCNC,S$GLB, | Performed by: FAMILY MEDICINE

## 2025-07-22 PROCEDURE — 99999 PR PBB SHADOW E&M-EST. PATIENT-LVL IV: CPT | Mod: PBBFAC,HCNC,, | Performed by: FAMILY MEDICINE

## 2025-07-22 PROCEDURE — 99214 OFFICE O/P EST MOD 30 MIN: CPT | Mod: HCNC,S$GLB,, | Performed by: FAMILY MEDICINE

## 2025-07-22 RX ORDER — MEMANTINE HYDROCHLORIDE 5 MG/1
5 TABLET ORAL 2 TIMES DAILY
Qty: 60 TABLET | Refills: 11 | Status: SHIPPED | OUTPATIENT
Start: 2025-07-22 | End: 2025-07-22

## 2025-07-22 RX ORDER — MEMANTINE HYDROCHLORIDE 5 MG/1
5 TABLET ORAL 2 TIMES DAILY
Qty: 180 TABLET | Refills: 3 | Status: SHIPPED | OUTPATIENT
Start: 2025-07-22 | End: 2026-07-22

## 2025-07-22 NOTE — PROGRESS NOTES
Subjective     Patient ID: Tonya Marquez is a 89 y.o. female.    Chief Complaint: Annual Exam    89 year old female presents new to me. She is a patient of my partner, Dr. Pena. She is here with her neighbor about concerns about her losing things. She states she had normal memory and now she has issues remembering where things are placed. She is losing things now. She lives alone in an apartment. She has no children and no family here. She is from [place].         EXAMINATION:  MRI BRAIN WITHOUT CONTRAST     CLINICAL HISTORY:  Dizziness, non-specific;  Dizziness and giddiness     TECHNIQUE:  Sagittal and axial T1, axial T2, axial FLAIR, axial gradient and axial diffusion imaging of the whole brain without contrast.     COMPARISON:  None     FINDINGS:  Generalized cerebral volume loss with compensatory enlargement ventricles sulci and cisterns without hydrocephalus.  No restricted diffusion to suggest acute or recent infarction.  Few small to punctate scattered foci of T2 FLAIR signal hyperintensity supratentorial white matter and titi while nonspecific concerning for sequela of chronic ischemic change.  Punctate area encephalomalacia right cerebellum compatible with remote infarction     No abnormal parenchymal susceptibility to suggest parenchymal hemorrhage.  Right distal vertebral artery T2 flow void not seen which may be developmentally hypoplastic.     Remote operative change bilateral lens replacement     Impression:     Mild cerebral volume loss with scattered foci of T2 FLAIR signal abnormality supratentorial white matter and titi while nonspecific concerning for sequela of chronic ischemic change with punctate remote right cerebellar infarction     No evidence for acute infarction or hydrocephalus.           History of Present Illness               Review of Systems   Constitutional:  Negative for chills, fatigue and fever.   Respiratory:  Negative for cough, chest tightness, shortness of breath and  "wheezing.    Cardiovascular:  Negative for chest pain, palpitations and leg swelling.   Gastrointestinal:  Negative for abdominal pain, blood in stool, diarrhea, nausea and vomiting.   Genitourinary:  Negative for dysuria, frequency and hematuria.   Integumentary:  Negative for rash.   Neurological:  Negative for dizziness, syncope and light-headedness.            Objective     Vitals:    07/22/25 1513   BP: (!) 144/58   Pulse: 67   Temp: 97.7 °F (36.5 °C)   TempSrc: Oral   SpO2: 97%   Weight: 55.6 kg (122 lb 9.2 oz)   Height: 5' 5" (1.651 m)        Physical Exam  Constitutional:       General: She is not in acute distress.     Appearance: Normal appearance. She is well-developed. She is not ill-appearing or diaphoretic.   HENT:      Head: Normocephalic and atraumatic.      Right Ear: External ear normal.      Left Ear: External ear normal.      Mouth/Throat:      Pharynx: No oropharyngeal exudate.   Eyes:      Conjunctiva/sclera: Conjunctivae normal.   Neck:      Thyroid: No thyromegaly.   Cardiovascular:      Rate and Rhythm: Normal rate and regular rhythm.      Heart sounds: Normal heart sounds. No murmur heard.     No friction rub. No gallop.   Pulmonary:      Effort: Pulmonary effort is normal. No respiratory distress.      Breath sounds: Normal breath sounds. No stridor. No wheezing, rhonchi or rales.   Abdominal:      General: Bowel sounds are normal. There is no distension.      Palpations: Abdomen is soft. There is no mass.      Tenderness: There is no abdominal tenderness. There is no guarding or rebound.   Musculoskeletal:      Cervical back: Normal range of motion and neck supple.   Lymphadenopathy:      Cervical: No cervical adenopathy.   Skin:     Findings: No rash.   Neurological:      Mental Status: She is alert.      Comments: MMSE : 19/30     No images are attached to the encounter.    Physical Exam                Assessment and Plan     1. Intention tremor  -     Ambulatory referral/consult to " Outpatient Case Management    2. Dementia, unspecified dementia severity, unspecified dementia type, unspecified whether behavioral, psychotic, or mood disturbance or anxiety  -     Ambulatory referral/consult to Outpatient Case Management  -     Discontinue: memantine (NAMENDA) 5 MG Tab; Take 1 tablet (5 mg total) by mouth 2 (two) times daily.  Dispense: 60 tablet; Refill: 11  -     memantine (NAMENDA) 5 MG Tab; Take 1 tablet (5 mg total) by mouth 2 (two) times daily.  Dispense: 180 tablet; Refill: 3  STARTING NAMENDA. She will need a  as she may need placement. She has no local family, her neighbor is helping, but admits that she is reqiring more than he will be able to give.    3. Stable angina  Overview:  Rarely uses NTG        Tonya was seen today for annual exam.    Diagnoses and all orders for this visit:    Intention tremor  -     Ambulatory referral/consult to Outpatient Case Management    Dementia, unspecified dementia severity, unspecified dementia type, unspecified whether behavioral, psychotic, or mood disturbance or anxiety  -     Ambulatory referral/consult to Outpatient Case Management  -     Discontinue: memantine (NAMENDA) 5 MG Tab; Take 1 tablet (5 mg total) by mouth 2 (two) times daily.  -     memantine (NAMENDA) 5 MG Tab; Take 1 tablet (5 mg total) by mouth 2 (two) times daily.    Stable angina        Assessment & Plan                  Please contact neighbor: UMAIR DENNIS [phone]. HE BROUGHT HER HERE TODAY WITH CONCERNS ABOUT HER MEMORY. HE STATES SHE IS LOSING THINGS. SHE HAS NO 'S LICENSE, DEBIT CARDS. HE IS PAYING HER RENT AND ELECTRIC BILL WITH CHECKS. SHE HAS NO FAMILY HERE AND IS FROM [place].    No follow-ups on file.        This note was generated with the assistance of ambient listening technology. Verbal consent was obtained by the patient and accompanying visitor(s) for the recording of patient appointment to facilitate this note. I attest to having reviewed and  edited the generated note for accuracy, though some syntax or spelling errors may persist. Please contact the author of this note for any clarification.

## 2025-07-23 PROBLEM — Z95.0 PACEMAKER: Status: ACTIVE | Noted: 2025-07-23

## 2025-07-25 ENCOUNTER — TELEPHONE (OUTPATIENT)
Dept: FAMILY MEDICINE | Facility: CLINIC | Age: OVER 89
End: 2025-07-25
Payer: MEDICARE

## 2025-07-25 NOTE — TELEPHONE ENCOUNTER
Copied from CRM #[id number]. Topic: General Inquiry - Patient Advice  >> Jul 25, 2025 12:03 PM Mandie wrote:  Type: Patient Call Back    Who called: Pt friend/ neighbor     What is the request in detail: Requesting a call back regarding home health services and  for the pt.Please contact to further discuss and advise.      Marked Urgent per pt   Can the clinic reply by [facility]? N     Would the patient rather a call back or a response via [facility]? Call    Best call back number: ( Ralph number

## 2025-07-25 NOTE — TELEPHONE ENCOUNTER
Called outpatient case management and spoke with Lexi.     Clarified how case management process coordinates with the patient. the  assigned will contact the patient or family to coordinate care. This can take up to 2 weeks.    Called the family for the update. Ralph stated understanding.

## 2025-08-11 ENCOUNTER — OUTPATIENT CASE MANAGEMENT (OUTPATIENT)
Dept: ADMINISTRATIVE | Facility: OTHER | Age: OVER 89
End: 2025-08-11
Payer: MEDICARE

## 2025-08-12 ENCOUNTER — OUTPATIENT CASE MANAGEMENT (OUTPATIENT)
Dept: ADMINISTRATIVE | Facility: OTHER | Age: OVER 89
End: 2025-08-12
Payer: MEDICARE

## 2025-08-17 ENCOUNTER — CLINICAL SUPPORT (OUTPATIENT)
Dept: CARDIOLOGY | Facility: HOSPITAL | Age: OVER 89
End: 2025-08-17
Attending: INTERNAL MEDICINE
Payer: MEDICARE

## 2025-08-17 ENCOUNTER — CLINICAL SUPPORT (OUTPATIENT)
Dept: CARDIOLOGY | Facility: HOSPITAL | Age: OVER 89
End: 2025-08-17
Payer: MEDICARE

## 2025-08-17 DIAGNOSIS — I44.2 ATRIOVENTRICULAR BLOCK, COMPLETE: ICD-10-CM

## 2025-08-17 DIAGNOSIS — Z95.0 PRESENCE OF CARDIAC PACEMAKER: ICD-10-CM

## 2025-08-17 PROCEDURE — 93296 REM INTERROG EVL PM/IDS: CPT | Mod: HCNC | Performed by: INTERNAL MEDICINE

## 2025-08-17 PROCEDURE — 93294 REM INTERROG EVL PM/LDLS PM: CPT | Mod: HCNC,,, | Performed by: INTERNAL MEDICINE

## 2025-08-18 ENCOUNTER — OUTPATIENT CASE MANAGEMENT (OUTPATIENT)
Dept: ADMINISTRATIVE | Facility: OTHER | Age: OVER 89
End: 2025-08-18
Payer: MEDICARE

## 2025-08-19 ENCOUNTER — OUTPATIENT CASE MANAGEMENT (OUTPATIENT)
Dept: ADMINISTRATIVE | Facility: OTHER | Age: OVER 89
End: 2025-08-19
Payer: MEDICARE

## 2025-08-20 ENCOUNTER — OUTPATIENT CASE MANAGEMENT (OUTPATIENT)
Dept: ADMINISTRATIVE | Facility: OTHER | Age: OVER 89
End: 2025-08-20
Payer: MEDICARE

## 2025-08-20 ENCOUNTER — HOSPITAL ENCOUNTER (EMERGENCY)
Facility: HOSPITAL | Age: OVER 89
Discharge: HOME | End: 2025-08-20
Attending: STUDENT IN AN ORGANIZED HEALTH CARE EDUCATION/TRAINING PROGRAM
Payer: MEDICARE

## 2025-08-20 VITALS
TEMPERATURE: 99 F | SYSTOLIC BLOOD PRESSURE: 119 MMHG | BODY MASS INDEX: 20.33 KG/M2 | HEART RATE: 70 BPM | OXYGEN SATURATION: 99 % | WEIGHT: 122 LBS | RESPIRATION RATE: 16 BRPM | HEIGHT: 65 IN | DIASTOLIC BLOOD PRESSURE: 58 MMHG

## 2025-08-20 DIAGNOSIS — L89.90 PRESSURE INJURY: Primary | ICD-10-CM

## 2025-08-20 DIAGNOSIS — L03.317 CELLULITIS OF BUTTOCK: ICD-10-CM

## 2025-08-20 LAB — POCT GLUCOSE: 261 MG/DL (ref 70–110)

## 2025-08-20 PROCEDURE — 82962 GLUCOSE BLOOD TEST: CPT | Mod: HCNC,ER

## 2025-08-20 PROCEDURE — 99283 EMERGENCY DEPT VISIT LOW MDM: CPT | Mod: HCNC,ER

## 2025-08-20 PROCEDURE — 25000003 PHARM REV CODE 250: Mod: HCNC,ER | Performed by: STUDENT IN AN ORGANIZED HEALTH CARE EDUCATION/TRAINING PROGRAM

## 2025-08-20 RX ORDER — LIDOCAINE HYDROCHLORIDE 20 MG/ML
JELLY TOPICAL
Status: COMPLETED | OUTPATIENT
Start: 2025-08-20 | End: 2025-08-20

## 2025-08-20 RX ORDER — CLINDAMYCIN HYDROCHLORIDE 150 MG/1
300 CAPSULE ORAL EVERY 8 HOURS
Qty: 42 CAPSULE | Refills: 0 | Status: SHIPPED | OUTPATIENT
Start: 2025-08-20 | End: 2025-08-27

## 2025-08-20 RX ORDER — ACETAMINOPHEN 325 MG/1
650 TABLET ORAL
Status: COMPLETED | OUTPATIENT
Start: 2025-08-20 | End: 2025-08-20

## 2025-08-20 RX ADMIN — ACETAMINOPHEN 650 MG: 325 TABLET ORAL at 07:08

## 2025-08-20 RX ADMIN — LIDOCAINE HYDROCHLORIDE 10 ML: 20 JELLY TOPICAL at 07:08

## 2025-08-21 ENCOUNTER — TELEPHONE (OUTPATIENT)
Dept: FAMILY MEDICINE | Facility: CLINIC | Age: OVER 89
End: 2025-08-21
Payer: MEDICARE

## 2025-08-25 LAB
OHS CV DC REMOTE DEVICE TYPE: NORMAL
OHS CV RV PACING PERCENT: 96.8 %

## (undated) DEVICE — VISIPAQUE CONTRAST 320MG/100ML

## (undated) DEVICE — LINE PRESSURE MONITORING 96IN

## (undated) DEVICE — DILATOR COONS TAPER 14FR

## (undated) DEVICE — DILATOR VESSEL COONS 18FR 20CM

## (undated) DEVICE — SHEATH HEMOSTASIS 8.5FR

## (undated) DEVICE — KIT PROBE COVER WITH GEL

## (undated) DEVICE — PAD DEFIB CADENCE ADULT R2

## (undated) DEVICE — CUP MEDICINE GRAD STRL 2OZ

## (undated) DEVICE — GUIDE WIRE AMPLATZ .035X1 MDTH

## (undated) DEVICE — DILATOR VES COONS .038X16X20CM

## (undated) DEVICE — PACK EP DRAPE OMC

## (undated) DEVICE — DEVICE PERCLOSE SUT CLSR 6FR